# Patient Record
Sex: MALE | Race: WHITE | Employment: OTHER | URBAN - METROPOLITAN AREA
[De-identification: names, ages, dates, MRNs, and addresses within clinical notes are randomized per-mention and may not be internally consistent; named-entity substitution may affect disease eponyms.]

---

## 2017-09-18 ENCOUNTER — TRANSCRIBE ORDERS (OUTPATIENT)
Dept: ADMINISTRATIVE | Facility: HOSPITAL | Age: 67
End: 2017-09-18

## 2017-09-18 ENCOUNTER — ALLSCRIPTS OFFICE VISIT (OUTPATIENT)
Dept: OTHER | Facility: OTHER | Age: 67
End: 2017-09-18

## 2017-09-18 DIAGNOSIS — R31.0 GROSS HEMATURIA: ICD-10-CM

## 2017-09-18 DIAGNOSIS — R31.0 GROSS HEMATURIA: Primary | ICD-10-CM

## 2017-09-18 LAB
BILIRUB UR QL STRIP: NEGATIVE
CLARITY UR: NORMAL
COLOR UR: YELLOW
GLUCOSE (HISTORICAL): NEGATIVE
HGB UR QL STRIP.AUTO: NORMAL
KETONES UR STRIP-MCNC: NEGATIVE MG/DL
LEUKOCYTE ESTERASE UR QL STRIP: NORMAL
NITRITE UR QL STRIP: NEGATIVE
PH UR STRIP.AUTO: 6 [PH]
PROT UR STRIP-MCNC: NEGATIVE MG/DL
SP GR UR STRIP.AUTO: 1.01
UROBILINOGEN UR QL STRIP.AUTO: 0.2

## 2017-09-27 ENCOUNTER — HOSPITAL ENCOUNTER (OUTPATIENT)
Dept: CT IMAGING | Facility: HOSPITAL | Age: 67
Discharge: HOME | End: 2017-09-27
Attending: UROLOGY
Payer: MEDICARE

## 2017-09-27 DIAGNOSIS — R31.0 GROSS HEMATURIA: ICD-10-CM

## 2017-09-27 PROCEDURE — 74178 CT ABD&PLV WO CNTR FLWD CNTR: CPT

## 2017-09-27 RX ADMIN — IOHEXOL 100 ML: 350 INJECTION, SOLUTION INTRAVENOUS at 07:43

## 2017-10-24 ENCOUNTER — ALLSCRIPTS OFFICE VISIT (OUTPATIENT)
Dept: OTHER | Facility: OTHER | Age: 67
End: 2017-10-24

## 2017-10-24 LAB
BILIRUB UR QL STRIP: NEGATIVE
CLARITY UR: NORMAL
COLOR UR: YELLOW
GLUCOSE (HISTORICAL): NEGATIVE
HGB UR QL STRIP.AUTO: NORMAL
KETONES UR STRIP-MCNC: NEGATIVE MG/DL
LEUKOCYTE ESTERASE UR QL STRIP: NORMAL
NITRITE UR QL STRIP: NEGATIVE
PH UR STRIP.AUTO: 6.5 [PH]
PROT UR STRIP-MCNC: NEGATIVE MG/DL
SP GR UR STRIP.AUTO: 1.02
UROBILINOGEN UR QL STRIP.AUTO: 0.2

## 2017-11-14 ENCOUNTER — ANESTHESIA EVENT (OUTPATIENT)
Dept: PERIOP | Facility: HOSPITAL | Age: 67
End: 2017-11-14
Payer: MEDICARE

## 2017-11-14 RX ORDER — SODIUM CHLORIDE 9 MG/ML
125 INJECTION, SOLUTION INTRAVENOUS CONTINUOUS
Status: CANCELLED | OUTPATIENT
Start: 2017-11-14

## 2017-11-15 ENCOUNTER — HOSPITAL ENCOUNTER (OUTPATIENT)
Dept: NON INVASIVE DIAGNOSTICS | Facility: HOSPITAL | Age: 67
Discharge: HOME | End: 2017-11-15
Attending: UROLOGY
Payer: MEDICARE

## 2017-11-15 ENCOUNTER — APPOINTMENT (OUTPATIENT)
Dept: PREADMISSION TESTING | Facility: HOSPITAL | Age: 67
End: 2017-11-15
Payer: MEDICARE

## 2017-11-15 ENCOUNTER — APPOINTMENT (OUTPATIENT)
Dept: LAB | Facility: HOSPITAL | Age: 67
End: 2017-11-15
Attending: UROLOGY
Payer: MEDICARE

## 2017-11-15 ENCOUNTER — HOSPITAL ENCOUNTER (OUTPATIENT)
Dept: RADIOLOGY | Facility: HOSPITAL | Age: 67
Discharge: HOME | End: 2017-11-15
Attending: UROLOGY
Payer: MEDICARE

## 2017-11-15 ENCOUNTER — TRANSCRIBE ORDERS (OUTPATIENT)
Dept: ADMINISTRATIVE | Facility: HOSPITAL | Age: 67
End: 2017-11-15

## 2017-11-15 VITALS
TEMPERATURE: 96.4 F | HEIGHT: 70 IN | BODY MASS INDEX: 25.97 KG/M2 | RESPIRATION RATE: 16 BRPM | DIASTOLIC BLOOD PRESSURE: 70 MMHG | HEART RATE: 74 BPM | WEIGHT: 181.4 LBS | SYSTOLIC BLOOD PRESSURE: 114 MMHG

## 2017-11-15 DIAGNOSIS — Z01.818 PREOP EXAMINATION: ICD-10-CM

## 2017-11-15 DIAGNOSIS — Z01.818 PREOP EXAMINATION: Primary | ICD-10-CM

## 2017-11-15 DIAGNOSIS — D49.4 BLADDER NEOPLASM: ICD-10-CM

## 2017-11-15 LAB
ATRIAL RATE: 65 BPM
BASOPHILS # BLD AUTO: 0.02 THOUSANDS/ΜL (ref 0–0.1)
BASOPHILS NFR BLD AUTO: 0 % (ref 0–1)
EOSINOPHIL # BLD AUTO: 0.06 THOUSAND/ΜL (ref 0–0.61)
EOSINOPHIL NFR BLD AUTO: 1 % (ref 0–6)
ERYTHROCYTE [DISTWIDTH] IN BLOOD BY AUTOMATED COUNT: 13.2 % (ref 11.6–15.1)
HCT VFR BLD AUTO: 39.4 % (ref 36.5–49.3)
HGB BLD-MCNC: 13.1 G/DL (ref 12–17)
LYMPHOCYTES # BLD AUTO: 0.89 THOUSANDS/ΜL (ref 0.6–4.47)
LYMPHOCYTES NFR BLD AUTO: 16 % (ref 14–44)
MCH RBC QN AUTO: 30.2 PG (ref 26.8–34.3)
MCHC RBC AUTO-ENTMCNC: 33.2 G/DL (ref 31.4–37.4)
MCV RBC AUTO: 91 FL (ref 82–98)
MONOCYTES # BLD AUTO: 0.82 THOUSAND/ΜL (ref 0.17–1.22)
MONOCYTES NFR BLD AUTO: 15 % (ref 4–12)
NEUTROPHILS # BLD AUTO: 3.79 THOUSANDS/ΜL (ref 1.85–7.62)
NEUTS SEG NFR BLD AUTO: 68 % (ref 43–75)
NRBC BLD AUTO-RTO: 0 /100 WBCS
P AXIS: 69 DEGREES
PLATELET # BLD AUTO: 176 THOUSANDS/UL (ref 149–390)
PMV BLD AUTO: 10.2 FL (ref 8.9–12.7)
PR INTERVAL: 174 MS
QRS AXIS: 50 DEGREES
QRSD INTERVAL: 122 MS
QT INTERVAL: 414 MS
QTC INTERVAL: 430 MS
RBC # BLD AUTO: 4.34 MILLION/UL (ref 3.88–5.62)
T WAVE AXIS: 27 DEGREES
VENTRICULAR RATE: 65 BPM
WBC # BLD AUTO: 5.58 THOUSAND/UL (ref 4.31–10.16)

## 2017-11-15 PROCEDURE — 87077 CULTURE AEROBIC IDENTIFY: CPT | Performed by: UROLOGY

## 2017-11-15 PROCEDURE — 36415 COLL VENOUS BLD VENIPUNCTURE: CPT

## 2017-11-15 PROCEDURE — 87186 SC STD MICRODIL/AGAR DIL: CPT | Performed by: UROLOGY

## 2017-11-15 PROCEDURE — 87147 CULTURE TYPE IMMUNOLOGIC: CPT | Performed by: UROLOGY

## 2017-11-15 PROCEDURE — 87086 URINE CULTURE/COLONY COUNT: CPT | Performed by: UROLOGY

## 2017-11-15 PROCEDURE — 71020 HB CHEST X-RAY 2VW FRONTAL&LATL: CPT

## 2017-11-15 PROCEDURE — 85025 COMPLETE CBC W/AUTO DIFF WBC: CPT

## 2017-11-15 PROCEDURE — 93005 ELECTROCARDIOGRAM TRACING: CPT

## 2017-11-15 RX ORDER — AMOXICILLIN 500 MG
CAPSULE ORAL DAILY
COMMUNITY
End: 2018-04-11 | Stop reason: DRUGHIGH

## 2017-11-15 RX ORDER — LISINOPRIL 10 MG/1
10 TABLET ORAL EVERY EVENING
COMMUNITY
End: 2022-03-25

## 2017-11-15 RX ORDER — PERPHENAZINE 16 MG
600 TABLET ORAL DAILY
COMMUNITY

## 2017-11-15 RX ORDER — TAMSULOSIN HYDROCHLORIDE 0.4 MG/1
0.4 CAPSULE ORAL
COMMUNITY
End: 2018-03-20 | Stop reason: ALTCHOICE

## 2017-11-15 RX ORDER — KRILL/OM-3/DHA/EPA/PHOSPHO/AST 500MG-86MG
500 CAPSULE ORAL DAILY
COMMUNITY
End: 2022-03-25

## 2017-11-15 RX ORDER — BISOPROLOL FUMARATE AND HYDROCHLOROTHIAZIDE 2.5; 6.25 MG/1; MG/1
1 TABLET ORAL DAILY
COMMUNITY

## 2017-11-15 RX ORDER — SILDENAFIL 50 MG/1
50 TABLET, FILM COATED ORAL DAILY PRN
COMMUNITY
End: 2018-04-11 | Stop reason: ALTCHOICE

## 2017-11-15 RX ORDER — THIAMINE HCL 100 MG
500 TABLET ORAL DAILY
COMMUNITY
End: 2022-03-25

## 2017-11-15 NOTE — PRE-PROCEDURE INSTRUCTIONS
Pre-Surgery Instructions:   Medication Instructions    Alpha-Lipoic Acid 600 MG CAPS Patient was instructed by Physician and understands   bisoprolol-hydrochlorothiazide (ZIAC) 2 5-6 25 MG per tablet Patient was instructed by Physician and understands   CINNAMON PO Patient was instructed by Physician and understands   Krill Oil 500 MG CAPS Patient was instructed by Physician and understands   lisinopril (ZESTRIL) 10 mg tablet Patient was instructed by Physician and understands   Magnesium 250 MG TABS Patient was instructed by Physician and understands   Omega-3 Fatty Acids (FISH OIL) 1200 MG CAPS Patient was instructed by Physician and understands   Saw Palmetto, Serenoa repens, (SAW PALMETTO BERRIES PO) Patient was instructed by Physician and understands   sildenafil (VIAGRA) 50 MG tablet Patient was instructed by Physician and understands   tamsulosin (FLOMAX) 0 4 mg Patient was instructed by Physician and understands   Turmeric Curcumin 500 MG CAPS Patient was instructed by Physician and understands  Pt given/reviewed [facility] preop instructions and chlorhexadine soap   Pt to hold asa/NSAIDS/vitamins/herbal supplements one week before surgery

## 2017-11-15 NOTE — ANESTHESIA PREPROCEDURE EVALUATION
Review of Systems/Medical History  Patient summary reviewed  Chart reviewed  No history of anesthetic complications     Cardiovascular  Hyperlipidemia, Hypertension , No CAD, ,    Pulmonary       GI/Hepatic  Negative GI/hepatic ROS          Negative  ROS Genitourinary malignancy Bladder cancer, Prostatic disorder, benign prostatic hyperplasia       Endo/Other  Negative endo/other ROS      GYN       Hematology  Negative hematology ROS      Musculoskeletal  Negative musculoskeletal ROS        Neurology  Negative neurology ROS      Psychology           Physical Exam    Airway    Mallampati score: II  TM Distance: >3 FB  Neck ROM: full     Dental   Comment: Missing one tooth,   Some crowns,     Cardiovascular  Rhythm: regular, Rate: normal, Cardiovascular exam normal    Pulmonary  Pulmonary exam normal Breath sounds clear to auscultation,     Other Findings        Anesthesia Plan  ASA Score- 2       Anesthesia Type- general with ASA Monitors  Additional Monitors:   Airway Plan:           Induction- intravenous  Informed Consent- Anesthetic plan and risks discussed with patient

## 2017-11-17 LAB — BACTERIA UR CULT: ABNORMAL

## 2017-11-29 ENCOUNTER — ANESTHESIA (OUTPATIENT)
Dept: PERIOP | Facility: HOSPITAL | Age: 67
End: 2017-11-29
Payer: MEDICARE

## 2017-11-29 ENCOUNTER — HOSPITAL ENCOUNTER (OUTPATIENT)
Facility: HOSPITAL | Age: 67
Setting detail: OUTPATIENT SURGERY
Discharge: HOME | End: 2017-11-29
Attending: UROLOGY | Admitting: UROLOGY
Payer: MEDICARE

## 2017-11-29 VITALS
RESPIRATION RATE: 16 BRPM | OXYGEN SATURATION: 99 % | BODY MASS INDEX: 25.97 KG/M2 | HEART RATE: 69 BPM | DIASTOLIC BLOOD PRESSURE: 80 MMHG | WEIGHT: 181.4 LBS | HEIGHT: 70 IN | SYSTOLIC BLOOD PRESSURE: 116 MMHG | TEMPERATURE: 98.3 F

## 2017-11-29 DIAGNOSIS — D49.4 NEOPLASM OF BLADDER: ICD-10-CM

## 2017-11-29 PROCEDURE — 88307 TISSUE EXAM BY PATHOLOGIST: CPT | Performed by: UROLOGY

## 2017-11-29 PROCEDURE — 88342 IMHCHEM/IMCYTCHM 1ST ANTB: CPT | Performed by: UROLOGY

## 2017-11-29 RX ORDER — SODIUM CHLORIDE 9 MG/ML
125 INJECTION, SOLUTION INTRAVENOUS CONTINUOUS
Status: DISCONTINUED | OUTPATIENT
Start: 2017-11-29 | End: 2017-11-29 | Stop reason: HOSPADM

## 2017-11-29 RX ORDER — HYDROCODONE BITARTRATE AND ACETAMINOPHEN 5; 325 MG/1; MG/1
1 TABLET ORAL EVERY 4 HOURS PRN
Qty: 20 TABLET | Refills: 0 | Status: SHIPPED | OUTPATIENT
Start: 2017-11-29 | End: 2018-03-20 | Stop reason: ALTCHOICE

## 2017-11-29 RX ORDER — SULFAMETHOXAZOLE AND TRIMETHOPRIM 800; 160 MG/1; MG/1
1 TABLET ORAL EVERY 12 HOURS SCHEDULED
Qty: 6 TABLET | Refills: 0 | Status: SHIPPED | OUTPATIENT
Start: 2017-11-29 | End: 2017-12-02

## 2017-11-29 RX ORDER — PHENAZOPYRIDINE HYDROCHLORIDE 200 MG/1
200 TABLET, FILM COATED ORAL 3 TIMES DAILY PRN
Qty: 10 TABLET | Refills: 0 | Status: SHIPPED | OUTPATIENT
Start: 2017-11-29 | End: 2018-04-11 | Stop reason: ALTCHOICE

## 2017-11-29 RX ORDER — FENTANYL CITRATE 50 UG/ML
INJECTION, SOLUTION INTRAMUSCULAR; INTRAVENOUS AS NEEDED
Status: DISCONTINUED | OUTPATIENT
Start: 2017-11-29 | End: 2017-11-29 | Stop reason: SURG

## 2017-11-29 RX ORDER — DEXAMETHASONE SODIUM PHOSPHATE 4 MG/ML
INJECTION, SOLUTION INTRA-ARTICULAR; INTRALESIONAL; INTRAMUSCULAR; INTRAVENOUS; SOFT TISSUE AS NEEDED
Status: DISCONTINUED | OUTPATIENT
Start: 2017-11-29 | End: 2017-11-29 | Stop reason: SURG

## 2017-11-29 RX ORDER — ONDANSETRON 2 MG/ML
INJECTION INTRAMUSCULAR; INTRAVENOUS AS NEEDED
Status: DISCONTINUED | OUTPATIENT
Start: 2017-11-29 | End: 2017-11-29 | Stop reason: SURG

## 2017-11-29 RX ORDER — MEPERIDINE HYDROCHLORIDE 50 MG/ML
12.5 INJECTION INTRAMUSCULAR; INTRAVENOUS; SUBCUTANEOUS ONCE AS NEEDED
Status: DISCONTINUED | OUTPATIENT
Start: 2017-11-29 | End: 2017-11-29 | Stop reason: HOSPADM

## 2017-11-29 RX ORDER — EPHEDRINE SULFATE 50 MG/ML
INJECTION, SOLUTION INTRAVENOUS AS NEEDED
Status: DISCONTINUED | OUTPATIENT
Start: 2017-11-29 | End: 2017-11-29 | Stop reason: SURG

## 2017-11-29 RX ORDER — FENTANYL CITRATE/PF 50 MCG/ML
50 SYRINGE (ML) INJECTION
Status: DISCONTINUED | OUTPATIENT
Start: 2017-11-29 | End: 2017-11-29 | Stop reason: HOSPADM

## 2017-11-29 RX ORDER — PROPOFOL 10 MG/ML
INJECTION, EMULSION INTRAVENOUS AS NEEDED
Status: DISCONTINUED | OUTPATIENT
Start: 2017-11-29 | End: 2017-11-29 | Stop reason: SURG

## 2017-11-29 RX ORDER — MIDAZOLAM HYDROCHLORIDE 1 MG/ML
INJECTION INTRAMUSCULAR; INTRAVENOUS AS NEEDED
Status: DISCONTINUED | OUTPATIENT
Start: 2017-11-29 | End: 2017-11-29 | Stop reason: SURG

## 2017-11-29 RX ORDER — ONDANSETRON 2 MG/ML
4 INJECTION INTRAMUSCULAR; INTRAVENOUS ONCE AS NEEDED
Status: DISCONTINUED | OUTPATIENT
Start: 2017-11-29 | End: 2017-11-29 | Stop reason: HOSPADM

## 2017-11-29 RX ORDER — HYDROCODONE BITARTRATE AND ACETAMINOPHEN 5; 325 MG/1; MG/1
1 TABLET ORAL EVERY 6 HOURS PRN
Status: DISCONTINUED | OUTPATIENT
Start: 2017-11-29 | End: 2017-11-29 | Stop reason: HOSPADM

## 2017-11-29 RX ORDER — PHENAZOPYRIDINE HYDROCHLORIDE 200 MG/1
200 TABLET, FILM COATED ORAL 3 TIMES DAILY PRN
Status: DISCONTINUED | OUTPATIENT
Start: 2017-11-29 | End: 2017-11-29 | Stop reason: HOSPADM

## 2017-11-29 RX ADMIN — FENTANYL CITRATE 25 MCG: 50 INJECTION INTRAMUSCULAR; INTRAVENOUS at 08:41

## 2017-11-29 RX ADMIN — EPHEDRINE SULFATE 5 MG: 50 INJECTION, SOLUTION INTRAMUSCULAR; INTRAVENOUS; SUBCUTANEOUS at 08:37

## 2017-11-29 RX ADMIN — MIDAZOLAM HYDROCHLORIDE 2 MG: 1 INJECTION, SOLUTION INTRAMUSCULAR; INTRAVENOUS at 08:24

## 2017-11-29 RX ADMIN — EPHEDRINE SULFATE 5 MG: 50 INJECTION, SOLUTION INTRAMUSCULAR; INTRAVENOUS; SUBCUTANEOUS at 08:48

## 2017-11-29 RX ADMIN — EPHEDRINE SULFATE 10 MG: 50 INJECTION, SOLUTION INTRAMUSCULAR; INTRAVENOUS; SUBCUTANEOUS at 09:00

## 2017-11-29 RX ADMIN — CEFAZOLIN SODIUM 2000 MG: 2 SOLUTION INTRAVENOUS at 08:32

## 2017-11-29 RX ADMIN — ONDANSETRON HYDROCHLORIDE 4 MG: 2 INJECTION, SOLUTION INTRAVENOUS at 08:24

## 2017-11-29 RX ADMIN — PROPOFOL 200 MG: 10 INJECTION, EMULSION INTRAVENOUS at 08:28

## 2017-11-29 RX ADMIN — DEXAMETHASONE SODIUM PHOSPHATE 4 MG: 4 INJECTION, SOLUTION INTRAMUSCULAR; INTRAVENOUS at 08:32

## 2017-11-29 RX ADMIN — SODIUM CHLORIDE: 0.9 INJECTION, SOLUTION INTRAVENOUS at 08:52

## 2017-11-29 RX ADMIN — FENTANYL CITRATE 25 MCG: 50 INJECTION INTRAMUSCULAR; INTRAVENOUS at 08:35

## 2017-11-29 RX ADMIN — SODIUM CHLORIDE 125 ML/HR: 0.9 INJECTION, SOLUTION INTRAVENOUS at 07:52

## 2017-11-29 RX ADMIN — FENTANYL CITRATE 25 MCG: 50 INJECTION INTRAMUSCULAR; INTRAVENOUS at 08:31

## 2017-11-29 RX ADMIN — WATER 40 MG: 1 INJECTION INTRAMUSCULAR; INTRAVENOUS; SUBCUTANEOUS at 08:59

## 2017-11-29 RX ADMIN — LIDOCAINE HYDROCHLORIDE 80 MG: 20 INJECTION, SOLUTION INTRAVENOUS at 08:28

## 2017-11-29 RX ADMIN — FENTANYL CITRATE 25 MCG: 50 INJECTION INTRAMUSCULAR; INTRAVENOUS at 08:45

## 2017-11-29 NOTE — OP NOTE
OPERATIVE REPORT  PATIENT NAME: Alfredo Gonzalez    :  1950  MRN: [id number]  Pt Location: [facility] OR ROOM 07    SURGERY DATE: 2017    Surgeon(s) and Role:     * Yomaira Stock MD - Primary    Preop Diagnosis:  Neoplasm of bladder [D49 4]    Post-Op Diagnosis Codes: * Neoplasm of bladder [D49 4]    Procedure(s) (LRB):  CYSTOSCOPY (N/A)  TRANSURETHRAL RESECTION OF BLADDER TUMOR (TURBT) (N/A)  INSTILLATION MYTOMYCIN (N/A)    Specimen(s):  ID Type Source Tests Collected by Time Destination   1 : BLADDER TUMOR  LEFT POSTERIOR WALL Tissue Urinary Bladder TISSUE EXAM Yomaira Stock MD 2017 [id number]        Estimated Blood Loss:   Minimal    Drains:  Church catheter       Anesthesia Type:   General    Operative Indications:  Neoplasm of bladder [D49 4]      Operative Findings:  Papillary tumor covering the left posterior lateral wall, left ureteral orifice, and left bladder neck  Small tumor left lateral wall  All resected completely and left ureteral orifice confirmed uninvolved with tumor and patent at and procedure  Superficial orifice was resected  Complications:   None    Procedure and Technique:  Patient is a 61-year-old man was found to have 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 and left lateral trigone in my office  CT scan is negative  I offered cystoscopy, trans urethral resection of bladder tumor and mitomycin C instillation due to extensive carpeting of tumor  These appear to be papillary lesions that individually are small but extensively coat the area of bladder involved  The risks of bleeding infection damage to the bladder with need for open surgery were explained and he gives informed consent  Patient was brought to the operating room and identified properly  LMA was induced the patient was prepped and draped in the dorsal lithotomy position in the usual fashion  A time-out was performed    The 32 [place] resectoscope sheath with obturators introduced without difficulty into the bladder  The resectoscope was placed in and I surveyed the bladder  There was papillary tumor at maximal height of 5-7 mm but carpeting his left lateral trigone and covering his left ureteral orifice and extending to the left lateral wall and inferior, lateral and anterior bladder neck  It also involve the left posterior wall  This was all resected with the resectoscope loop extensively, with deep muscular bites  The superficial left ureteral orifice was resected at the end of the case could see the left ureter that was uninvolved with tumor and was unobstructed  All tumors were resected the bladder was carefully inspected to make sure there is nothing left behind  Fulguration was carried out for hemostasis  There was very little bleeding  A 16 [place] catheter was used to drain the bladder and then 40 mg of mitomycin-C was instilled into the bladder and the catheter was clamped  The patient was sent to the recovery room with the catheter in and mitomycin in the bladder     I was present for the entire procedure and A qualified resident physician was not available    Patient Disposition:  PACU  and extubated and stable    SIGNATURE: Magda Velasquez MD  DATE: November 29, 2017  TIME: 9:07 AM

## 2017-11-29 NOTE — DISCHARGE INSTRUCTIONS
Cystoscopy   WHAT YOU NEED TO KNOW:   A cystoscopy is a procedure to look inside of your urethra and bladder using a cystoscope  A cystoscope is a small tube with a light and magnifying camera on the end  The procedure is used to diagnose and treat conditions of the bladder, urethra, and prostate  The procedure is also done to remove stones or blood clots from the urethra or bladder  Your healthcare provider may do other tests, such as ureteroscopy, during a cystoscopy  DISCHARGE INSTRUCTIONS:   Call 911 if:   · You suddenly have chest pain or trouble breathing  Seek care immediately if:   · Your urine turns from pink to red, or you have clots in your urine  · You cannot urinate and your bladder feels full  · Your pain or burning becomes worse or lasts longer than 2 days  Contact your healthcare provider or urologist if:   · Your urine stays pink for longer than 3 days  · You urinate less than normal, or still feel like you have to urinate after you use the bathroom  · Your skin is itchy, swollen, or has a new rash  · You have a fever and chills  · You have questions or concerns about your condition or care  Medicines: You may  be given any of the following:  · Antibiotics  help treat or prevent a bacterial infection  · Acetaminophen  decreases pain and fever  It is available without a doctor's order  Ask how much to take and how often to take it  Follow directions  Read the labels of all other medicines you are using to see if they also contain acetaminophen, or ask your doctor or pharmacist  Acetaminophen can cause liver damage if not taken correctly  Do not use more than 4 grams (4,000 milligrams) total of acetaminophen in one day  · Take your medicine as directed  Contact your healthcare provider if you think your medicine is not helping or if you have side effects  Tell him or her if you are allergic to any medicine   Keep a list of the medicines, vitamins, and herbs you take  Include the amounts, and when and why you take them  Bring the list or the pill bottles to follow-up visits  Carry your medicine list with you in case of an emergency  Follow up with your healthcare provider as directed: You may need to have another cystoscopy  Write down your questions so you remember to ask them during your visits  Self-care:   · Drink at least 3 to 4 glasses of water daily for 2 days after your procedure  Do not drink acidic juices such as orange juice and lemonade  Drink water to help prevent blood clots from forming  It can also help decrease the amount of acid in your urine  Acid in your urine may increase the burning feeling when you urinate  · Sit in a warm tub of water  Warm water may relieve pain and bladder spasms  · Do not have sex  until your healthcare provider tells you it is okay  Sex may increase your risk for a urinary tract infection  © 2017 [address] [employer] Information is for End User's use only and may not be sold, redistributed or otherwise used for commercial purposes  All illustrations and images included in CareNotes® are the copyrighted property of [employer]  or [employer]ll[employer]  The above information is an  only  It is not intended as medical advice for individual conditions or treatments  Talk to your doctor, nurse or pharmacist before following any medical regimen to see if it is safe and effective for you  Transurethral Resection of Bladder Tumors   WHAT YOU NEED TO KNOW:   Transurethral resection of bladder tumors (TURBT) is surgery to remove one or more tumors from your bladder  DISCHARGE INSTRUCTIONS:   Medicines:   · Medicines  help decrease pain or prevent vomiting  · Take your medicine as directed  Contact your healthcare provider if you think your medicine is not helping or if you have side effects  Tell him or her if you are allergic to any medicine   Keep a list of the medicines, vitamins, and herbs you take  Include the amounts, and when and why you take them  Bring the list or the pill bottles to follow-up visits  Carry your medicine list with you in case of an emergency  Follow up with your healthcare provider as directed:  Write down your questions so you remember to ask them during your visits  Care for your Church catheter:  Keep the bag below your waist  This will prevent urine from flowing back into your bladder and causing an infection or other problems  Also, keep the tube free of kinks so the urine will drain properly  Do not pull on the catheter  This can cause pain and bleeding and may cause the catheter to come out  Empty your urine drainage bag when it is ½ to ? full, or every 8 hours  If you have a smaller leg bag, empty it every 3 to 4 hours  Do the following when you empty your urine drainage bag:  · Hold the urine bag over the toilet or a large container  · Remove the drain spout from its sleeve at the bottom of the urine bag  Do not touch the tip of the drain spout  Open the slide valve on the spout  · Let the urine flow out of the urine bag into the toilet or container  Do not let the drainage tube touch anything  · Clean the end of the drain spout with alcohol when the bag is empty  Ask which cleaning solution is best to use  · Close the slide valve and put the drain spout into its sleeve at the bottom of the urine bag  Write down how much urine was in your bag if you were asked to keep a record  Contact your healthcare provider if:   · You have a fever or chills  · You have new or more blood in your urine  · You have nausea or are vomiting  · You have new or more pain when you urinate  · You are unable to control when you urinate  · You have questions or concerns about your condition or care  Seek care immediately or call 911 if:   · You have heavy bleeding from your urethra      · You start to urinate less often, very little, or not at all     · You have severe pain in your abdomen or pelvis  © 2017 [address] [employer] Information is for End User's use only and may not be sold, redistributed or otherwise used for commercial purposes  All illustrations and images included in CareNotes® are the copyrighted property of [employer]  or [employer] Arellano  The above information is an  only  It is not intended as medical advice for individual conditions or treatments  Talk to your doctor, nurse or pharmacist before following any medical regimen to see if it is safe and effective for you

## 2017-12-18 ENCOUNTER — ALLSCRIPTS OFFICE VISIT (OUTPATIENT)
Dept: OTHER | Facility: OTHER | Age: 67
End: 2017-12-18

## 2017-12-18 LAB
BILIRUB UR QL STRIP: NEGATIVE
CLARITY UR: NORMAL
COLOR UR: YELLOW
GLUCOSE (HISTORICAL): NEGATIVE
HGB UR QL STRIP.AUTO: NORMAL
KETONES UR STRIP-MCNC: NEGATIVE MG/DL
LEUKOCYTE ESTERASE UR QL STRIP: NORMAL
NITRITE UR QL STRIP: NEGATIVE
PH UR STRIP.AUTO: 6 [PH]
PROT UR STRIP-MCNC: NORMAL MG/DL
SP GR UR STRIP.AUTO: 1.02
UROBILINOGEN UR QL STRIP.AUTO: 0.2

## 2018-01-12 DIAGNOSIS — N39.0 URINARY TRACT INFECTION: ICD-10-CM

## 2018-01-13 VITALS
BODY MASS INDEX: 25.91 KG/M2 | HEIGHT: 70 IN | SYSTOLIC BLOOD PRESSURE: 130 MMHG | DIASTOLIC BLOOD PRESSURE: 68 MMHG | WEIGHT: 181 LBS

## 2018-01-17 ENCOUNTER — HOSPITAL ENCOUNTER (OUTPATIENT)
Dept: INFUSION CENTER | Facility: CLINIC | Age: 68
Discharge: HOME | End: 2018-01-17
Payer: MEDICARE

## 2018-01-17 VITALS — DIASTOLIC BLOOD PRESSURE: 62 MMHG | TEMPERATURE: 97.1 F | SYSTOLIC BLOOD PRESSURE: 108 MMHG | HEART RATE: 72 BPM

## 2018-01-17 PROCEDURE — 51720 TREATMENT OF BLADDER LESION: CPT

## 2018-01-17 RX ADMIN — SODIUM CHLORIDE 50 MG: 9 INJECTION, SOLUTION INTRAVENOUS at 11:35

## 2018-01-17 NOTE — PLAN OF CARE
Problem: PAIN - ADULT  Goal: Verbalizes/displays adequate comfort level or baseline comfort level  Interventions:  - Encourage patient to monitor pain and request assistance  - Assess pain using appropriate pain scale  - Administer analgesics based on type and severity of pain and evaluate response  - Implement non-pharmacological measures as appropriate and evaluate response  - Consider cultural and social influences on pain and pain management  - Notify physician/advanced practitioner if interventions unsuccessful or patient reports new pain  Outcome: Progressing      Problem: INFECTION - ADULT  Goal: Absence or prevention of progression during hospitalization  INTERVENTIONS:  - Assess and monitor for signs and symptoms of infection  - Monitor lab/diagnostic results  - Monitor all insertion sites, i e  indwelling lines, tubes, and drains  - Monitor endotracheal (as able) and nasal secretions for changes in amount and color  - [place] appropriate cooling/warming therapies per order  - Administer medications as ordered  - Instruct and encourage patient and family to use good hand hygiene technique  - Identify and instruct in appropriate isolation precautions for identified infection/condition  Outcome: Progressing    Goal: Absence of fever/infection during neutropenic period  INTERVENTIONS:  - Monitor WBC  - Implement neutropenic guidelines  Outcome: Progressing      Problem: Knowledge Deficit  Goal: Patient/family/caregiver demonstrates understanding of disease process, treatment plan, medications, and discharge instructions  Complete learning assessment and assess knowledge base    Interventions:  - Provide teaching at level of understanding  - Provide teaching via preferred learning methods  Outcome: Progressing

## 2018-01-17 NOTE — PROGRESS NOTES
Pt  Denies new symptoms or concerns  Reviewed verbally and  Printed instructions to refer to upon discharge  Pt  And spouse verbalized understanding  12F bowman catheter inserted to empty bladder as ordered  Pt  Tolerated well  BCG 50 mg instilled into bladder as ordered  Pt  Tolerated well  Catheter removed and Pt  Discharged as ordered  Pt  Understands to hold urine x 2 hrs  Pt  Understands to lie down once home and change position q 15 min  To circulate medication throughout bladder  Pt  Understands to call Dr Sean Weir with any concerns  Pt  Will follow instructions given for voiding  Future appointments reviewed  AVS provided

## 2018-01-22 VITALS
HEIGHT: 70 IN | DIASTOLIC BLOOD PRESSURE: 84 MMHG | WEIGHT: 183 LBS | SYSTOLIC BLOOD PRESSURE: 130 MMHG | BODY MASS INDEX: 26.2 KG/M2

## 2018-01-24 ENCOUNTER — HOSPITAL ENCOUNTER (OUTPATIENT)
Dept: INFUSION CENTER | Facility: CLINIC | Age: 68
Discharge: HOME | End: 2018-01-24
Payer: MEDICARE

## 2018-01-24 VITALS
DIASTOLIC BLOOD PRESSURE: 74 MMHG | RESPIRATION RATE: 18 BRPM | SYSTOLIC BLOOD PRESSURE: 112 MMHG | TEMPERATURE: 96.6 F | HEART RATE: 55 BPM

## 2018-01-24 PROCEDURE — 51720 TREATMENT OF BLADDER LESION: CPT

## 2018-01-24 RX ADMIN — SODIUM CHLORIDE 50 MG: 9 INJECTION, SOLUTION INTRAVENOUS at 11:10

## 2018-01-24 NOTE — PROGRESS NOTES
Patient tolerated the insertion of a 12 [place] bowman catheter  BGC was instilled without complications  Patient's catheter was removed and the patient is aware of precautions to take with BCG

## 2018-01-24 NOTE — PLAN OF CARE

## 2018-01-31 ENCOUNTER — HOSPITAL ENCOUNTER (OUTPATIENT)
Dept: INFUSION CENTER | Facility: CLINIC | Age: 68
Discharge: HOME | End: 2018-01-31
Payer: MEDICARE

## 2018-01-31 VITALS
HEART RATE: 56 BPM | RESPIRATION RATE: 18 BRPM | TEMPERATURE: 96.5 F | DIASTOLIC BLOOD PRESSURE: 75 MMHG | SYSTOLIC BLOOD PRESSURE: 114 MMHG

## 2018-01-31 PROCEDURE — 51720 TREATMENT OF BLADDER LESION: CPT

## 2018-01-31 RX ADMIN — SODIUM CHLORIDE 50 MG: 9 INJECTION, SOLUTION INTRAVENOUS at 10:55

## 2018-01-31 NOTE — PLAN OF CARE
Problem: PAIN - ADULT  Goal: Verbalizes/displays adequate comfort level or baseline comfort level  Interventions:  - Encourage patient to monitor pain and request assistance  - Assess pain using appropriate pain scale  - Administer analgesics based on type and severity of pain and evaluate response  - Implement non-pharmacological measures as appropriate and evaluate response  - Consider cultural and social influences on pain and pain management  - Notify physician/advanced practitioner if interventions unsuccessful or patient reports new pain  Outcome: Progressing      Problem: INFECTION - ADULT  Goal: Absence or prevention of progression during hospitalization  INTERVENTIONS:  - Assess and monitor for signs and symptoms of infection  - Monitor lab/diagnostic results  - Monitor all insertion sites, i e  indwelling lines, tubes, and drains  - Monitor endotracheal (as able) and nasal secretions for changes in amount and color  - [place] appropriate cooling/warming therapies per order  - Administer medications as ordered  - Instruct and encourage patient and family to use good hand hygiene technique  - Identify and instruct in appropriate isolation precautions for identified infection/condition  Outcome: Progressing    Goal: Absence of fever/infection during neutropenic period  INTERVENTIONS:  - Monitor WBC  - Implement neutropenic guidelines  Outcome: Progressing      Problem: Knowledge Deficit  Goal: Patient/family/caregiver demonstrates understanding of disease process, treatment plan, medications, and discharge instructions  Complete learning assessment and assess knowledge base    Interventions:  - Provide teaching at level of understanding  - Provide teaching via preferred learning methods  Outcome: Progressing

## 2018-01-31 NOTE — PROGRESS NOTES
14F Catheter inserted into bladder at this time  Clear yellow urine noted  BCG was inserted without complications  Pt  Catheter was removed and the patient verbalized process and precautions to take post BCG  Pt  Discharged to home  Future appointments reviewed   Declined AVS

## 2018-02-07 ENCOUNTER — TELEPHONE (OUTPATIENT)
Dept: UROLOGY | Facility: MEDICAL CENTER | Age: 68
End: 2018-02-07

## 2018-02-07 ENCOUNTER — HOSPITAL ENCOUNTER (OUTPATIENT)
Dept: INFUSION CENTER | Facility: CLINIC | Age: 68
Discharge: HOME | End: 2018-02-07

## 2018-02-07 NOTE — TELEPHONE ENCOUNTER
Pt  states he was feverish, chills, chest congestion last night  Temp this am 99 8  Still has some congestion  Will cancel today's BCG Instillation and add on missed treatment and end of series

## 2018-02-14 ENCOUNTER — HOSPITAL ENCOUNTER (OUTPATIENT)
Dept: INFUSION CENTER | Facility: CLINIC | Age: 68
Discharge: HOME | End: 2018-02-14

## 2018-02-14 VITALS — TEMPERATURE: 96.6 F

## 2018-02-14 NOTE — PROGRESS NOTES
Pt arrived to [facility] [place] center for bladder chemotherapy  States that he had bronchitis and sinusitus last week  Has been on Augmentin since Friday and has 5 more days of the antibiotic therapy  Call made to physician office to make aware  Per Griselda Gresham RN, pt cannot have BCG treatment while on antibiotics  Confirmed with Griselda Gresham RN that if pt is done with antibiotics and has no additional symptoms next week, he will be ok to resume treatment  Pt and wife made aware, and verbalized understanding  Appointment rescheduled to end of cycle  Pt and wife aware to return next week for next appointment

## 2018-02-21 ENCOUNTER — HOSPITAL ENCOUNTER (OUTPATIENT)
Dept: INFUSION CENTER | Facility: CLINIC | Age: 68
Discharge: HOME | End: 2018-02-21
Payer: MEDICARE

## 2018-02-21 VITALS — HEART RATE: 65 BPM | SYSTOLIC BLOOD PRESSURE: 109 MMHG | TEMPERATURE: 97.4 F | DIASTOLIC BLOOD PRESSURE: 69 MMHG

## 2018-02-21 PROCEDURE — 51720 TREATMENT OF BLADDER LESION: CPT

## 2018-02-21 RX ADMIN — SODIUM CHLORIDE 50 MG: 9 INJECTION, SOLUTION INTRAVENOUS at 11:10

## 2018-02-21 NOTE — PROGRESS NOTES
BCG instilled intravesically as ordered without difficulty  Church removed  Pt is aware to hold urine for 2 hours and to turn and repo at home  Pt aware of precautions with BCG  AVS provided and pt is aware of future appts

## 2018-02-21 NOTE — PLAN OF CARE

## 2018-02-28 ENCOUNTER — HOSPITAL ENCOUNTER (OUTPATIENT)
Dept: INFUSION CENTER | Facility: CLINIC | Age: 68
Discharge: HOME | End: 2018-02-28
Payer: MEDICARE

## 2018-02-28 VITALS
TEMPERATURE: 97.5 F | RESPIRATION RATE: 18 BRPM | SYSTOLIC BLOOD PRESSURE: 98 MMHG | HEART RATE: 57 BPM | DIASTOLIC BLOOD PRESSURE: 66 MMHG

## 2018-02-28 PROCEDURE — 51720 TREATMENT OF BLADDER LESION: CPT

## 2018-02-28 RX ADMIN — SODIUM CHLORIDE 50 MG: 9 INJECTION, SOLUTION INTRAVENOUS at 10:24

## 2018-02-28 NOTE — PROGRESS NOTES
Pt here today for bladder chemotherapy  Pt requesting 12 [place] catheter  12 [place] catheter inserted without difficulty and is draining yellow urine  Pt denies complaints at this time

## 2018-02-28 NOTE — PROGRESS NOTES
BCG instilled intravesically as ordered without incident  Church catheter removed  Reviewed instructions for at home with pt and wife and pt verbalizes understanding  Pt declined AVS but is aware of appt next week

## 2018-02-28 NOTE — PLAN OF CARE

## 2018-03-06 ENCOUNTER — HOSPITAL ENCOUNTER (OUTPATIENT)
Dept: INFUSION CENTER | Facility: CLINIC | Age: 68
Discharge: HOME | End: 2018-03-06
Payer: MEDICARE

## 2018-03-06 VITALS
DIASTOLIC BLOOD PRESSURE: 69 MMHG | HEART RATE: 61 BPM | SYSTOLIC BLOOD PRESSURE: 116 MMHG | TEMPERATURE: 97.2 F | RESPIRATION RATE: 18 BRPM

## 2018-03-06 PROCEDURE — 51720 TREATMENT OF BLADDER LESION: CPT

## 2018-03-06 RX ADMIN — SODIUM CHLORIDE 50 MG: 9 INJECTION, SOLUTION INTRAVENOUS at 11:27

## 2018-03-06 NOTE — PROGRESS NOTES
Patient denies complications from his previous infusions  He tolerated the insertion of a 12 Fr bowman catheter  BCG was instilled and the catheter was removed  Patient left ambulatory and is aware to hold his urine for 2 hours

## 2018-03-06 NOTE — PLAN OF CARE

## 2018-03-20 ENCOUNTER — PROCEDURE VISIT (OUTPATIENT)
Dept: UROLOGY | Facility: MEDICAL CENTER | Age: 68
End: 2018-03-20
Payer: MEDICARE

## 2018-03-20 VITALS
WEIGHT: 183 LBS | BODY MASS INDEX: 25.62 KG/M2 | HEIGHT: 71 IN | DIASTOLIC BLOOD PRESSURE: 70 MMHG | SYSTOLIC BLOOD PRESSURE: 120 MMHG

## 2018-03-20 DIAGNOSIS — D09.0 BLADDER CA IN SITU: Primary | ICD-10-CM

## 2018-03-20 DIAGNOSIS — C67.4 MALIGNANT NEOPLASM OF POSTERIOR WALL OF URINARY BLADDER (HCC): ICD-10-CM

## 2018-03-20 LAB
SL AMB  POCT GLUCOSE, UA: ABNORMAL
SL AMB LEUKOCYTE ESTERASE,UA: ABNORMAL
SL AMB POCT BILIRUBIN,UA: ABNORMAL
SL AMB POCT BLOOD,UA: ABNORMAL
SL AMB POCT CLARITY,UA: CLEAR
SL AMB POCT COLOR,UA: YELLOW
SL AMB POCT KETONES,UA: ABNORMAL
SL AMB POCT NITRITE,UA: ABNORMAL
SL AMB POCT PH,UA: 5
SL AMB POCT SPECIFIC GRAVITY,UA: 1.02
SL AMB POCT URINE PROTEIN: ABNORMAL
SL AMB POCT UROBILINOGEN: 0.2

## 2018-03-20 PROCEDURE — 99215 OFFICE O/P EST HI 40 MIN: CPT | Performed by: UROLOGY

## 2018-03-20 PROCEDURE — 87086 URINE CULTURE/COLONY COUNT: CPT | Performed by: UROLOGY

## 2018-03-20 PROCEDURE — 52000 CYSTOURETHROSCOPY: CPT | Performed by: UROLOGY

## 2018-03-20 PROCEDURE — 81003 URINALYSIS AUTO W/O SCOPE: CPT | Performed by: UROLOGY

## 2018-03-20 RX ORDER — OFLOXACIN 3 MG/ML
SOLUTION/ DROPS OPHTHALMIC
COMMUNITY
Start: 2017-04-14 | End: 2018-03-20 | Stop reason: ALTCHOICE

## 2018-03-20 RX ORDER — BENZONATATE 100 MG/1
CAPSULE ORAL
COMMUNITY
Start: 2018-02-09 | End: 2018-03-20 | Stop reason: ALTCHOICE

## 2018-03-20 NOTE — H&P
[address] [facility]  [facility]  [address] [facility]  [place], [address] [place] [address]  [phone]  www  [facility]  org      NAME: Valdemar Childs  AGE: 78 y o  SEX: male  : 1950   MRN: [id number]    DATE: 3/20/2018  TIME: 9:11 AM    Assessment and Plan:  Bladder cancer recurrence left posterior lateral wall  Has some erythema over previous resection site  Plan:  Cystoscopy, bilateral retrograde pyelography, TURBT and bladder biopsies and mitomycin C instillation in the operating room  The risks of bleeding infection and damage to the urinary tract were well known to the patient he gives informed consent  Elevated PSA:  Will recheck after everything settles down  Chief Complaint     Chief Complaint   Patient presents with    Bladder Cancer       History of Present Illness   Bladder cancer:  Status post TURBT 2017 of papillary tumor covering the left posterior lateral wall, left ureteral orifice and left bladder neck  There was also small tumor left lateral wall  All resected completely and left ureteral orifice was confirmed uninvolved with tumor and patent at the end of procedure  Pathology showed low-grade TCC without invasion  He then had mitomycin instilled, and then underwent 6 weeks of BCG installation  He is now here for his first surveillance cystoscopy  Elevated PSA:  PSA was 2 9 in May of 2017, then jumped up to 12  2017  He was having symptoms of hesitancy and may have had prostatitis at that time  We will eventually recheck this when things are calmed down from the BCG in his recent instrumentation      Cystoscopy Procedure Note        Pre-operative Diagnosis:  Bladder cancer    Post-operative Diagnosis:  Bladder cancer, with recurrence left posterior wall, erythematous lesions over left lateral trigone    Procedure: Flexible cystoscopy    Surgeon: Yumiko Ramos MD    Anesthesia: 1% Xylocaine per urethra    EBL: Minimal    Complications: none    Procedure Details   The risks, benefits, complications, treatment options, and expected outcomes were discussed with the patient  The patient concurred with the proposed plan, giving informed consent  Cystoscopy was performed today under local anesthesia, using sterile technique  The patient was placed in the supine position, prepped with Betadine, and draped in the usual sterile fashion  The flexible cystocope was used to inspect both the urethra and bladder    Findings:  Urethra:  Normal without stricture  Moderate BPH with obstruction  Bladder:  Smooth, not trabeculated and there is a tiny recurrence on the left posterior  lateral wall-there is some stellate scarring with erythema over the lateral trigone on the left, not involving the orifice  The orifices were orthotopic and intact  Specimens:  None                 Complications:  None           Disposition: To home            Condition:  Stable              The following portions of the patient's history were reviewed and updated as appropriate: allergies, current medications, past family history, past medical history, past social history, past surgical history and problem list     Review of Systems   Review of Systems    Active Problem List   There is no problem list on file for this patient  Objective   /70   Ht 5' 10 5" (1 791 m)   Wt 83 kg (183 lb)   BMI 25 89 kg/m²      Physical Exam   Constitutional: He is oriented to person, place, and time  He appears well-developed and well-nourished  HENT:   Head: Normocephalic and atraumatic  Eyes: EOM are normal    Neck: Normal range of motion  Cardiovascular: Normal rate, regular rhythm and normal heart sounds  Pulmonary/Chest: Effort normal and breath sounds normal  No respiratory distress  He has no wheezes  He has no rales  He exhibits no tenderness  Abdominal: Soft  He exhibits no distension and no mass  There is no tenderness   There is no rebound and no guarding  Genitourinary: Penis normal    Musculoskeletal: Normal range of motion  He exhibits no edema, tenderness or deformity  Neurological: He is alert and oriented to person, place, and time  Skin: Skin is warm and dry  Psychiatric: He has a normal mood and affect   His behavior is normal  Judgment and thought content normal            Current Medications     Current Outpatient Prescriptions:     Alpha-Lipoic Acid 600 MG CAPS, Take by mouth daily, Disp: , Rfl:     bisoprolol-hydrochlorothiazide (ZIAC) 2 5-6 25 MG per tablet, Take 1 tablet by mouth daily, Disp: , Rfl:     CINNAMON PO, Take 250 mg by mouth daily, Disp: , Rfl:     Krill Oil 500 MG CAPS, Take by mouth daily, Disp: , Rfl:     lisinopril (ZESTRIL) 10 mg tablet, Take 10 mg by mouth every evening, Disp: , Rfl:     Magnesium 250 MG TABS, Take by mouth daily, Disp: , Rfl:     Omega-3 Fatty Acids (FISH OIL) 1200 MG CAPS, Take by mouth daily, Disp: , Rfl:     Saw Palmetto, Serenoa repens, (SAW PALMETTO BERRIES PO), Take by mouth daily ResQ Prostate, Disp: , Rfl:     sildenafil (VIAGRA) 50 MG tablet, Take 50 mg by mouth daily as needed for erectile dysfunction, Disp: , Rfl:     Turmeric Curcumin 500 MG CAPS, Take by mouth daily, Disp: , Rfl:     benzonatate (TESSALON PERLES) 100 mg capsule, , Disp: , Rfl:     HYDROcodone-acetaminophen (NORCO) 5-325 mg per tablet, Take 1 tablet by mouth every 4 (four) hours as needed for pain for up to 20 doses Max Daily Amount: 6 tablets, Disp: 20 tablet, Rfl: 0    ofloxacin (OCUFLOX) 0 3 % ophthalmic solution, Apply to eye, Disp: , Rfl:     phenazopyridine (PYRIDIUM) 200 mg tablet, Take 1 tablet by mouth 3 (three) times a day as needed for bladder spasms, Disp: 10 tablet, Rfl: 0    tamsulosin (FLOMAX) 0 4 mg, Take 0 4 mg by mouth daily with dinner, Disp: , Rfl:     VENTOLIN  (90 Base) MCG/ACT inhaler, , Disp: , Rfl:         Kathy Bowles MD

## 2018-03-20 NOTE — PROGRESS NOTES
[address] [facility]  [facility]  [address] [facility]  [place], [address] [place] [address]  [phone]  www  [facility]  org      NAME: Christian Ames  AGE: 78 y o  SEX: male  : 1950   MRN: [id number]    DATE: 3/20/2018  TIME: 9:11 AM    Assessment and Plan:  Bladder cancer recurrence left posterior lateral wall  Has some erythema over previous resection site  Plan:  Cystoscopy, bilateral retrograde pyelography, TURBT and bladder biopsies and mitomycin C instillation in the operating room  The risks of bleeding infection and damage to the urinary tract were well known to the patient he gives informed consent  Elevated PSA:  Will recheck after everything settles down  Chief Complaint     Chief Complaint   Patient presents with    Bladder Cancer       History of Present Illness   Bladder cancer:  Status post TURBT 2017 of papillary tumor covering the left posterior lateral wall, left ureteral orifice and left bladder neck  There was also small tumor left lateral wall  All resected completely and left ureteral orifice was confirmed uninvolved with tumor and patent at the end of procedure  Pathology showed low-grade TCC without invasion  He then had mitomycin instilled, and then underwent 6 weeks of BCG installation  He is now here for his first surveillance cystoscopy  Elevated PSA:  PSA was 2 9 in May of 2017, then jumped up to 12  2017  He was having symptoms of hesitancy and may have had prostatitis at that time  We will eventually recheck this when things are calmed down from the BCG in his recent instrumentation      Cystoscopy Procedure Note        Pre-operative Diagnosis:  Bladder cancer    Post-operative Diagnosis:  Bladder cancer, with recurrence left posterior wall, erythematous lesions over left lateral trigone    Procedure: Flexible cystoscopy    Surgeon: Dustin Salas MD    Anesthesia: 1% Xylocaine per urethra    EBL: Minimal    Complications: none    Procedure Details   The risks, benefits, complications, treatment options, and expected outcomes were discussed with the patient  The patient concurred with the proposed plan, giving informed consent  Cystoscopy was performed today under local anesthesia, using sterile technique  The patient was placed in the supine position, prepped with Betadine, and draped in the usual sterile fashion  The flexible cystocope was used to inspect both the urethra and bladder    Findings:  Urethra:  Normal without stricture  Moderate BPH with obstruction  Bladder:  Smooth, not trabeculated and there is a tiny recurrence on the left posterior  lateral wall-there is some stellate scarring with erythema over the lateral trigone on the left, not involving the orifice  The orifices were orthotopic and intact  Specimens:  None                 Complications:  None           Disposition: To home            Condition:  Stable              The following portions of the patient's history were reviewed and updated as appropriate: allergies, current medications, past family history, past medical history, past social history, past surgical history and problem list     Review of Systems   Review of Systems    Active Problem List   There is no problem list on file for this patient  Objective   /70   Ht 5' 10 5" (1 791 m)   Wt 83 kg (183 lb)   BMI 25 89 kg/m²     Physical Exam   Constitutional: He is oriented to person, place, and time  He appears well-developed and well-nourished  HENT:   Head: Normocephalic and atraumatic  Eyes: EOM are normal    Neck: Normal range of motion  Cardiovascular: Normal rate, regular rhythm and normal heart sounds  Pulmonary/Chest: Effort normal and breath sounds normal  No respiratory distress  He has no wheezes  He has no rales  He exhibits no tenderness  Abdominal: Soft  He exhibits no distension and no mass  There is no tenderness   There is no rebound and no guarding  Genitourinary: Penis normal    Musculoskeletal: Normal range of motion  He exhibits no edema, tenderness or deformity  Neurological: He is alert and oriented to person, place, and time  Skin: Skin is warm and dry  Psychiatric: He has a normal mood and affect   His behavior is normal  Judgment and thought content normal            Current Medications     Current Outpatient Prescriptions:     Alpha-Lipoic Acid 600 MG CAPS, Take by mouth daily, Disp: , Rfl:     bisoprolol-hydrochlorothiazide (ZIAC) 2 5-6 25 MG per tablet, Take 1 tablet by mouth daily, Disp: , Rfl:     CINNAMON PO, Take 250 mg by mouth daily, Disp: , Rfl:     Krill Oil 500 MG CAPS, Take by mouth daily, Disp: , Rfl:     lisinopril (ZESTRIL) 10 mg tablet, Take 10 mg by mouth every evening, Disp: , Rfl:     Magnesium 250 MG TABS, Take by mouth daily, Disp: , Rfl:     Omega-3 Fatty Acids (FISH OIL) 1200 MG CAPS, Take by mouth daily, Disp: , Rfl:     Saw Palmetto, Serenoa repens, (SAW PALMETTO BERRIES PO), Take by mouth daily ResQ Prostate, Disp: , Rfl:     sildenafil (VIAGRA) 50 MG tablet, Take 50 mg by mouth daily as needed for erectile dysfunction, Disp: , Rfl:     Turmeric Curcumin 500 MG CAPS, Take by mouth daily, Disp: , Rfl:     benzonatate (TESSALON PERLES) 100 mg capsule, , Disp: , Rfl:     HYDROcodone-acetaminophen (NORCO) 5-325 mg per tablet, Take 1 tablet by mouth every 4 (four) hours as needed for pain for up to 20 doses Max Daily Amount: 6 tablets, Disp: 20 tablet, Rfl: 0    ofloxacin (OCUFLOX) 0 3 % ophthalmic solution, Apply to eye, Disp: , Rfl:     phenazopyridine (PYRIDIUM) 200 mg tablet, Take 1 tablet by mouth 3 (three) times a day as needed for bladder spasms, Disp: 10 tablet, Rfl: 0    tamsulosin (FLOMAX) 0 4 mg, Take 0 4 mg by mouth daily with dinner, Disp: , Rfl:     VENTOLIN  (90 Base) MCG/ACT inhaler, , Disp: , Rfl:         Elizabeth Card MD

## 2018-03-21 LAB — BACTERIA UR CULT: NORMAL

## 2018-04-10 ENCOUNTER — ANESTHESIA EVENT (OUTPATIENT)
Dept: PERIOP | Facility: HOSPITAL | Age: 68
End: 2018-04-10
Payer: MEDICARE

## 2018-04-10 RX ORDER — SODIUM CHLORIDE 9 MG/ML
125 INJECTION, SOLUTION INTRAVENOUS CONTINUOUS
Status: CANCELLED | OUTPATIENT
Start: 2018-04-25

## 2018-04-11 ENCOUNTER — APPOINTMENT (OUTPATIENT)
Dept: LAB | Facility: HOSPITAL | Age: 68
End: 2018-04-11
Attending: UROLOGY
Payer: MEDICARE

## 2018-04-11 ENCOUNTER — APPOINTMENT (OUTPATIENT)
Dept: PREADMISSION TESTING | Facility: HOSPITAL | Age: 68
End: 2018-04-11
Payer: MEDICARE

## 2018-04-11 DIAGNOSIS — C67.4 MALIGNANT NEOPLASM OF POSTERIOR WALL OF URINARY BLADDER (HCC): ICD-10-CM

## 2018-04-11 LAB
ALBUMIN SERPL BCP-MCNC: 4.1 G/DL (ref 3.5–5)
ALP SERPL-CCNC: 54 U/L (ref 46–116)
ALT SERPL W P-5'-P-CCNC: 20 U/L (ref 12–78)
ANION GAP SERPL CALCULATED.3IONS-SCNC: 10 MMOL/L (ref 4–13)
AST SERPL W P-5'-P-CCNC: 20 U/L (ref 5–45)
BASOPHILS # BLD AUTO: 0.02 THOUSANDS/ΜL (ref 0–0.1)
BASOPHILS NFR BLD AUTO: 1 % (ref 0–1)
BILIRUB SERPL-MCNC: 0.91 MG/DL (ref 0.2–1)
BUN SERPL-MCNC: 22 MG/DL (ref 5–25)
CALCIUM SERPL-MCNC: 9 MG/DL
CHLORIDE SERPL-SCNC: 105 MMOL/L (ref 100–108)
CO2 SERPL-SCNC: 28 MMOL/L (ref 21–32)
CREAT SERPL-MCNC: 1.04 MG/DL (ref 0.6–1.3)
EOSINOPHIL # BLD AUTO: 0.06 THOUSAND/ΜL (ref 0–0.61)
EOSINOPHIL NFR BLD AUTO: 1 % (ref 0–6)
ERYTHROCYTE [DISTWIDTH] IN BLOOD BY AUTOMATED COUNT: 13.8 % (ref 11.6–15.1)
GFR SERPL CREATININE-BSD FRML MDRD: 74 ML/MIN/1.73SQ M
GLUCOSE SERPL-MCNC: 120 MG/DL (ref 65–140)
HCT VFR BLD AUTO: 42.2 % (ref 36.5–49.3)
HGB BLD-MCNC: 13.8 G/DL (ref 12–17)
LYMPHOCYTES # BLD AUTO: 0.92 THOUSANDS/ΜL (ref 0.6–4.47)
LYMPHOCYTES NFR BLD AUTO: 21 % (ref 14–44)
MCH RBC QN AUTO: 29.9 PG (ref 26.8–34.3)
MCHC RBC AUTO-ENTMCNC: 32.7 G/DL (ref 31.4–37.4)
MCV RBC AUTO: 92 FL (ref 82–98)
MONOCYTES # BLD AUTO: 0.47 THOUSAND/ΜL (ref 0.17–1.22)
MONOCYTES NFR BLD AUTO: 11 % (ref 4–12)
NEUTROPHILS # BLD AUTO: 2.84 THOUSANDS/ΜL (ref 1.85–7.62)
NEUTS SEG NFR BLD AUTO: 66 % (ref 43–75)
NRBC BLD AUTO-RTO: 0 /100 WBCS
PLATELET # BLD AUTO: 187 THOUSANDS/UL (ref 149–390)
PMV BLD AUTO: 10.5 FL (ref 8.9–12.7)
POTASSIUM SERPL-SCNC: 3.7 MMOL/L (ref 3.5–5.3)
PROT SERPL-MCNC: 7.6 G/DL (ref 6.4–8.2)
RBC # BLD AUTO: 4.61 MILLION/UL (ref 3.88–5.62)
SODIUM SERPL-SCNC: 143 MMOL/L (ref 136–145)
WBC # BLD AUTO: 4.31 THOUSAND/UL (ref 4.31–10.16)

## 2018-04-11 PROCEDURE — 93005 ELECTROCARDIOGRAM TRACING: CPT

## 2018-04-11 PROCEDURE — 36415 COLL VENOUS BLD VENIPUNCTURE: CPT

## 2018-04-11 PROCEDURE — 85025 COMPLETE CBC W/AUTO DIFF WBC: CPT

## 2018-04-11 PROCEDURE — 80053 COMPREHEN METABOLIC PANEL: CPT

## 2018-04-11 RX ORDER — TAMSULOSIN HYDROCHLORIDE 0.4 MG/1
0.4 CAPSULE ORAL
COMMUNITY

## 2018-04-11 RX ORDER — MELATONIN
500 DAILY
COMMUNITY

## 2018-04-11 NOTE — ANESTHESIA PREPROCEDURE EVALUATION
Review of Systems/Medical History  Patient summary reviewed  Chart reviewed  No history of anesthetic complications     Cardiovascular  Hyperlipidemia, Hypertension , No CAD, ,    Pulmonary  Smoker ex-smoker  ,        GI/Hepatic  Negative GI/hepatic ROS          Negative  ROS Genitourinary malignancy Bladder cancer, Prostatic disorder, benign prostatic hyperplasia       Endo/Other  Negative endo/other ROS      GYN       Hematology  Negative hematology ROS      Musculoskeletal    Arthritis     Neurology  Negative neurology ROS      Psychology   Negative psychology ROS              Physical Exam    Airway    Mallampati score: II  TM Distance: >3 FB  Neck ROM: full     Dental   Comment: Missing one tooth,   Some crowns,     Cardiovascular  Rhythm: regular, Rate: normal, Cardiovascular exam normal    Pulmonary  Pulmonary exam normal Breath sounds clear to auscultation,     Other Findings        Anesthesia Plan  ASA Score- 2     Anesthesia Type- general with ASA Monitors  Additional Monitors:   Airway Plan:         Plan Factors-Patient not instructed to abstain from smoking on day of procedure  Patient did not smoke on day of surgery  Induction- intravenous  Postoperative Plan- Plan for postoperative opioid use  Informed Consent- Anesthetic plan and risks discussed with patient

## 2018-04-11 NOTE — PRE-PROCEDURE INSTRUCTIONS
Pre-Surgery Instructions:   Medication Instructions    Alpha-Lipoic Acid 600 MG CAPS Patient was instructed by Physician and understands   bisoprolol-hydrochlorothiazide (ZIAC) 2 5-6 25 MG per tablet Patient was instructed by Physician and understands   cholecalciferol (VITAMIN D3) 1,000 units tablet Patient was instructed by Physician and understands   CINNAMON PO Patient was instructed by Physician and understands   Krill Oil 500 MG CAPS Patient was instructed by Physician and understands   lisinopril (ZESTRIL) 10 mg tablet Patient was instructed by Physician and understands   Magnesium 250 MG TABS Patient was instructed by Physician and understands   Omega-3 Fatty Acids (FISH OIL PO) Patient was instructed by Physician and understands   Probiotic Product (PROBIOTIC PO) Patient was instructed by Physician and understands   Saw Palmetto, Serenoa repens, (SAW PALMETTO BERRIES PO) Patient was instructed by Physician and understands   tamsulosin (FLOMAX) 0 4 mg Patient was instructed by Physician and understands   Turmeric Curcumin 500 MG CAPS Patient was instructed by Physician and understands  Pt given [facility] preop instructions and reviewed with pt  Pt given Chlorhexidine

## 2018-04-13 LAB
ATRIAL RATE: 65 BPM
P AXIS: 58 DEGREES
PR INTERVAL: 166 MS
QRS AXIS: 46 DEGREES
QRSD INTERVAL: 116 MS
QT INTERVAL: 408 MS
QTC INTERVAL: 424 MS
T WAVE AXIS: 16 DEGREES
VENTRICULAR RATE: 65 BPM

## 2018-04-13 PROCEDURE — 93010 ELECTROCARDIOGRAM REPORT: CPT | Performed by: INTERNAL MEDICINE

## 2018-04-25 ENCOUNTER — HOSPITAL ENCOUNTER (OUTPATIENT)
Facility: HOSPITAL | Age: 68
Setting detail: OUTPATIENT SURGERY
Discharge: HOME | End: 2018-04-25
Attending: UROLOGY | Admitting: UROLOGY
Payer: MEDICARE

## 2018-04-25 ENCOUNTER — ANESTHESIA (OUTPATIENT)
Dept: PERIOP | Facility: HOSPITAL | Age: 68
End: 2018-04-25
Payer: MEDICARE

## 2018-04-25 ENCOUNTER — HOSPITAL ENCOUNTER (OUTPATIENT)
Dept: RADIOLOGY | Facility: HOSPITAL | Age: 68
Setting detail: OUTPATIENT SURGERY
Discharge: HOME | End: 2018-04-25
Payer: MEDICARE

## 2018-04-25 VITALS
SYSTOLIC BLOOD PRESSURE: 135 MMHG | RESPIRATION RATE: 16 BRPM | OXYGEN SATURATION: 98 % | HEIGHT: 71 IN | TEMPERATURE: 97.4 F | BODY MASS INDEX: 25.2 KG/M2 | WEIGHT: 180 LBS | HEART RATE: 61 BPM | DIASTOLIC BLOOD PRESSURE: 80 MMHG

## 2018-04-25 DIAGNOSIS — C67.4 MALIGNANT NEOPLASM OF POSTERIOR WALL OF URINARY BLADDER (HCC): ICD-10-CM

## 2018-04-25 LAB
ATRIAL RATE: 66 BPM
GLUCOSE SERPL-MCNC: 107 MG/DL (ref 65–140)
P AXIS: 67 DEGREES
PR INTERVAL: 168 MS
QRS AXIS: 65 DEGREES
QRSD INTERVAL: 116 MS
QT INTERVAL: 420 MS
QTC INTERVAL: 440 MS
T WAVE AXIS: 58 DEGREES
VENTRICULAR RATE: 66 BPM

## 2018-04-25 PROCEDURE — C1758 CATHETER, URETERAL: HCPCS | Performed by: UROLOGY

## 2018-04-25 PROCEDURE — 52351 CYSTOURETERO & OR PYELOSCOPE: CPT | Performed by: UROLOGY

## 2018-04-25 PROCEDURE — 52234 CYSTOSCOPY AND TREATMENT: CPT | Performed by: UROLOGY

## 2018-04-25 PROCEDURE — 93005 ELECTROCARDIOGRAM TRACING: CPT | Performed by: UROLOGY

## 2018-04-25 PROCEDURE — 93010 ELECTROCARDIOGRAM REPORT: CPT | Performed by: INTERNAL MEDICINE

## 2018-04-25 PROCEDURE — 88305 TISSUE EXAM BY PATHOLOGIST: CPT | Performed by: PATHOLOGY

## 2018-04-25 PROCEDURE — C1769 GUIDE WIRE: HCPCS | Performed by: UROLOGY

## 2018-04-25 PROCEDURE — C1894 INTRO/SHEATH, NON-LASER: HCPCS | Performed by: UROLOGY

## 2018-04-25 PROCEDURE — 51720 TREATMENT OF BLADDER LESION: CPT | Performed by: UROLOGY

## 2018-04-25 PROCEDURE — 82948 REAGENT STRIP/BLOOD GLUCOSE: CPT

## 2018-04-25 PROCEDURE — 74450 X-RAY URETHRA/BLADDER: CPT

## 2018-04-25 RX ORDER — SORBITOL 30 G/1000ML
IRRIGANT IRRIGATION AS NEEDED
Status: DISCONTINUED | OUTPATIENT
Start: 2018-04-25 | End: 2018-04-25 | Stop reason: HOSPADM

## 2018-04-25 RX ORDER — FENTANYL CITRATE/PF 50 MCG/ML
25 SYRINGE (ML) INJECTION
Status: DISCONTINUED | OUTPATIENT
Start: 2018-04-25 | End: 2018-04-25 | Stop reason: HOSPADM

## 2018-04-25 RX ORDER — SODIUM CHLORIDE 9 MG/ML
125 INJECTION, SOLUTION INTRAVENOUS CONTINUOUS
Status: DISCONTINUED | OUTPATIENT
Start: 2018-04-25 | End: 2018-04-25 | Stop reason: HOSPADM

## 2018-04-25 RX ORDER — EPHEDRINE SULFATE 50 MG/ML
INJECTION, SOLUTION INTRAVENOUS AS NEEDED
Status: DISCONTINUED | OUTPATIENT
Start: 2018-04-25 | End: 2018-04-25 | Stop reason: SURG

## 2018-04-25 RX ORDER — MIDAZOLAM HYDROCHLORIDE 1 MG/ML
INJECTION INTRAMUSCULAR; INTRAVENOUS AS NEEDED
Status: DISCONTINUED | OUTPATIENT
Start: 2018-04-25 | End: 2018-04-25 | Stop reason: SURG

## 2018-04-25 RX ORDER — CEPHALEXIN 500 MG/1
500 CAPSULE ORAL EVERY 6 HOURS SCHEDULED
Qty: 16 CAPSULE | Refills: 0 | Status: SHIPPED | OUTPATIENT
Start: 2018-04-25 | End: 2018-04-29

## 2018-04-25 RX ORDER — HYDROCODONE BITARTRATE AND ACETAMINOPHEN 5; 325 MG/1; MG/1
1-2 TABLET ORAL EVERY 4 HOURS PRN
Qty: 20 TABLET | Refills: 0 | Status: SHIPPED | OUTPATIENT
Start: 2018-04-25 | End: 2018-05-05

## 2018-04-25 RX ORDER — FENTANYL CITRATE 50 UG/ML
INJECTION, SOLUTION INTRAMUSCULAR; INTRAVENOUS AS NEEDED
Status: DISCONTINUED | OUTPATIENT
Start: 2018-04-25 | End: 2018-04-25 | Stop reason: SURG

## 2018-04-25 RX ORDER — ONDANSETRON 2 MG/ML
INJECTION INTRAMUSCULAR; INTRAVENOUS AS NEEDED
Status: DISCONTINUED | OUTPATIENT
Start: 2018-04-25 | End: 2018-04-25 | Stop reason: SURG

## 2018-04-25 RX ORDER — ONDANSETRON 2 MG/ML
4 INJECTION INTRAMUSCULAR; INTRAVENOUS ONCE AS NEEDED
Status: DISCONTINUED | OUTPATIENT
Start: 2018-04-25 | End: 2018-04-25 | Stop reason: HOSPADM

## 2018-04-25 RX ORDER — LIDOCAINE HYDROCHLORIDE 10 MG/ML
INJECTION, SOLUTION INFILTRATION; PERINEURAL AS NEEDED
Status: DISCONTINUED | OUTPATIENT
Start: 2018-04-25 | End: 2018-04-25 | Stop reason: SURG

## 2018-04-25 RX ORDER — HYDROCODONE BITARTRATE AND ACETAMINOPHEN 5; 325 MG/1; MG/1
1 TABLET ORAL EVERY 6 HOURS PRN
Status: DISCONTINUED | OUTPATIENT
Start: 2018-04-25 | End: 2018-04-25 | Stop reason: HOSPADM

## 2018-04-25 RX ORDER — PROPOFOL 10 MG/ML
INJECTION, EMULSION INTRAVENOUS AS NEEDED
Status: DISCONTINUED | OUTPATIENT
Start: 2018-04-25 | End: 2018-04-25 | Stop reason: SURG

## 2018-04-25 RX ADMIN — MITOMYCIN 40 MG: 40 INJECTION, POWDER, LYOPHILIZED, FOR SOLUTION INTRAVENOUS at 12:37

## 2018-04-25 RX ADMIN — SODIUM CHLORIDE: 0.9 INJECTION, SOLUTION INTRAVENOUS at 12:28

## 2018-04-25 RX ADMIN — LIDOCAINE HYDROCHLORIDE 60 MG: 10 INJECTION, SOLUTION INFILTRATION; PERINEURAL at 11:47

## 2018-04-25 RX ADMIN — EPHEDRINE SULFATE 10 MG: 50 INJECTION, SOLUTION INTRAMUSCULAR; INTRAVENOUS; SUBCUTANEOUS at 12:41

## 2018-04-25 RX ADMIN — FENTANYL CITRATE 50 MCG: 50 INJECTION, SOLUTION INTRAMUSCULAR; INTRAVENOUS at 11:49

## 2018-04-25 RX ADMIN — FENTANYL CITRATE 25 MCG: 50 INJECTION, SOLUTION INTRAMUSCULAR; INTRAVENOUS at 12:00

## 2018-04-25 RX ADMIN — SODIUM CHLORIDE 125 ML/HR: 0.9 INJECTION, SOLUTION INTRAVENOUS at 10:56

## 2018-04-25 RX ADMIN — Medication 2000 MG: at 11:56

## 2018-04-25 RX ADMIN — FENTANYL CITRATE 25 MCG: 50 INJECTION INTRAMUSCULAR; INTRAVENOUS at 13:35

## 2018-04-25 RX ADMIN — MIDAZOLAM HYDROCHLORIDE 2 MG: 1 INJECTION, SOLUTION INTRAMUSCULAR; INTRAVENOUS at 11:38

## 2018-04-25 RX ADMIN — DEXAMETHASONE SODIUM PHOSPHATE 4 MG: 10 INJECTION INTRAMUSCULAR; INTRAVENOUS at 12:01

## 2018-04-25 RX ADMIN — HYDROCODONE BITARTRATE AND ACETAMINOPHEN 1 TABLET: 5; 325 TABLET ORAL at 15:00

## 2018-04-25 RX ADMIN — SODIUM CHLORIDE 125 ML/HR: 0.9 INJECTION, SOLUTION INTRAVENOUS at 13:58

## 2018-04-25 RX ADMIN — ONDANSETRON HYDROCHLORIDE 4 MG: 2 INJECTION, SOLUTION INTRAVENOUS at 12:01

## 2018-04-25 RX ADMIN — FENTANYL CITRATE 25 MCG: 50 INJECTION, SOLUTION INTRAMUSCULAR; INTRAVENOUS at 12:21

## 2018-04-25 RX ADMIN — PROPOFOL 200 MG: 10 INJECTION, EMULSION INTRAVENOUS at 11:47

## 2018-04-25 NOTE — DISCHARGE INSTRUCTIONS
Expect to see blood in the urine, and have urgency frequency and burning with urination  This may persist for over a week  Left flank pain is also normal because you had ureteroscopy  Takes the [address] as prescribed, and you can take ibuprofen as well  Drink plenty of fluids  You may resume usual activities tomorrow but I would advised not to do heavy lifting for another week or 2  Call my office to see me in 2 weeks to go over pathology and to determine further steps     Call for fever greater than 101 5°, inability to urinate or severe pain not relieved by the pain medications  Expect to see some clots  On ureteroscopy, your left kidney was normal as well as the ureter  I resected the tumor that I saw in the office and I saw another small area was concerned about at the ureteral orifice which I also resected  Resume  regular diet and take over-the-counter remedies to facilitate bowel movements  Transurethral Resection of Bladder Tumors   WHAT YOU NEED TO KNOW:   Transurethral resection of bladder tumors (TURBT) is surgery to remove one or more tumors from your bladder  DISCHARGE INSTRUCTIONS:   Medicines:   · Medicines  help decrease pain or prevent vomiting  · Take your medicine as directed  Contact your healthcare provider if you think your medicine is not helping or if you have side effects  Tell him or her if you are allergic to any medicine  Keep a list of the medicines, vitamins, and herbs you take  Include the amounts, and when and why you take them  Bring the list or the pill bottles to follow-up visits  Carry your medicine list with you in case of an emergency  Follow up with your healthcare provider as directed:  Write down your questions so you remember to ask them during your visits  Care for your [employer] catheter:  Keep the bag below your waist  This will prevent urine from flowing back into your bladder and causing an infection or other problems   Also, keep the tube free of kinks so the urine will drain properly  Do not pull on the catheter  This can cause pain and bleeding and may cause the catheter to come out  Empty your urine drainage bag when it is ½ to ? full, or every 8 hours  If you have a smaller leg bag, empty it every 3 to 4 hours  Do the following when you empty your urine drainage bag:  · Hold the urine bag over the toilet or a large container  · Remove the drain spout from its sleeve at the bottom of the urine bag  Do not touch the tip of the drain spout  Open the slide valve on the spout  · Let the urine flow out of the urine bag into the toilet or container  Do not let the drainage tube touch anything  · Clean the end of the drain spout with alcohol when the bag is empty  Ask which cleaning solution is best to use  · Close the slide valve and put the drain spout into its sleeve at the bottom of the urine bag  Write down how much urine was in your bag if you were asked to keep a record  Contact your healthcare provider if:   · You have a fever or chills  · You have new or more blood in your urine  · You have nausea or are vomiting  · You have new or more pain when you urinate  · You are unable to control when you urinate  · You have questions or concerns about your condition or care  Seek care immediately or call 911 if:   · You have heavy bleeding from your urethra  · You start to urinate less often, very little, or not at all  · You have severe pain in your abdomen or pelvis  © 2017 [address]  Information is for End User's use only and may not be sold, redistributed or otherwise used for commercial purposes  All illustrations and images included in CareNotes® are the copyrighted property of [employer]  or Eriberto Arellano  The above information is an  only  It is not intended as medical advice for individual conditions or treatments   Talk to your doctor, nurse or pharmacist before following any medical regimen to see if it is safe and effective for you

## 2018-04-25 NOTE — OP NOTE
OPERATIVE REPORT  PATIENT NAME: Tiburcio Pierce    :  1950  MRN: [id number]  Pt Location: [facility] OR ROOM 06    SURGERY DATE: 2018    Surgeon(s) and Role:     * Eros Farias MD - Primary    Preop Diagnosis:  Malignant neoplasm of posterior wall of urinary bladder ([facility] [address] ) [C67 4]    Post-Op Diagnosis Codes:     * Malignant neoplasm of posterior wall of urinary bladder (HCC) [c67 4], neoplasm of left ureteral orifice    Procedure(s) (LRB):  TRANSURETHRAL RESECTION OF BLADDER TUMOR (TURBT) (N/A)  CYSTOSCOPY WITH RETROGRADE PYELOGRAM (N/A)  INSTILLATION MITOMYCIN (N/A)   Left ureteroscopy    Specimen(s):  ID Type Source Tests Collected by Time Destination   1 : BLADDER TUMOR LEFT POSTERIOR WALL Tissue Soft Tissue, Other TISSUE EXAM Eros Farias MD 2018 1227    2 : LEFT URETERAL ORIFACE Tissue Soft Tissue, Other TISSUE EXAM Eros Farias MD 2018 1228        Estimated Blood Loss:   Minimal    Drains:  None       Anesthesia Type:   General    Operative Indications:  Malignant neoplasm of posterior wall of urinary bladder ([facility] [address] ) [C67 4]      Operative Findings:  Small papillary tumor posterior wall the bladder  No recurrence of previous resection site  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, which had previously been resected  Abnormal retrograde necessitating left ureteroscopy, which revealed no abnormalities  Complications:   None    Procedure and Technique:  The patient is a 60-year-old man with a history of bladder cancer who was undergoing surveillance cystoscopy and I found a recurrence of the left posterior wall  This was a small papillary recurrence, and given his history I wish to do cystoscopy, trans urethral resection of this tumor, and left retrograde pyelography  The risks of bleeding infection and damage to the urinary tract were explained  We will also instill mitomycin 40 mg in 40 cc into the bladder to try to prevent recurrence      The patient was brought to the operating room and identified properly  LMA was induced the patient was prepped and draped in the dorsal lithotomy position in the usual fashion  A time-out was performed and cystoscopy was carried out with a 22 [place] cystoscopy sheath and 30 degree lens  The urethra was normal without stricture  The prostate showed moderate obstruction with hypertrophy and a median lobe  The bladder itself was smooth not trabeculated and there was a papillary recurrence of the posterior wall the bladder as seen in the office cystoscopy  His previous resection site looks clean  This was lateral to the left trigone  However there was some papillary over growth of the left ureteral orifice I was concerned about  I therefore first performed left retrograde pyelography  50% Conray was injected through an open-ended ureteral catheter up the left ureter and into the renal pelvis and calices  Although not injected with Force, there was some extravasation from the renal pelvis and calices  I was also concerned about the look of some the lower pole calices for the possibility of clot or tumor  Also, the dye that effluxed from the ureter after remove the catheter became bloody  This made me decide to perform left ureteroscopy to make sure there was not a proximal tumor that was seeding the lower tracts  There were no ureteral abnormalities  I first performed trans urethral resection of the posterior wall bladder tumor  This was done with cup biopsy forceps due to its small size  I was able to resected entirely with the cup biopsy forceps and avoid cautery artifact  I took the bite to make sure I had muscle in the specimen  This was sent out to pathology and then I resected the left ureteral orifice in the same fashion without using cautery to avoid stricture  The left ureteral orifice was patent at the end of this procedure and I fulgurated the base of the posterior wall tumor  This was done with Bugbee electrode  I then placed a guidewire up the left ureter up into the kidney under fluoroscopic guidance and then placed a second wire with the dual-lumen catheter  I then attempted to perform ureteroscopy but I could not get the ureteral scope through the proximal ureter for some reason  I could not see any direct stricture I therefore placed a ureteral access sheath all the way up into the renal pelvis  I then was able to perform panendoscopy of the renal pelvis and calices on the left and this revealed no abnormalities  There was just some mild wire trauma  There are no stones or tumors  I withdrew the scope and I performed endoscopy of the entire ureter and saw no tumors  There is minimal to no bleeding from the left ureteral orifice where I had resected, and this area was now neoplasm free  I drained the bladder and I placed a [employer] [place] [employer] catheter, and 40 mg of mitomycin C in 40 cc was instilled into the bladder and clamped  This was kept for 30 minutes and then the catheter was to be removed in the recovery room     I was present for the entire procedure and A qualified resident physician was not available    Patient Disposition:  PACU  and extubated and stable    SIGNATURE: Celso Holden MD  DATE: April 25, 2018  TIME: 12:50 PM

## 2018-04-25 NOTE — H&P
H&P Exam - Urology   Ana Angelo 78 y o  male MRN: [id number]  Unit/Bed#: OR [place] Encounter: [id number]    Assessment/Plan     Assessment:  Bladder CA with recurrence left posterolateral wall  See previous H/P 3/20/2018  Plan:  Cysto, TURBT, bilateral retrogrades, Mitomycin c instillation    History of Present Illness   HPI:  Ana Angelo is a 78 y o  male who presents with recurrence of bladder Ca  See H/P 3/20 for details This is an update- no changes, patient reexamined       Review of Systems   All other systems reviewed and are negative  Historical Information   Past Medical History:   Diagnosis Date    Arthritis     Bladder tumor     BPH with obstruction/lower urinary tract symptoms     Cancer [facility])     bladder    Cold     11/15 symptoms/to call Dr Eduardo Beauchamp if they persist or worsen    Dental crowns present     Elevated PSA     Hematuria, microscopic     History of total left knee replacement     twice one 2011/ and revision 2017    Hyperlipidemia     Hypertension     Irregular heart beat     Neuropathy     in feet    Prediabetes     Prostatitis, acute 09/18/2017    Seasonal allergies     Tooth missing     Wears glasses      Past Surgical History:   Procedure Laterality Date    CARDIAC CATHETERIZATION      approx 15 yrs ago for irreg heart beat at [facility]    COLONOSCOPY      CYSTOSCOPY      HERNIA REPAIR Right     inguinal    JOINT REPLACEMENT Left     knee    KNEE ARTHROSCOPY Left 1991    ACL    LIPOMA RESECTION      AR CYSTOURETHROSCOPY N/A 11/29/2017    Procedure: CYSTOSCOPY;  Surgeon: Luisa Raymundo MD;  [facility];  Service: Urology    AR CYSTOURETHROSCOPY,FULGUR 2-5 CM LESN N/A 11/29/2017    Procedure: TRANSURETHRAL RESECTION OF BLADDER TUMOR (TURBT);   Surgeon: Luisa Raymundo MD;  [facility];  Service: Urology    AR INSTILL ANTICANCER AGENT IN BLADDER N/A 11/29/2017    Procedure: Camilo Stewartr;  Surgeon: Luisa Raymundo MD;  [facility]; Service: Urology    SKIN BIOPSY      TONSILLECTOMY       Social History   History   Alcohol Use    Yes     Comment: 1 x a month     History   Drug Use No     History   Smoking Status    Never Smoker   Smokeless Tobacco    Never Used     Family History: non-contributory    Meds/Allergies   PTA meds:   Prior to Admission Medications   Prescriptions Last Dose Informant Patient Reported? Taking? Alpha-Lipoic Acid 600 MG CAPS 4/24/2018 at 0900 Self Yes Yes   Sig: Take 600 mg by mouth daily     CINNAMON PO 4/24/2018 at 0900 Self Yes Yes   Sig: Take 500 mg by mouth daily     Krill Oil 500 MG CAPS 4/24/2018 at 2000 Self Yes Yes   Sig: Take 500 mg by mouth daily     Magnesium 250 MG TABS 4/24/2018 at 0900 Self Yes Yes   Sig: Take 250 mg by mouth daily     Omega-3 Fatty Acids (FISH OIL PO) 4/24/2018 at 0900 Self Yes Yes   Sig: Take 1,400 mg by mouth daily   Probiotic Product (PROBIOTIC PO) 4/24/2018 at 07334 Self Yes Yes   Sig: Take 1 capsule by mouth daily   Saw Palmetto, Serenoa repens, (SAW PALMETTO BERRIES PO) 4/24/2018 at 0900 Self Yes Yes   Sig: Take 1 tablet by mouth daily ResQ Prostate     Turmeric Curcumin 500 MG CAPS 4/24/2018 at 0900 Self Yes Yes   Sig: Take 500 mg by mouth daily     bisoprolol-hydrochlorothiazide (ZIAC) 2 5-6 25 MG per tablet 4/25/2018 at 0800 Self Yes Yes   Sig: Take 1 tablet by mouth daily   cholecalciferol (VITAMIN D3) 1,000 units tablet 4/24/2018 at 0900 Self Yes Yes   Sig: Take 1,000 Units by mouth daily   lisinopril (ZESTRIL) 10 mg tablet 4/24/2018 at 2000 Self Yes Yes   Sig: Take 10 mg by mouth every evening   tamsulosin (FLOMAX) 0 4 mg 4/24/2018 at 1800 Self Yes Yes   Sig: Take 0 4 mg by mouth daily with dinner      Facility-Administered Medications: None     No Known Allergies    Objective   Vitals: Blood pressure 124/79, pulse 68, temperature (!) 97 3 °F (36 3 °C), temperature source Tympanic, resp  rate 16, height 5' 10 5" (1 791 m), weight 81 6 kg (180 lb), SpO2 99 %      No intake/output data recorded  Invasive Devices     Peripheral Intravenous Line            Peripheral IV 04/25/18 Left Forearm less than 1 day                Physical Exam   Constitutional: He is oriented to person, place, and time  He appears well-developed and well-nourished  HENT:   Head: Normocephalic and atraumatic  Eyes: EOM are normal    Neck: Normal range of motion  Neck supple  No thyromegaly present  Cardiovascular: Normal rate and regular rhythm  Exam reveals no gallop and no friction rub  No murmur heard  Pulmonary/Chest: Effort normal and breath sounds normal  No respiratory distress  He has no wheezes  He has no rales  He exhibits no tenderness  Abdominal: Soft  He exhibits no distension and no mass  There is no tenderness  There is no rebound and no guarding  Musculoskeletal: Normal range of motion  Neurological: He is alert and oriented to person, place, and time  Skin: Skin is warm and dry  Psychiatric: He has a normal mood and affect  His behavior is normal  Judgment and thought content normal        Lab Results: I have personally reviewed pertinent reports  CBC: No results found for: WBC, HGB, HCT, MCV, PLT, ADJUSTEDWBC, MCH, MCHC, RDW, MPV, NRBC  CMP: No results found for: NA, CL, CO2, ANIONGAP, BUN, CREATININE, GLUCOSE, CALCIUM, AST, ALT, ALKPHOS, PROT, ALBUMIN, BILITOT, EGFR  Imaging: I have personally reviewed pertinent reports  EKG, Pathology, and Other Studies: I have personally reviewed pertinent reports      VTE Prophylaxis: Sequential compression device [facility])     Code Status: No Order  Advance Directive and Living Will:      Power of :    POLST:

## 2018-05-08 ENCOUNTER — OFFICE VISIT (OUTPATIENT)
Dept: UROLOGY | Facility: MEDICAL CENTER | Age: 68
End: 2018-05-08
Payer: MEDICARE

## 2018-05-08 VITALS
SYSTOLIC BLOOD PRESSURE: 130 MMHG | WEIGHT: 180 LBS | DIASTOLIC BLOOD PRESSURE: 80 MMHG | BODY MASS INDEX: 25.77 KG/M2 | HEIGHT: 70 IN

## 2018-05-08 DIAGNOSIS — R97.20 ELEVATED PSA: ICD-10-CM

## 2018-05-08 DIAGNOSIS — C67.4 MALIGNANT NEOPLASM OF POSTERIOR WALL OF URINARY BLADDER (HCC): Primary | ICD-10-CM

## 2018-05-08 LAB
SL AMB  POCT GLUCOSE, UA: ABNORMAL
SL AMB LEUKOCYTE ESTERASE,UA: ABNORMAL
SL AMB POCT BILIRUBIN,UA: ABNORMAL
SL AMB POCT BLOOD,UA: ABNORMAL
SL AMB POCT CLARITY,UA: CLEAR
SL AMB POCT COLOR,UA: YELLOW
SL AMB POCT KETONES,UA: ABNORMAL
SL AMB POCT NITRITE,UA: ABNORMAL
SL AMB POCT PH,UA: 6.5
SL AMB POCT SPECIFIC GRAVITY,UA: 1.02
SL AMB POCT URINE PROTEIN: ABNORMAL
SL AMB POCT UROBILINOGEN: 0.2

## 2018-05-08 PROCEDURE — 81003 URINALYSIS AUTO W/O SCOPE: CPT | Performed by: UROLOGY

## 2018-05-08 PROCEDURE — 99212 OFFICE O/P EST SF 10 MIN: CPT | Performed by: UROLOGY

## 2018-05-08 NOTE — PROGRESS NOTES
[address] [facility]    [address] [facility]  [place], [address] [facility]  [phone]  www  [facility]  org      NAME: Jonathan Benoit  AGE: 78 y o  SEX: male  : 1950   MRN: [id number]    DATE: 2018  TIME: 8:48 AM    Assessment and Plan:  Bladder cancer as below  Resume 3 monthly cystoscopic surveillance  Check PSA when he comes back  Chief Complaint   No chief complaint on file  History of Present Illness   Status post cystoscopy and trans urethral resection of a small papillary tumor in the posterior wall the bladder, and possible involvement of the left ureteral orifice  Retrograde pyelography showed extravasation and some abnormalities of the collecting system, so I performed ureteroscopy at that time which was completely normal   I resected the orifice and the other tumor with cup biopsy forceps deep, in the posterior wall tumor shows a noninvasive low-grade papillary urothelial carcinoma, no muscle in the specimen but I was able see muscle when I was removing the tumor and I feel confident it is completely removed  The suspicious looking area of the left ureteral orifice showed a noninvasive low-grade papillary lesion favor a papillary urothelial neoplasm of low malignant potential   In other words no definite cancer seen in the ureteral orifice specimen  He also had mitomycin placed  He recently had a trans urethral resection of the bladder tumor on 2017 of a papillary tumor that was covering left posterior wall left ureteral orifice and left bladder neck  There is also small tumor left lateral wall  He had mitomycin instilled and then underwent 6 weeks of BCG installation  He is doing well after this procedure  No major complaints  Elevated PSA:  PSA was 2 9 May of 2017 it jumped to 12 in 2017  He was having symptoms of hesitancy and may have had prostatitis at that time    He he had a PSA drawn right after the operation which was 4 2  However this may also be artificially elevated due to the instrumentation  The following portions of the patient's history were reviewed and updated as appropriate: allergies, current medications, past family history, past medical history, past social history, past surgical history and problem list     Review of Systems   Review of Systems    Active Problem List     Patient Active Problem List   Diagnosis    Malignant neoplasm of posterior wall of urinary bladder ([address] )       Objective   There were no vitals taken for this visit      Physical Exam        Current Medications     Current Outpatient Prescriptions:     Alpha-Lipoic Acid 600 MG CAPS, Take 600 mg by mouth daily  , Disp: , Rfl:     bisoprolol-hydrochlorothiazide (ZIAC) 2 5-6 25 MG per tablet, Take 1 tablet by mouth daily, Disp: , Rfl:     cholecalciferol (VITAMIN D3) 1,000 units tablet, Take 1,000 Units by mouth daily, Disp: , Rfl:     CINNAMON PO, Take 500 mg by mouth daily  , Disp: , Rfl:     Krill Oil 500 MG CAPS, Take 500 mg by mouth daily  , Disp: , Rfl:     lisinopril (ZESTRIL) 10 mg tablet, Take 10 mg by mouth every evening, Disp: , Rfl:     Magnesium 250 MG TABS, Take 250 mg by mouth daily  , Disp: , Rfl:     Omega-3 Fatty Acids (FISH OIL PO), Take 1,400 mg by mouth daily, Disp: , Rfl:     Probiotic Product (PROBIOTIC PO), Take 1 capsule by mouth daily, Disp: , Rfl:     Saw Palmetto, Serenoa repens, (SAW PALMETTO BERRIES PO), Take 1 tablet by mouth daily ResQ Prostate  , Disp: , Rfl:     tamsulosin (FLOMAX) 0 4 mg, Take 0 4 mg by mouth daily with dinner, Disp: , Rfl:     Turmeric Curcumin 500 MG CAPS, Take 500 mg by mouth daily  , Disp: , Rfl:         Unknown MD Felicia

## 2018-08-09 ENCOUNTER — PROCEDURE VISIT (OUTPATIENT)
Dept: UROLOGY | Facility: MEDICAL CENTER | Age: 68
End: 2018-08-09
Payer: MEDICARE

## 2018-08-09 VITALS
HEIGHT: 70 IN | WEIGHT: 184 LBS | SYSTOLIC BLOOD PRESSURE: 134 MMHG | DIASTOLIC BLOOD PRESSURE: 80 MMHG | BODY MASS INDEX: 26.34 KG/M2

## 2018-08-09 DIAGNOSIS — C67.4 MALIGNANT NEOPLASM OF POSTERIOR WALL OF URINARY BLADDER (HCC): Primary | ICD-10-CM

## 2018-08-09 LAB
SL AMB  POCT GLUCOSE, UA: NEGATIVE
SL AMB LEUKOCYTE ESTERASE,UA: NEGATIVE
SL AMB POCT BILIRUBIN,UA: NEGATIVE
SL AMB POCT BLOOD,UA: ABNORMAL
SL AMB POCT CLARITY,UA: CLEAR
SL AMB POCT COLOR,UA: YELLOW
SL AMB POCT KETONES,UA: NEGATIVE
SL AMB POCT NITRITE,UA: NEGATIVE
SL AMB POCT PH,UA: 5.5
SL AMB POCT SPECIFIC GRAVITY,UA: 1.01
SL AMB POCT URINE PROTEIN: NEGATIVE
SL AMB POCT UROBILINOGEN: 0.2

## 2018-08-09 PROCEDURE — 99212 OFFICE O/P EST SF 10 MIN: CPT | Performed by: UROLOGY

## 2018-08-09 PROCEDURE — 81003 URINALYSIS AUTO W/O SCOPE: CPT | Performed by: UROLOGY

## 2018-08-09 NOTE — PROGRESS NOTES
[address] [facility]  [facility]  [address] [facility], [address] [place] [address]  [phone]  www  [facility]  org      NAME: Lee Ann Ashley  AGE: 80 y o  SEX: male  : 1950   MRN: [id number]    DATE: 2018  TIME: 9:16 AM    Assessment and Plan:  Bladder cancer:  No evidence recurrence 4 months after last TURBT and mitomycin  Follow-up 3 months with a cystoscopy  Elevated PSA, normalized:  Down to 3 1  Chief Complaint     Chief Complaint   Patient presents with    Cystoscopy       History of Present Illness   Here for follow-up of bladder cancer with 3 month surveillance cystoscopy status post TURBT 2018 of a posterior wall tumor and a neoplasm of low malignant potential of the left ureteral orifice  with mitomycin, and elevated PSA  His PSA is normalized to 3 1 as of 2018     It was 4 2 before  With regards to his bladder cancer, this is his second occurrence-the first occurrence was 2017 of a papillary tumor that was covering the left posterior wall and left ureteral orifice and left bladder neck  He had mitomycin instilled at that time and then underwent 6 weeks of BCG installation  Cystoscopy Procedure Note        Pre-operative Diagnosis:  Bladder cancer    Post-operative Diagnosis:     Procedure: Flexible cystoscopy    Surgeon: Arsenio Kramer MD    Anesthesia: 1% Xylocaine per urethra    EBL: Minimal    Complications: none    Procedure Details   The risks, benefits, complications, treatment options, and expected outcomes were discussed with the patient  The patient concurred with the proposed plan, giving informed consent  Cystoscopy was performed today under local anesthesia, using sterile technique  The patient was placed in the supine position, prepped with Betadine, and draped in the usual sterile fashion   The flexible cystocope was used to inspect both the urethra and bladder    Findings:  Urethra:  Normal without stricture  Prostate is moderately obstructive  Bladder:  Smooth, not trabeculated and there were no stones tumors or other lesions  The orifices were orthotopic and intact  The left ureteral orifice has been resected and is widely patent and there are no lesions  No recurrence of bladder tumors  Specimens:  None                 Complications:  None           Disposition: To home            Condition:  Stable          The following portions of the patient's history were reviewed and updated as appropriate: allergies, current medications, past family history, past medical history, past social history, past surgical history and problem list     Review of Systems   Review of Systems   Genitourinary: Negative  Active Problem List     Patient Active Problem List   Diagnosis    Malignant neoplasm of posterior wall of urinary bladder (HCC)       Objective   /80   Ht 5' 10" (1 778 m)   Wt 83 5 kg (184 lb)   BMI 26 40 kg/m²     Physical Exam   Constitutional: He is oriented to person, place, and time  He appears well-developed and well-nourished  HENT:   Head: Normocephalic and atraumatic  Eyes: EOM are normal    Neck: Normal range of motion  Pulmonary/Chest: Effort normal    Genitourinary: Penis normal    Musculoskeletal: Normal range of motion  Neurological: He is alert and oriented to person, place, and time  Skin: Skin is warm and dry  Psychiatric: He has a normal mood and affect   His behavior is normal  Judgment and thought content normal            Current Medications     Current Outpatient Prescriptions:     Alpha-Lipoic Acid 600 MG CAPS, Take 600 mg by mouth daily  , Disp: , Rfl:     bisoprolol-hydrochlorothiazide (ZIAC) 2 5-6 25 MG per tablet, Take 1 tablet by mouth daily, Disp: , Rfl:     cholecalciferol (VITAMIN D3) 1,000 units tablet, Take 1,000 Units by mouth daily, Disp: , Rfl:     CINNAMON PO, Take 500 mg by mouth daily  , Disp: , Rfl:     Krill Oil 500 MG CAPS, Take 500 mg by mouth daily  , Disp: , Rfl:     lisinopril (ZESTRIL) 10 mg tablet, Take 10 mg by mouth every evening, Disp: , Rfl:     Magnesium 500 MG TABS, Take 500 mg by mouth daily  , Disp: , Rfl:     Omega-3 Fatty Acids (FISH OIL PO), Take 1,400 mg by mouth daily, Disp: , Rfl:     Probiotic Product (PROBIOTIC PO), Take 1 capsule by mouth daily, Disp: , Rfl:     Saw Palmetto, Serenoa repens, (SAW PALMETTO BERRIES PO), Take 1 tablet by mouth daily ResQ Prostate  , Disp: , Rfl:     tamsulosin (FLOMAX) 0 4 mg, Take 0 4 mg by mouth daily with dinner, Disp: , Rfl:     Turmeric Curcumin 500 MG CAPS, Take 500 mg by mouth daily  , Disp: , Rfl:         Slade Montalvo MD

## 2018-08-09 NOTE — LETTER
2018     Rajesh Billings MD  [address]    Patient: Ana Angelo   YOB: 1950   Date of Visit: 2018       Dear Dr Ranjit Singh: Thank you for referring Kirill Bush to me for evaluation  Below are my notes for this consultation  If you have questions, please do not hesitate to call me  I look forward to following your patient along with you  Sincerely,        Luisa Raymundo MD        CC: No Recipients  Luisa Raymundo MD  2018  9:32 AM  Sign at close encounter  [address] [facility]  [address], [address] [place] [address]  [phone]  www  [facility]  org      NAME: Ana Angelo  AGE: 80 y o  SEX: male  : 1950   MRN: [id number]    DATE: 2018  TIME: 9:16 AM    Assessment and Plan:  Bladder cancer:  No evidence recurrence 4 months after last TURBT and mitomycin  Follow-up 3 months with a cystoscopy  Elevated PSA, normalized:  Down to 3 1  Chief Complaint     Chief Complaint   Patient presents with    Cystoscopy       History of Present Illness   Here for follow-up of bladder cancer with 3 month surveillance cystoscopy status post TURBT 2018 of a posterior wall tumor and a neoplasm of low malignant potential of the left ureteral orifice  with mitomycin, and elevated PSA  His PSA is normalized to 3 1 as of 2018     It was 4 2 before  With regards to his bladder cancer, this is his second occurrence-the first occurrence was 2017 of a papillary tumor that was covering the left posterior wall and left ureteral orifice and left bladder neck  He had mitomycin instilled at that time and then underwent 6 weeks of BCG installation    Cystoscopy Procedure Note        Pre-operative Diagnosis:  Bladder cancer    Post-operative Diagnosis:     Procedure: Flexible cystoscopy    Surgeon: Altaf Smart MD    Anesthesia: 1% Xylocaine per urethra    EBL: Minimal    Complications: none    Procedure Details   The risks, benefits, complications, treatment options, and expected outcomes were discussed with the patient  The patient concurred with the proposed plan, giving informed consent  Cystoscopy was performed today under local anesthesia, using sterile technique  The patient was placed in the supine position, prepped with Betadine, and draped in the usual sterile fashion  The flexible cystocope was used to inspect both the urethra and bladder    Findings:  Urethra:  Normal without stricture  Prostate is moderately obstructive  Bladder:  Smooth, not trabeculated and there were no stones tumors or other lesions  The orifices were orthotopic and intact  The left ureteral orifice has been resected and is widely patent and there are no lesions  No recurrence of bladder tumors  Specimens:  None                 Complications:  None           Disposition: To home            Condition:  Stable          The following portions of the patient's history were reviewed and updated as appropriate: allergies, current medications, past family history, past medical history, past social history, past surgical history and problem list     Review of Systems   Review of Systems   Genitourinary: Negative  Active Problem List     Patient Active Problem List   Diagnosis    Malignant neoplasm of posterior wall of urinary bladder (HCC)       Objective   /80   Ht 5' 10" (1 778 m)   Wt 83 5 kg (184 lb)   BMI 26 40 kg/m²      Physical Exam   Constitutional: He is oriented to person, place, and time  He appears well-developed and well-nourished  HENT:   Head: Normocephalic and atraumatic  Eyes: EOM are normal    Neck: Normal range of motion  Pulmonary/Chest: Effort normal    Genitourinary: Penis normal    Musculoskeletal: Normal range of motion  Neurological: He is alert and oriented to person, place, and time     Skin: Skin is warm and dry    Psychiatric: He has a normal mood and affect   His behavior is normal  Judgment and thought content normal            Current Medications     Current Outpatient Prescriptions:     Alpha-Lipoic Acid 600 MG CAPS, Take 600 mg by mouth daily  , Disp: , Rfl:     bisoprolol-hydrochlorothiazide (ZIAC) 2 5-6 25 MG per tablet, Take 1 tablet by mouth daily, Disp: , Rfl:     cholecalciferol (VITAMIN D3) 1,000 units tablet, Take 1,000 Units by mouth daily, Disp: , Rfl:     CINNAMON PO, Take 500 mg by mouth daily  , Disp: , Rfl:     Krill Oil 500 MG CAPS, Take 500 mg by mouth daily  , Disp: , Rfl:     lisinopril (ZESTRIL) 10 mg tablet, Take 10 mg by mouth every evening, Disp: , Rfl:     Magnesium 500 MG TABS, Take 500 mg by mouth daily  , Disp: , Rfl:     Omega-3 Fatty Acids (FISH OIL PO), Take 1,400 mg by mouth daily, Disp: , Rfl:     Probiotic Product (PROBIOTIC PO), Take 1 capsule by mouth daily, Disp: , Rfl:     Saw Palmetto, Serenoa repens, (SAW PALMETTO BERRIES PO), Take 1 tablet by mouth daily ResQ Prostate  , Disp: , Rfl:     tamsulosin (FLOMAX) 0 4 mg, Take 0 4 mg by mouth daily with dinner, Disp: , Rfl:     Turmeric Curcumin 500 MG CAPS, Take 500 mg by mouth daily  , Disp: , Rfl:         Joellen Muñoz MD

## 2018-11-15 ENCOUNTER — TELEPHONE (OUTPATIENT)
Dept: UROLOGY | Facility: AMBULATORY SURGERY CENTER | Age: 68
End: 2018-11-15

## 2018-11-15 ENCOUNTER — PROCEDURE VISIT (OUTPATIENT)
Dept: UROLOGY | Facility: MEDICAL CENTER | Age: 68
End: 2018-11-15
Payer: MEDICARE

## 2018-11-15 VITALS
BODY MASS INDEX: 25.76 KG/M2 | DIASTOLIC BLOOD PRESSURE: 70 MMHG | SYSTOLIC BLOOD PRESSURE: 130 MMHG | HEIGHT: 71 IN | WEIGHT: 184 LBS

## 2018-11-15 DIAGNOSIS — N13.8 BPH WITH OBSTRUCTION/LOWER URINARY TRACT SYMPTOMS: ICD-10-CM

## 2018-11-15 DIAGNOSIS — R97.20 ELEVATED PSA: ICD-10-CM

## 2018-11-15 DIAGNOSIS — C67.4 MALIGNANT NEOPLASM OF POSTERIOR WALL OF URINARY BLADDER (HCC): Primary | ICD-10-CM

## 2018-11-15 DIAGNOSIS — N40.1 BPH WITH OBSTRUCTION/LOWER URINARY TRACT SYMPTOMS: ICD-10-CM

## 2018-11-15 LAB
SL AMB  POCT GLUCOSE, UA: ABNORMAL
SL AMB LEUKOCYTE ESTERASE,UA: ABNORMAL
SL AMB POCT BILIRUBIN,UA: ABNORMAL
SL AMB POCT BLOOD,UA: ABNORMAL
SL AMB POCT CLARITY,UA: CLEAR
SL AMB POCT COLOR,UA: YELLOW
SL AMB POCT KETONES,UA: ABNORMAL
SL AMB POCT NITRITE,UA: ABNORMAL
SL AMB POCT PH,UA: 5.5
SL AMB POCT SPECIFIC GRAVITY,UA: <=100.5
SL AMB POCT URINE PROTEIN: ABNORMAL
SL AMB POCT UROBILINOGEN: 0.2

## 2018-11-15 PROCEDURE — 99213 OFFICE O/P EST LOW 20 MIN: CPT | Performed by: UROLOGY

## 2018-11-15 PROCEDURE — 81003 URINALYSIS AUTO W/O SCOPE: CPT | Performed by: UROLOGY

## 2018-11-15 PROCEDURE — 52000 CYSTOURETHROSCOPY: CPT | Performed by: UROLOGY

## 2018-11-15 RX ORDER — SULFAMETHOXAZOLE AND TRIMETHOPRIM 800; 160 MG/1; MG/1
1 TABLET ORAL ONCE
Qty: 6 TABLET | Refills: 0 | Status: SHIPPED | OUTPATIENT
Start: 2018-11-15 | End: 2018-11-15

## 2018-11-15 RX ORDER — SULFAMETHOXAZOLE AND TRIMETHOPRIM 800; 160 MG/1; MG/1
1 TABLET ORAL ONCE
Qty: 6 TABLET | Refills: 0 | Status: SHIPPED | OUTPATIENT
Start: 2018-11-15 | End: 2018-11-15 | Stop reason: SDUPTHER

## 2018-11-15 NOTE — TELEPHONE ENCOUNTER
Pharmacy calling about patient's medication Dr Tereza Britton just prescribed  Please call pharmacy back

## 2018-11-15 NOTE — PROGRESS NOTES
[address] [facility]  [facility]  [address] [facility]  [facility], [address] [place] [address]  [phone]  www  [facility]  org      NAME: Chris Hernandez  AGE: 80 y o  SEX: male  : 1950   MRN: [id number]    DATE: 11/15/2018  TIME: 9:35 AM    Assessment and Plan:  Bladder cancer as below  No evidence of disease presently  Follow-up 3 months for another cystoscopy  We will check a PSA at that time for his history of elevated PSA as well  Continue with Flomax and saw palmetto  Bactrim was called into his pharmacy for him to take a dose around the time of his procedures  Chief Complaint   No chief complaint on file  History of Present Illness   Bladder cancer:  Status post TURBT 2018 of a posterior wall tumor and neoplasm of low malignant potential of the left ureteral orifice  It was given mitomycin at that time  His first occurrence was 2017 of a papillary tumor that was covering the left posterior wall and left ureteral orifice and left bladder neck and he had mitomycin at that time and underwent BCG instillation  Today's urinalysis shows small blood  Elevated PSA:  Normalized at 3 1 as of 2018  BPH with obstruction:  Continues to do well with Flomax and saw palmetto  Cystoscopy  Date/Time: 11/15/2018 9:37 AM  Performed by: Dago Harris  Authorized by: Dago Harris     Procedure details: cystoscopy    Additional Procedure Details: Cystoscopy Procedure Note        Pre-operative Diagnosis:  Bladder cancer, surveillance    Post-operative Diagnosis:  Same, no recurrence     Procedure: Flexible cystoscopy    Surgeon: Dipesh Subramanian MD    Anesthesia: 1% Xylocaine per urethra    EBL: Minimal    Complications: none    Procedure Details   The risks, benefits, complications, treatment options, and expected outcomes were discussed with the patient  The patient concurred with the proposed plan, giving informed consent      Cystoscopy was performed today under local anesthesia, using sterile technique  The patient was placed in the supine position, prepped with Betadine, and draped in the usual sterile fashion  The flexible cystocope was used to inspect both the urethra and bladder    Findings:  Urethra:  Normal without stricture  Moderate prostatic obstruction  Bladder:  Smooth, not trabeculated and there were no stones tumors or other lesions  The orifices were orthotopic and intact  No recurrence whatsoever    Left ureteral orifice in particular was normal            Specimens:  None                 Complications:  None           Disposition: To home            Condition:  Stable          The following portions of the patient's history were reviewed and updated as appropriate: allergies, current medications, past family history, past medical history, past social history, past surgical history and problem list     Review of Systems   Review of Systems    Active Problem List     Patient Active Problem List   Diagnosis    Malignant neoplasm of posterior wall of urinary bladder (HCC)       Objective   /70 (BP Location: Left arm, Patient Position: Sitting)   Ht 5' 11" (1 803 m)   Wt 83 5 kg (184 lb)   BMI 25 66 kg/m²     Physical Exam        Current Medications     Current Outpatient Prescriptions:     Alpha-Lipoic Acid 600 MG CAPS, Take 600 mg by mouth daily  , Disp: , Rfl:     cholecalciferol (VITAMIN D3) 1,000 units tablet, Take 1,000 Units by mouth daily, Disp: , Rfl:     CINNAMON PO, Take 500 mg by mouth daily  , Disp: , Rfl:     Krill Oil 500 MG CAPS, Take 500 mg by mouth daily  , Disp: , Rfl:     lisinopril (ZESTRIL) 10 mg tablet, Take 10 mg by mouth every evening, Disp: , Rfl:     Magnesium 500 MG TABS, Take 500 mg by mouth daily  , Disp: , Rfl:     Omega-3 Fatty Acids (FISH OIL PO), Take 1,400 mg by mouth daily, Disp: , Rfl:     Saw Palmetto, Serenoa repens, (SAW PALMETTO BERRIES PO), Take 1 tablet by mouth daily ResQ Prostate  , Disp: , Rfl:     tamsulosin (FLOMAX) 0 4 mg, Take 0 4 mg by mouth daily with dinner, Disp: , Rfl:     Turmeric Curcumin 500 MG CAPS, Take 500 mg by mouth daily  , Disp: , Rfl:     bisoprolol-hydrochlorothiazide (ZIAC) 2 5-6 25 MG per tablet, Take 1 tablet by mouth daily, Disp: , Rfl:     Probiotic Product (PROBIOTIC PO), Take 1 capsule by mouth daily, Disp: , Rfl:         Abdirahman Thorpe MD

## 2018-11-15 NOTE — LETTER
November 15, 2018     Jus Daniels MD  [address]    Patient: Tamie Bhatti   YOB: 1950   Date of Visit: 11/15/2018       Dear Dr Lorraine Gooden: Thank you for referring Sadie Pierson to me for evaluation  Below are my notes for this consultation  If you have questions, please do not hesitate to call me  I look forward to following your patient along with you  Sincerely,        Shanique Faria MD        CC: No Recipients  Shanique Faria MD  11/15/2018 10:09 AM  Sign at close encounter  [address] [facility]  [address], [address] [facility]  [phone]  www  [facility]  org      NAME: Tamie Bhatti  AGE: 80 y o  SEX: male  : 1950   MRN: [id number]    DATE: 11/15/2018  TIME: 9:35 AM    Assessment and Plan:  Bladder cancer as below  No evidence of disease presently  Follow-up 3 months for another cystoscopy  We will check a PSA at that time for his history of elevated PSA as well  Continue with Flomax and saw palmetto  Bactrim was called into his pharmacy for him to take a dose around the time of his procedures  Chief Complaint   No chief complaint on file  History of Present Illness   Bladder cancer:  Status post TURBT 2018 of a posterior wall tumor and neoplasm of low malignant potential of the left ureteral orifice  It was given mitomycin at that time  His first occurrence was 2017 of a papillary tumor that was covering the left posterior wall and left ureteral orifice and left bladder neck and he had mitomycin at that time and underwent BCG instillation  Today's urinalysis shows small blood  Elevated PSA:  Normalized at 3 1 as of 2018  BPH with obstruction:  Continues to do well with Flomax and saw palmetto    Cystoscopy  Date/Time: 11/15/2018 9:37 AM  Performed by: Vonita Nyhan by: Melo Banegas     Procedure details: cystoscopy    Additional Procedure Details: Cystoscopy Procedure Note        Pre-operative Diagnosis:  Bladder cancer, surveillance    Post-operative Diagnosis:  Same, no recurrence     Procedure: Flexible cystoscopy    Surgeon: Brianna Lubin MD    Anesthesia: 1% Xylocaine per urethra    EBL: Minimal    Complications: none    Procedure Details   The risks, benefits, complications, treatment options, and expected outcomes were discussed with the patient  The patient concurred with the proposed plan, giving informed consent  Cystoscopy was performed today under local anesthesia, using sterile technique  The patient was placed in the supine position, prepped with Betadine, and draped in the usual sterile fashion  The flexible cystocope was used to inspect both the urethra and bladder    Findings:  Urethra:  Normal without stricture  Moderate prostatic obstruction  Bladder:  Smooth, not trabeculated and there were no stones tumors or other lesions  The orifices were orthotopic and intact  No recurrence whatsoever    Left ureteral orifice in particular was normal            Specimens:  None                 Complications:  None           Disposition: To home            Condition:  Stable          The following portions of the patient's history were reviewed and updated as appropriate: allergies, current medications, past family history, past medical history, past social history, past surgical history and problem list     Review of Systems   Review of Systems    Active Problem List     Patient Active Problem List   Diagnosis    Malignant neoplasm of posterior wall of urinary bladder (HCC)       Objective   /70 (BP Location: Left arm, Patient Position: Sitting)   Ht 5' 11" (1 803 m)   Wt 83 5 kg (184 lb)   BMI 25 66 kg/m²      Physical Exam        Current Medications     Current Outpatient Prescriptions:     Alpha-Lipoic Acid 600 MG CAPS, Take 600 mg by mouth daily  , Disp: , Rfl:     cholecalciferol (VITAMIN D3) 1,000 units tablet, Take 1,000 Units by mouth daily, Disp: , Rfl:     CINNAMON PO, Take 500 mg by mouth daily  , Disp: , Rfl:     Krill Oil 500 MG CAPS, Take 500 mg by mouth daily  , Disp: , Rfl:     lisinopril (ZESTRIL) 10 mg tablet, Take 10 mg by mouth every evening, Disp: , Rfl:     Magnesium 500 MG TABS, Take 500 mg by mouth daily  , Disp: , Rfl:     Omega-3 Fatty Acids (FISH OIL PO), Take 1,400 mg by mouth daily, Disp: , Rfl:     Saw Palmetto, Serenoa repens, (SAW PALMETTO BERRIES PO), Take 1 tablet by mouth daily ResQ Prostate  , Disp: , Rfl:     tamsulosin (FLOMAX) 0 4 mg, Take 0 4 mg by mouth daily with dinner, Disp: , Rfl:     Turmeric Curcumin 500 MG CAPS, Take 500 mg by mouth daily  , Disp: , Rfl:     bisoprolol-hydrochlorothiazide (ZIAC) 2 5-6 25 MG per tablet, Take 1 tablet by mouth daily, Disp: , Rfl:     Probiotic Product (PROBIOTIC PO), Take 1 capsule by mouth daily, Disp: , Rfl:         Inessa Fine MD

## 2018-11-15 NOTE — TELEPHONE ENCOUNTER
Pharmacy was concerned that the script was incorrect  Script was for Bactrim quantity 6 disp 1 tablet  Dr Keesha Hernandez would like patient to take one today after his cystoscopy and the others will be dispensed one at a time for future cystoscopies

## 2019-02-15 ENCOUNTER — PROCEDURE VISIT (OUTPATIENT)
Dept: UROLOGY | Facility: MEDICAL CENTER | Age: 69
End: 2019-02-15
Payer: MEDICARE

## 2019-02-15 VITALS
HEART RATE: 70 BPM | WEIGHT: 182 LBS | DIASTOLIC BLOOD PRESSURE: 82 MMHG | HEIGHT: 71 IN | BODY MASS INDEX: 25.48 KG/M2 | SYSTOLIC BLOOD PRESSURE: 144 MMHG

## 2019-02-15 DIAGNOSIS — C67.4 MALIGNANT NEOPLASM OF POSTERIOR WALL OF URINARY BLADDER (HCC): Primary | ICD-10-CM

## 2019-02-15 DIAGNOSIS — C67.1 MALIGNANT NEOPLASM OF DOME OF URINARY BLADDER (HCC): ICD-10-CM

## 2019-02-15 LAB
SL AMB  POCT GLUCOSE, UA: NEGATIVE
SL AMB LEUKOCYTE ESTERASE,UA: NEGATIVE
SL AMB POCT BILIRUBIN,UA: NEGATIVE
SL AMB POCT BLOOD,UA: ABNORMAL
SL AMB POCT CLARITY,UA: CLEAR
SL AMB POCT COLOR,UA: YELLOW
SL AMB POCT KETONES,UA: NEGATIVE
SL AMB POCT NITRITE,UA: NEGATIVE
SL AMB POCT PH,UA: 5.5
SL AMB POCT SPECIFIC GRAVITY,UA: 1.02
SL AMB POCT URINE PROTEIN: NEGATIVE
SL AMB POCT UROBILINOGEN: 0.2

## 2019-02-15 PROCEDURE — 81003 URINALYSIS AUTO W/O SCOPE: CPT | Performed by: UROLOGY

## 2019-02-15 PROCEDURE — 99214 OFFICE O/P EST MOD 30 MIN: CPT | Performed by: UROLOGY

## 2019-02-15 PROCEDURE — 52000 CYSTOURETHROSCOPY: CPT | Performed by: UROLOGY

## 2019-02-15 RX ORDER — SILDENAFIL 50 MG/1
50 TABLET, FILM COATED ORAL DAILY PRN
Refills: 3 | COMMUNITY
Start: 2018-12-20

## 2019-02-15 NOTE — PROGRESS NOTES
[address] [facility]  [facility]  [address] [facility]  [place], [address] [place] [address]  [phone]  www  [facility]  org      NAME: Rakesh Aquino  AGE: 80 y o  SEX: male  : 1950   MRN: [id number]    DATE: 2/15/2019  TIME: 8:37 AM    Assessment and Plan:    Bladder cancerwith recurrence anterior dome and 7:00 position of the bladder neck on the median lobe  I recommend trans urethral resection of bladder tumor with mitomycin installation in the risks of bleeding infection and need for open surgery been explained he gives informed consent  He will likely need BCG after this, which will be his second course  We will schedule for the operating room  Due to the fact that this is his third occurrence, we will re-stage him with CT scan of the abdomen and pelvis and obtain labs  Chief Complaint   No chief complaint on file  History of Present Illness   Bladder cancer:  Status post TURBT 2018 of a posterior wall tumor and neoplasm of low malignant potential left ureteral orifice  Given mitomycin at that time  He is here for surveillance cystoscopy  First occurrence of bladder cancer with 2017 of a papillary tumor that was covering the left posterior wall and left ureteral orifice and left bladder neck and he had mitomycin at that time and then underwent BCG adjuvantly  No new complaints      Cystoscopy  Date/Time: 2/15/2019 8:39 AM  Performed by: Noemi Cogan, MD  Authorized by: Noemi Cogan, MD     Procedure details: cystoscopy    Additional Procedure Details: Cystoscopy Procedure Note        Pre-operative Diagnosis:  Bladder cancer, for surveillance cystoscopy    Post-operative Diagnosis:  Same, with small recurrence anterior dome and 7:00 a m  Bladder neck    Procedure: Flexible cystoscopy    Surgeon: Tapan Schilling MD    Anesthesia: 1% Xylocaine per urethra    EBL: Minimal    Complications: none    Procedure Details   The risks, benefits, complications, treatment options, and expected outcomes were discussed with the patient  The patient concurred with the proposed plan, giving informed consent  Cystoscopy was performed today under local anesthesia, using sterile technique  The patient was placed in the supine position, prepped with Betadine, and draped in the usual sterile fashion  The flexible cystocope was used to inspect both the urethra and bladder    Findings:  Urethra:  Normal without stricture  Trilobar hypertrophy  There is a small papillary lesion on the median lobe at the 7 o'clock position  Bladder:  Smooth, not trabeculated and there were no stones   There is a 1 cm papillary tumor on the anterior dome  The orifices were orthotopic and intact  Specimens:  None                 Complications:  None           Disposition: To home            Condition:  Stable            The following portions of the patient's history were reviewed and updated as appropriate: allergies, current medications, past family history, past medical history, past social history, past surgical history and problem list     Review of Systems   Review of Systems    Active Problem List     Patient Active Problem List   Diagnosis    Malignant neoplasm of posterior wall of urinary bladder ([address] )       Objective   There were no vitals taken for this visit  Physical Exam   Constitutional: He is oriented to person, place, and time  He appears well-developed and well-nourished  HENT:   Head: Normocephalic and atraumatic  Eyes: EOM are normal    Neck: Normal range of motion  Cardiovascular: Normal rate, regular rhythm and normal heart sounds  Exam reveals no gallop and no friction rub  No murmur heard  Pulmonary/Chest: Effort normal and breath sounds normal  No stridor  No respiratory distress  He has no wheezes  He has no rales  He exhibits no tenderness  Abdominal: Soft  He exhibits no distension  There is no tenderness   There is no rebound and no guarding  Genitourinary: Penis normal    Musculoskeletal: Normal range of motion  Neurological: He is alert and oriented to person, place, and time  Skin: Skin is warm and dry  Psychiatric: He has a normal mood and affect   His behavior is normal  Judgment and thought content normal            Current Medications     Current Outpatient Medications:     Alpha-Lipoic Acid 600 MG CAPS, Take 600 mg by mouth daily  , Disp: , Rfl:     bisoprolol-hydrochlorothiazide (ZIAC) 2 5-6 25 MG per tablet, Take 1 tablet by mouth daily, Disp: , Rfl:     cholecalciferol (VITAMIN D3) 1,000 units tablet, Take 1,000 Units by mouth daily, Disp: , Rfl:     CINNAMON PO, Take 500 mg by mouth daily  , Disp: , Rfl:     Krill Oil 500 MG CAPS, Take 500 mg by mouth daily  , Disp: , Rfl:     lisinopril (ZESTRIL) 10 mg tablet, Take 10 mg by mouth every evening, Disp: , Rfl:     Magnesium 500 MG TABS, Take 500 mg by mouth daily  , Disp: , Rfl:     Omega-3 Fatty Acids (FISH OIL PO), Take 1,400 mg by mouth daily, Disp: , Rfl:     Probiotic Product (PROBIOTIC PO), Take 1 capsule by mouth daily, Disp: , Rfl:     Saw Palmetto, Serenoa repens, (SAW PALMETTO BERRIES PO), Take 1 tablet by mouth daily ResQ Prostate  , Disp: , Rfl:     tamsulosin (FLOMAX) 0 4 mg, Take 0 4 mg by mouth daily with dinner, Disp: , Rfl:     Turmeric Curcumin 500 MG CAPS, Take 500 mg by mouth daily  , Disp: , Rfl:         Wm Hernandez MD

## 2019-02-15 NOTE — LETTER
February 15, 2019     Govind Hauser MD  [address]    Patient: Court Khan   YOB: 1950   Date of Visit: 2/15/2019       Dear Dr Cecilia Humphrey: Thank you for referring Chong Chopra to me for evaluation  Below are my notes for this consultation  If you have questions, please do not hesitate to call me  I look forward to following your patient along with you  Sincerely,        Sharri Nguyen MD        CC: No Recipients  Sharri Nguyen MD  2/15/2019  9:26 AM  Sign at close encounter  [address] [facility]  [address], [address] [place] [address]  [phone]  www  [facility]  org      NAME: Court Khan  AGE: 80 y o  SEX: male  : 1950   MRN: [id number]    DATE: 2/15/2019  TIME: 8:37 AM    Assessment and Plan:    Bladder cancerwith recurrence anterior dome and 7:00 position of the bladder neck on the median lobe  I recommend trans urethral resection of bladder tumor with mitomycin installation in the risks of bleeding infection and need for open surgery been explained he gives informed consent  He will likely need BCG after this, which will be his second course  We will schedule for the operating room  Due to the fact that this is his third occurrence, we will re-stage him with CT scan of the abdomen and pelvis and obtain labs  Chief Complaint   No chief complaint on file  History of Present Illness   Bladder cancer:  Status post TURBT 2018 of a posterior wall tumor and neoplasm of low malignant potential left ureteral orifice  Given mitomycin at that time  He is here for surveillance cystoscopy  First occurrence of bladder cancer with 2017 of a papillary tumor that was covering the left posterior wall and left ureteral orifice and left bladder neck and he had mitomycin at that time and then underwent BCG adjuvantly    No new complaints  Cystoscopy  Date/Time: 2/15/2019 8:39 AM  Performed by: Abdirahman Thorpe MD  Authorized by: Abdirahman Thorpe MD     Procedure details: cystoscopy    Additional Procedure Details: Cystoscopy Procedure Note        Pre-operative Diagnosis:  Bladder cancer, for surveillance cystoscopy    Post-operative Diagnosis:  Same, with small recurrence anterior dome and 7:00 a m  Bladder neck    Procedure: Flexible cystoscopy    Surgeon: Odilia Bell MD    Anesthesia: 1% Xylocaine per urethra    EBL: Minimal    Complications: none    Procedure Details   The risks, benefits, complications, treatment options, and expected outcomes were discussed with the patient  The patient concurred with the proposed plan, giving informed consent  Cystoscopy was performed today under local anesthesia, using sterile technique  The patient was placed in the supine position, prepped with Betadine, and draped in the usual sterile fashion  The flexible cystocope was used to inspect both the urethra and bladder    Findings:  Urethra:  Normal without stricture  Trilobar hypertrophy  There is a small papillary lesion on the median lobe at the 7 o'clock position  Bladder:  Smooth, not trabeculated and there were no stones   There is a 1 cm papillary tumor on the anterior dome  The orifices were orthotopic and intact  Specimens:  None                 Complications:  None           Disposition: To home            Condition:  Stable            The following portions of the patient's history were reviewed and updated as appropriate: allergies, current medications, past family history, past medical history, past social history, past surgical history and problem list     Review of Systems   Review of Systems    Active Problem List     Patient Active Problem List   Diagnosis    Malignant neoplasm of posterior wall of urinary bladder ([address] )       Objective   There were no vitals taken for this visit      Physical Exam   Constitutional: He is oriented to person, place, and time  He appears well-developed and well-nourished  HENT:   Head: Normocephalic and atraumatic  Eyes: EOM are normal    Neck: Normal range of motion  Cardiovascular: Normal rate, regular rhythm and normal heart sounds  Exam reveals no gallop and no friction rub  No murmur heard  Pulmonary/Chest: Effort normal and breath sounds normal  No stridor  No respiratory distress  He has no wheezes  He has no rales  He exhibits no tenderness  Abdominal: Soft  He exhibits no distension  There is no tenderness  There is no rebound and no guarding  Genitourinary: Penis normal    Musculoskeletal: Normal range of motion  Neurological: He is alert and oriented to person, place, and time  Skin: Skin is warm and dry  Psychiatric: He has a normal mood and affect   His behavior is normal  Judgment and thought content normal            Current Medications     Current Outpatient Medications:     Alpha-Lipoic Acid 600 MG CAPS, Take 600 mg by mouth daily  , Disp: , Rfl:     bisoprolol-hydrochlorothiazide (ZIAC) 2 5-6 25 MG per tablet, Take 1 tablet by mouth daily, Disp: , Rfl:     cholecalciferol (VITAMIN D3) 1,000 units tablet, Take 1,000 Units by mouth daily, Disp: , Rfl:     CINNAMON PO, Take 500 mg by mouth daily  , Disp: , Rfl:     Krill Oil 500 MG CAPS, Take 500 mg by mouth daily  , Disp: , Rfl:     lisinopril (ZESTRIL) 10 mg tablet, Take 10 mg by mouth every evening, Disp: , Rfl:     Magnesium 500 MG TABS, Take 500 mg by mouth daily  , Disp: , Rfl:     Omega-3 Fatty Acids (FISH OIL PO), Take 1,400 mg by mouth daily, Disp: , Rfl:     Probiotic Product (PROBIOTIC PO), Take 1 capsule by mouth daily, Disp: , Rfl:     Saw Palmetto, Serenoa repens, (SAW PALMETTO BERRIES PO), Take 1 tablet by mouth daily ResQ Prostate  , Disp: , Rfl:     tamsulosin (FLOMAX) 0 4 mg, Take 0 4 mg by mouth daily with dinner, Disp: , Rfl:     Turmeric Curcumin 500 MG CAPS, Take 500 mg by mouth daily  , Disp: , Rfl:         Delphine Chavez MD

## 2019-02-15 NOTE — H&P
[address] [facility]  [facility]  [address] [facility]  [place], [address] [place] [address]  [phone]  www  [facility]  org      NAME: Julienne Martin  AGE: 80 y o  SEX: male  : 1950   MRN: [id number]    DATE: 2/15/2019  TIME: 8:37 AM    Assessment and Plan:    Bladder cancerwith recurrence anterior dome and 7:00 position of the bladder neck on the median lobe  I recommend trans urethral resection of bladder tumor with mitomycin installation in the risks of bleeding infection and need for open surgery been explained he gives informed consent  He will likely need BCG after this, which will be his second course  We will schedule for the operating room  Due to the fact that this is his third occurrence, we will re-stage him with CT scan of the abdomen and pelvis and obtain labs  Chief Complaint   No chief complaint on file  History of Present Illness   Bladder cancer:  Status post TURBT 2018 of a posterior wall tumor and neoplasm of low malignant potential left ureteral orifice  Given mitomycin at that time  He is here for surveillance cystoscopy  First occurrence of bladder cancer with 2017 of a papillary tumor that was covering the left posterior wall and left ureteral orifice and left bladder neck and he had mitomycin at that time and then underwent BCG adjuvantly  No new complaints      Cystoscopy  Date/Time: 2/15/2019 8:39 AM  Performed by: Kathy Bowles MD  Authorized by: Kathy Bowles MD     Procedure details: cystoscopy    Additional Procedure Details: Cystoscopy Procedure Note        Pre-operative Diagnosis:  Bladder cancer, for surveillance cystoscopy    Post-operative Diagnosis:  Same, with small recurrence anterior dome and 7:00 a m  Bladder neck    Procedure: Flexible cystoscopy    Surgeon: Onur Hutton MD    Anesthesia: 1% Xylocaine per urethra    EBL: Minimal    Complications: none    Procedure Details   The risks, benefits, complications, treatment options, and expected outcomes were discussed with the patient  The patient concurred with the proposed plan, giving informed consent  Cystoscopy was performed today under local anesthesia, using sterile technique  The patient was placed in the supine position, prepped with Betadine, and draped in the usual sterile fashion  The flexible cystocope was used to inspect both the urethra and bladder    Findings:  Urethra:  Normal without stricture  Trilobar hypertrophy  There is a small papillary lesion on the median lobe at the 7 o'clock position  Bladder:  Smooth, not trabeculated and there were no stones   There is a 1 cm papillary tumor on the anterior dome  The orifices were orthotopic and intact  Specimens:  None                 Complications:  None           Disposition: To home            Condition:  Stable            The following portions of the patient's history were reviewed and updated as appropriate: allergies, current medications, past family history, past medical history, past social history, past surgical history and problem list     Review of Systems   Review of Systems    Active Problem List     Patient Active Problem List   Diagnosis    Malignant neoplasm of posterior wall of urinary bladder ([address] )       Objective   There were no vitals taken for this visit  Physical Exam   Constitutional: He is oriented to person, place, and time  He appears well-developed and well-nourished  HENT:   Head: Normocephalic and atraumatic  Eyes: EOM are normal    Neck: Normal range of motion  Cardiovascular: Normal rate, regular rhythm and normal heart sounds  Exam reveals no gallop and no friction rub  No murmur heard  Pulmonary/Chest: Effort normal and breath sounds normal  No stridor  No respiratory distress  He has no wheezes  He has no rales  He exhibits no tenderness  Abdominal: Soft  He exhibits no distension  There is no tenderness   There is no rebound and no guarding  Genitourinary: Penis normal    Musculoskeletal: Normal range of motion  Neurological: He is alert and oriented to person, place, and time  Skin: Skin is warm and dry  Psychiatric: He has a normal mood and affect   His behavior is normal  Judgment and thought content normal            Current Medications     Current Outpatient Medications:     Alpha-Lipoic Acid 600 MG CAPS, Take 600 mg by mouth daily  , Disp: , Rfl:     bisoprolol-hydrochlorothiazide (ZIAC) 2 5-6 25 MG per tablet, Take 1 tablet by mouth daily, Disp: , Rfl:     cholecalciferol (VITAMIN D3) 1,000 units tablet, Take 1,000 Units by mouth daily, Disp: , Rfl:     CINNAMON PO, Take 500 mg by mouth daily  , Disp: , Rfl:     Krill Oil 500 MG CAPS, Take 500 mg by mouth daily  , Disp: , Rfl:     lisinopril (ZESTRIL) 10 mg tablet, Take 10 mg by mouth every evening, Disp: , Rfl:     Magnesium 500 MG TABS, Take 500 mg by mouth daily  , Disp: , Rfl:     Omega-3 Fatty Acids (FISH OIL PO), Take 1,400 mg by mouth daily, Disp: , Rfl:     Probiotic Product (PROBIOTIC PO), Take 1 capsule by mouth daily, Disp: , Rfl:     Saw Palmetto, Serenoa repens, (SAW PALMETTO BERRIES PO), Take 1 tablet by mouth daily ResQ Prostate  , Disp: , Rfl:     tamsulosin (FLOMAX) 0 4 mg, Take 0 4 mg by mouth daily with dinner, Disp: , Rfl:     Turmeric Curcumin 500 MG CAPS, Take 500 mg by mouth daily  , Disp: , Rfl:         Rosi Martinez MD

## 2019-02-15 NOTE — H&P (VIEW-ONLY)
[address] [facility]  [facility]  [address] [facility]  [place], [address] [place] [address]  [phone]  www  [facility]  org      NAME: Robby Larry  AGE: 80 y o  SEX: male  : 1950   MRN: [id number]    DATE: 2/15/2019  TIME: 8:37 AM    Assessment and Plan:    Bladder cancerwith recurrence anterior dome and 7:00 position of the bladder neck on the median lobe  I recommend trans urethral resection of bladder tumor with mitomycin installation in the risks of bleeding infection and need for open surgery been explained he gives informed consent  He will likely need BCG after this, which will be his second course  We will schedule for the operating room  Due to the fact that this is his third occurrence, we will re-stage him with CT scan of the abdomen and pelvis and obtain labs  Chief Complaint   No chief complaint on file  History of Present Illness   Bladder cancer:  Status post TURBT 2018 of a posterior wall tumor and neoplasm of low malignant potential left ureteral orifice  Given mitomycin at that time  He is here for surveillance cystoscopy  First occurrence of bladder cancer with 2017 of a papillary tumor that was covering the left posterior wall and left ureteral orifice and left bladder neck and he had mitomycin at that time and then underwent BCG adjuvantly  No new complaints      Cystoscopy  Date/Time: 2/15/2019 8:39 AM  Performed by: Yoana Merrill MD  Authorized by: Yoana Merrill MD     Procedure details: cystoscopy    Additional Procedure Details: Cystoscopy Procedure Note        Pre-operative Diagnosis:  Bladder cancer, for surveillance cystoscopy    Post-operative Diagnosis:  Same, with small recurrence anterior dome and 7:00 a m  Bladder neck    Procedure: Flexible cystoscopy    Surgeon: Florentin Castillo MD    Anesthesia: 1% Xylocaine per urethra    EBL: Minimal    Complications: none    Procedure Details   The risks, benefits, complications, treatment options, and expected outcomes were discussed with the patient  The patient concurred with the proposed plan, giving informed consent  Cystoscopy was performed today under local anesthesia, using sterile technique  The patient was placed in the supine position, prepped with Betadine, and draped in the usual sterile fashion  The flexible cystocope was used to inspect both the urethra and bladder    Findings:  Urethra:  Normal without stricture  Trilobar hypertrophy  There is a small papillary lesion on the median lobe at the 7 o'clock position  Bladder:  Smooth, not trabeculated and there were no stones   There is a 1 cm papillary tumor on the anterior dome  The orifices were orthotopic and intact  Specimens:  None                 Complications:  None           Disposition: To home            Condition:  Stable            The following portions of the patient's history were reviewed and updated as appropriate: allergies, current medications, past family history, past medical history, past social history, past surgical history and problem list     Review of Systems   Review of Systems    Active Problem List     Patient Active Problem List   Diagnosis    Malignant neoplasm of posterior wall of urinary bladder ([address] )       Objective   There were no vitals taken for this visit  Physical Exam   Constitutional: He is oriented to person, place, and time  He appears well-developed and well-nourished  HENT:   Head: Normocephalic and atraumatic  Eyes: EOM are normal    Neck: Normal range of motion  Cardiovascular: Normal rate, regular rhythm and normal heart sounds  Exam reveals no gallop and no friction rub  No murmur heard  Pulmonary/Chest: Effort normal and breath sounds normal  No stridor  No respiratory distress  He has no wheezes  He has no rales  He exhibits no tenderness  Abdominal: Soft  He exhibits no distension  There is no tenderness   There is no rebound and no guarding  Genitourinary: Penis normal    Musculoskeletal: Normal range of motion  Neurological: He is alert and oriented to person, place, and time  Skin: Skin is warm and dry  Psychiatric: He has a normal mood and affect   His behavior is normal  Judgment and thought content normal            Current Medications     Current Outpatient Medications:     Alpha-Lipoic Acid 600 MG CAPS, Take 600 mg by mouth daily  , Disp: , Rfl:     bisoprolol-hydrochlorothiazide (ZIAC) 2 5-6 25 MG per tablet, Take 1 tablet by mouth daily, Disp: , Rfl:     cholecalciferol (VITAMIN D3) 1,000 units tablet, Take 1,000 Units by mouth daily, Disp: , Rfl:     CINNAMON PO, Take 500 mg by mouth daily  , Disp: , Rfl:     Krill Oil 500 MG CAPS, Take 500 mg by mouth daily  , Disp: , Rfl:     lisinopril (ZESTRIL) 10 mg tablet, Take 10 mg by mouth every evening, Disp: , Rfl:     Magnesium 500 MG TABS, Take 500 mg by mouth daily  , Disp: , Rfl:     Omega-3 Fatty Acids (FISH OIL PO), Take 1,400 mg by mouth daily, Disp: , Rfl:     Probiotic Product (PROBIOTIC PO), Take 1 capsule by mouth daily, Disp: , Rfl:     Saw Palmetto, Serenoa repens, (SAW PALMETTO BERRIES PO), Take 1 tablet by mouth daily ResQ Prostate  , Disp: , Rfl:     tamsulosin (FLOMAX) 0 4 mg, Take 0 4 mg by mouth daily with dinner, Disp: , Rfl:     Turmeric Curcumin 500 MG CAPS, Take 500 mg by mouth daily  , Disp: , Rfl:         Porter Ramirez MD

## 2019-02-15 NOTE — LETTER
February 15, 2019     Franklyn Nino MD  [address]    Patient: Julienne Martin   YOB: 1950   Date of Visit: 2/15/2019       Dear Dr Cobian Blow: Thank you for referring Ritika Ruiz to me for evaluation  Below are my notes for this consultation  If you have questions, please do not hesitate to call me  I look forward to following your patient along with you  Sincerely,        Kathy Bowles MD        CC: No Recipients  Kathy Bowles MD  2/15/2019  8:39 AM  Sign at close encounter  [address] [facility]  [address], [address] [place] [address]  [phone]  www  [facility]  org      NAME: Julienne Martin  AGE: 80 y o  SEX: male  : 1950   MRN: [id number]    DATE: 2/15/2019  TIME: 8:37 AM    Assessment and Plan               Chief Complaint   No chief complaint on file  History of Present Illness   Bladder cancer:  Status post TURBT 2018 of a posterior wall tumor and neoplasm of low malignant potential left ureteral orifice  Given mitomycin at that time  He is here for surveillance cystoscopy  First occurrence of bladder cancer with 2017 of a papillary tumor that was covering the left posterior wall and left ureteral orifice and left bladder neck and he had mitomycin at that time and then underwent BCG adjuvantly      Cystoscopy  Date/Time: 2/15/2019 8:39 AM  Performed by: Kathy Bowles MD  Authorized by: Kathy Bowles MD     Procedure details: cystoscopy    Additional Procedure Details: Cystoscopy Procedure Note        Pre-operative Diagnosis:  Bladder cancer, for surveillance cystoscopy    Post-operative Diagnosis:  Same    Procedure: Flexible cystoscopy    Surgeon: Ounr Hutton MD    Anesthesia: 1% Xylocaine per urethra    EBL: Minimal    Complications: none    Procedure Details   The risks, benefits, complications, treatment options, and expected outcomes were discussed with the patient  The patient concurred with the proposed plan, giving informed consent  Cystoscopy was performed today under local anesthesia, using sterile technique  The patient was placed in the supine position, prepped with Betadine, and draped in the usual sterile fashion  The flexible cystocope was used to inspect both the urethra and bladder    Findings:  Urethra:    Bladder:  Smooth, not trabeculated and there were no stones tumors or other lesions  The orifices were orthotopic and intact  Specimens:  None                 Complications:  None           Disposition: To home            Condition:  Stable            The following portions of the patient's history were reviewed and updated as appropriate: allergies, current medications, past family history, past medical history, past social history, past surgical history and problem list     Review of Systems   Review of Systems    Active Problem List     Patient Active Problem List   Diagnosis    Malignant neoplasm of posterior wall of urinary bladder ([facility] [address] )       Objective   There were no vitals taken for this visit      Physical Exam        Current Medications     Current Outpatient Medications:     Alpha-Lipoic Acid 600 MG CAPS, Take 600 mg by mouth daily  , Disp: , Rfl:     bisoprolol-hydrochlorothiazide (ZIAC) 2 5-6 25 MG per tablet, Take 1 tablet by mouth daily, Disp: , Rfl:     cholecalciferol (VITAMIN D3) 1,000 units tablet, Take 1,000 Units by mouth daily, Disp: , Rfl:     CINNAMON PO, Take 500 mg by mouth daily  , Disp: , Rfl:     Krill Oil 500 MG CAPS, Take 500 mg by mouth daily  , Disp: , Rfl:     lisinopril (ZESTRIL) 10 mg tablet, Take 10 mg by mouth every evening, Disp: , Rfl:     Magnesium 500 MG TABS, Take 500 mg by mouth daily  , Disp: , Rfl:     Omega-3 Fatty Acids (FISH OIL PO), Take 1,400 mg by mouth daily, Disp: , Rfl:     Probiotic Product (PROBIOTIC PO), Take 1 capsule by mouth daily, Disp: , Rfl:     Saw Palmetto, Serenoa repens, (SAW PALMETTO BERRIES PO), Take 1 tablet by mouth daily ResQ Prostate  , Disp: , Rfl:     tamsulosin (FLOMAX) 0 4 mg, Take 0 4 mg by mouth daily with dinner, Disp: , Rfl:     Turmeric Curcumin 500 MG CAPS, Take 500 mg by mouth daily  , Disp: , Rfl:         Antonio Oliva MD

## 2019-02-18 ENCOUNTER — APPOINTMENT (OUTPATIENT)
Dept: LAB | Age: 69
End: 2019-02-18
Payer: MEDICARE

## 2019-02-18 DIAGNOSIS — C67.1 MALIGNANT NEOPLASM OF DOME OF URINARY BLADDER (HCC): ICD-10-CM

## 2019-02-18 LAB
ALBUMIN SERPL BCP-MCNC: 3.9 G/DL (ref 3.5–5)
ALP SERPL-CCNC: 45 U/L (ref 46–116)
ALT SERPL W P-5'-P-CCNC: 24 U/L (ref 12–78)
ANION GAP SERPL CALCULATED.3IONS-SCNC: 5 MMOL/L (ref 4–13)
AST SERPL W P-5'-P-CCNC: 14 U/L (ref 5–45)
BILIRUB SERPL-MCNC: 1.14 MG/DL (ref 0.2–1)
BUN SERPL-MCNC: 23 MG/DL (ref 5–25)
CALCIUM SERPL-MCNC: 8.9 MG/DL (ref 8.3–10.1)
CHLORIDE SERPL-SCNC: 107 MMOL/L (ref 100–108)
CO2 SERPL-SCNC: 28 MMOL/L (ref 21–32)
CREAT SERPL-MCNC: 1.1 MG/DL (ref 0.6–1.3)
ERYTHROCYTE [DISTWIDTH] IN BLOOD BY AUTOMATED COUNT: 12.8 % (ref 11.6–15.1)
GFR SERPL CREATININE-BSD FRML MDRD: 69 ML/MIN/1.73SQ M
GLUCOSE P FAST SERPL-MCNC: 101 MG/DL (ref 65–99)
HCT VFR BLD AUTO: 41.5 % (ref 36.5–49.3)
HGB BLD-MCNC: 13.2 G/DL (ref 12–17)
MCH RBC QN AUTO: 29.3 PG (ref 26.8–34.3)
MCHC RBC AUTO-ENTMCNC: 31.8 G/DL (ref 31.4–37.4)
MCV RBC AUTO: 92 FL (ref 82–98)
PLATELET # BLD AUTO: 202 THOUSANDS/UL (ref 149–390)
PMV BLD AUTO: 10.5 FL (ref 8.9–12.7)
POTASSIUM SERPL-SCNC: 4.3 MMOL/L (ref 3.5–5.3)
PROT SERPL-MCNC: 7 G/DL (ref 6.4–8.2)
RBC # BLD AUTO: 4.5 MILLION/UL (ref 3.88–5.62)
SODIUM SERPL-SCNC: 140 MMOL/L (ref 136–145)
WBC # BLD AUTO: 4.49 THOUSAND/UL (ref 4.31–10.16)

## 2019-02-18 PROCEDURE — 80053 COMPREHEN METABOLIC PANEL: CPT

## 2019-02-18 PROCEDURE — 36415 COLL VENOUS BLD VENIPUNCTURE: CPT

## 2019-02-18 PROCEDURE — 85027 COMPLETE CBC AUTOMATED: CPT

## 2019-02-18 RX ORDER — CALCIUM CARBONATE/VITAMIN D3 600 MG-10
1 TABLET ORAL DAILY
COMMUNITY

## 2019-02-18 NOTE — PRE-PROCEDURE INSTRUCTIONS
Pre-Surgery Instructions:   Medication Instructions    Alpha-Lipoic Acid 600 MG CAPS Instructed patient per Anesthesia Guidelines   B Complex Vitamins (B COMPLEX 50 PO) Instructed patient per Anesthesia Guidelines   bisoprolol-hydrochlorothiazide (ZIAC) 2 5-6 25 MG per tablet Instructed patient per Anesthesia Guidelines   cholecalciferol (VITAMIN D3) 1,000 units tablet Instructed patient per Anesthesia Guidelines   CINNAMON PO Instructed patient per Anesthesia Guidelines   Krill Oil 500 MG CAPS Instructed patient per Anesthesia Guidelines   lisinopril (ZESTRIL) 10 mg tablet Instructed patient per Anesthesia Guidelines   Magnesium 500 MG TABS Instructed patient per Anesthesia Guidelines   Omega 3 1200 MG CAPS Instructed patient per Anesthesia Guidelines   Probiotic Product (PROBIOTIC PO) Patient was instructed to contact Physician for medication instruction   Saw Palmetto, Serenoa repens, (SAW PALMETTO BERRY PO) Instructed patient per Anesthesia Guidelines   sildenafil (VIAGRA) 50 MG tablet Instructed patient per Anesthesia Guidelines   tamsulosin (FLOMAX) 0 4 mg Instructed patient per Anesthesia Guidelines   Turmeric Curcumin 500 MG CAPS Instructed patient per Anesthesia Guidelines  Instructed has no medications to be taken morning of surgery  Stop Aspirin, NSAIDs, vitamins, and supplements 1 week before surgery

## 2019-02-22 ENCOUNTER — HOSPITAL ENCOUNTER (OUTPATIENT)
Dept: CT IMAGING | Facility: HOSPITAL | Age: 69
Discharge: HOME | End: 2019-02-22
Attending: UROLOGY
Payer: MEDICARE

## 2019-02-22 DIAGNOSIS — C67.1 MALIGNANT NEOPLASM OF DOME OF URINARY BLADDER (HCC): ICD-10-CM

## 2019-02-22 PROCEDURE — 74178 CT ABD&PLV WO CNTR FLWD CNTR: CPT

## 2019-02-22 RX ADMIN — IOHEXOL 100 ML: 350 INJECTION, SOLUTION INTRAVENOUS at 16:48

## 2019-02-26 ENCOUNTER — ANESTHESIA EVENT (OUTPATIENT)
Dept: PERIOP | Facility: HOSPITAL | Age: 69
End: 2019-02-26
Payer: MEDICARE

## 2019-02-27 ENCOUNTER — HOSPITAL ENCOUNTER (OUTPATIENT)
Facility: HOSPITAL | Age: 69
Setting detail: OUTPATIENT SURGERY
Discharge: HOME | End: 2019-02-27
Attending: UROLOGY | Admitting: UROLOGY
Payer: MEDICARE

## 2019-02-27 ENCOUNTER — ANESTHESIA (OUTPATIENT)
Dept: PERIOP | Facility: HOSPITAL | Age: 69
End: 2019-02-27
Payer: MEDICARE

## 2019-02-27 ENCOUNTER — TELEPHONE (OUTPATIENT)
Dept: UROLOGY | Facility: MEDICAL CENTER | Age: 69
End: 2019-02-27

## 2019-02-27 VITALS
DIASTOLIC BLOOD PRESSURE: 62 MMHG | SYSTOLIC BLOOD PRESSURE: 117 MMHG | TEMPERATURE: 98.2 F | BODY MASS INDEX: 25.48 KG/M2 | OXYGEN SATURATION: 97 % | HEIGHT: 71 IN | HEART RATE: 61 BPM | WEIGHT: 182 LBS | RESPIRATION RATE: 18 BRPM

## 2019-02-27 DIAGNOSIS — C67.1 MALIGNANT NEOPLASM OF DOME OF URINARY BLADDER (HCC): ICD-10-CM

## 2019-02-27 PROCEDURE — 52234 CYSTOSCOPY AND TREATMENT: CPT | Performed by: UROLOGY

## 2019-02-27 PROCEDURE — 88307 TISSUE EXAM BY PATHOLOGIST: CPT | Performed by: PATHOLOGY

## 2019-02-27 RX ORDER — SODIUM CHLORIDE 9 MG/ML
125 INJECTION, SOLUTION INTRAVENOUS CONTINUOUS
Status: DISCONTINUED | OUTPATIENT
Start: 2019-02-27 | End: 2019-02-27 | Stop reason: HOSPADM

## 2019-02-27 RX ORDER — FENTANYL CITRATE 50 UG/ML
INJECTION, SOLUTION INTRAMUSCULAR; INTRAVENOUS AS NEEDED
Status: DISCONTINUED | OUTPATIENT
Start: 2019-02-27 | End: 2019-02-27 | Stop reason: SURG

## 2019-02-27 RX ORDER — FENTANYL CITRATE/PF 50 MCG/ML
50 SYRINGE (ML) INJECTION
Status: DISCONTINUED | OUTPATIENT
Start: 2019-02-27 | End: 2019-02-27 | Stop reason: HOSPADM

## 2019-02-27 RX ORDER — HYDROCODONE BITARTRATE AND ACETAMINOPHEN 5; 325 MG/1; MG/1
1 TABLET ORAL EVERY 6 HOURS PRN
Status: DISCONTINUED | OUTPATIENT
Start: 2019-02-27 | End: 2019-02-27 | Stop reason: HOSPADM

## 2019-02-27 RX ORDER — ONDANSETRON 2 MG/ML
4 INJECTION INTRAMUSCULAR; INTRAVENOUS ONCE AS NEEDED
Status: DISCONTINUED | OUTPATIENT
Start: 2019-02-27 | End: 2019-02-27 | Stop reason: HOSPADM

## 2019-02-27 RX ORDER — MIDAZOLAM HYDROCHLORIDE 1 MG/ML
INJECTION INTRAMUSCULAR; INTRAVENOUS AS NEEDED
Status: DISCONTINUED | OUTPATIENT
Start: 2019-02-27 | End: 2019-02-27 | Stop reason: SURG

## 2019-02-27 RX ORDER — ONDANSETRON 2 MG/ML
INJECTION INTRAMUSCULAR; INTRAVENOUS AS NEEDED
Status: DISCONTINUED | OUTPATIENT
Start: 2019-02-27 | End: 2019-02-27 | Stop reason: SURG

## 2019-02-27 RX ORDER — PROPOFOL 10 MG/ML
INJECTION, EMULSION INTRAVENOUS AS NEEDED
Status: DISCONTINUED | OUTPATIENT
Start: 2019-02-27 | End: 2019-02-27 | Stop reason: SURG

## 2019-02-27 RX ORDER — PHENAZOPYRIDINE HYDROCHLORIDE 200 MG/1
200 TABLET, FILM COATED ORAL ONCE
Status: COMPLETED | OUTPATIENT
Start: 2019-02-27 | End: 2019-02-27

## 2019-02-27 RX ORDER — CEPHALEXIN 500 MG/1
500 CAPSULE ORAL EVERY 6 HOURS SCHEDULED
Qty: 12 CAPSULE | Refills: 0 | Status: SHIPPED | OUTPATIENT
Start: 2019-02-27 | End: 2019-03-02

## 2019-02-27 RX ORDER — SORBITOL 30 G/1000ML
IRRIGANT IRRIGATION AS NEEDED
Status: DISCONTINUED | OUTPATIENT
Start: 2019-02-27 | End: 2019-02-27 | Stop reason: HOSPADM

## 2019-02-27 RX ORDER — HYDROCODONE BITARTRATE AND ACETAMINOPHEN 5; 325 MG/1; MG/1
1-2 TABLET ORAL EVERY 6 HOURS PRN
Qty: 10 TABLET | Refills: 0 | Status: SHIPPED | OUTPATIENT
Start: 2019-02-27 | End: 2019-07-26 | Stop reason: ALTCHOICE

## 2019-02-27 RX ORDER — AMOXICILLIN 500 MG/1
500 TABLET, FILM COATED ORAL DAILY PRN
COMMUNITY

## 2019-02-27 RX ORDER — KETOROLAC TROMETHAMINE 30 MG/ML
INJECTION, SOLUTION INTRAMUSCULAR; INTRAVENOUS AS NEEDED
Status: DISCONTINUED | OUTPATIENT
Start: 2019-02-27 | End: 2019-02-27 | Stop reason: SURG

## 2019-02-27 RX ADMIN — MIDAZOLAM 2 MG: 1 INJECTION INTRAMUSCULAR; INTRAVENOUS at 08:04

## 2019-02-27 RX ADMIN — SODIUM CHLORIDE 125 ML/HR: 0.9 INJECTION, SOLUTION INTRAVENOUS at 09:25

## 2019-02-27 RX ADMIN — ONDANSETRON 4 MG: 2 INJECTION INTRAMUSCULAR; INTRAVENOUS at 08:30

## 2019-02-27 RX ADMIN — KETOROLAC TROMETHAMINE 30 MG: 30 INJECTION, SOLUTION INTRAMUSCULAR at 08:34

## 2019-02-27 RX ADMIN — DEXAMETHASONE SODIUM PHOSPHATE 4 MG: 10 INJECTION INTRAMUSCULAR; INTRAVENOUS at 08:10

## 2019-02-27 RX ADMIN — SODIUM CHLORIDE 125 ML/HR: 0.9 INJECTION, SOLUTION INTRAVENOUS at 07:43

## 2019-02-27 RX ADMIN — FENTANYL CITRATE 25 MCG: 50 INJECTION, SOLUTION INTRAMUSCULAR; INTRAVENOUS at 08:13

## 2019-02-27 RX ADMIN — FENTANYL CITRATE 25 MCG: 50 INJECTION, SOLUTION INTRAMUSCULAR; INTRAVENOUS at 08:17

## 2019-02-27 RX ADMIN — PHENAZOPYRIDINE HYDROCHLORIDE 200 MG: 200 TABLET ORAL at 10:14

## 2019-02-27 RX ADMIN — PROPOFOL 200 MG: 10 INJECTION, EMULSION INTRAVENOUS at 08:08

## 2019-02-27 RX ADMIN — Medication 2000 MG: at 08:10

## 2019-02-27 RX ADMIN — FENTANYL CITRATE 50 MCG: 50 INJECTION, SOLUTION INTRAMUSCULAR; INTRAVENOUS at 08:25

## 2019-02-27 NOTE — TELEPHONE ENCOUNTER
----- Message from Sherryle Alice, MD sent at 2/27/2019  8:57 AM EST -----  Please call patient with appointment to see me in 3 weeks

## 2019-02-27 NOTE — OP NOTE
OPERATIVE REPORT  PATIENT NAME: Brina Cardenas    :  1950  MRN: [id number]  Pt Location: [facility] OR ROOM 04    SURGERY DATE: 2019    Surgeon(s) and Role:     * Tamie Schroeder MD - Primary    Preop Diagnosis:  Malignant neoplasm of dome of urinary bladder ([facility] [address] ) [C67 1], bladder neck    Post-Op Diagnosis Codes:    Malignant neoplasm, bladder cancer of bladder neck    Procedure(s) (LRB):  TRANSURETHRAL RESECTION OF BLADDER TUMOR (TURBT) (N/A)  INSTILLATION MITOMYCIN (N/A)  CYSTOSCOPY (N/A)    Specimen(s):  ID Type Source Tests Collected by Time Destination   1 : bladder neck tumor Tissue Urinary Bladder TISSUE EXAM Tamie Schroeder MD 2019 [id number]        Estimated Blood Loss:   Minimal    Drains:  Urethral Catheter Latex 18 Fr  (Active)   Site Assessment Clean;Skin intact 2019  8:50 AM   Collection Container Standard drainage bag 2019  8:50 AM   Number of days: 0       Anesthesia Type:   General/LMA    Operative Indications:  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) [C67 1]  Bladder cancer recurrence, bladder neck    Operative Findings:  No tumor found on the dome  Papillary tumor found, recurrence, 7:00 position bladder neck  Complications:   None    Procedure and Technique:  The patient is a 81-year-old man with a history recurrent bladder cancer-on surveillance cystoscopy in the office I saw small papillary tumor the dome of the bladder and a small papillary tumor of the bladder neck  Offered him cystoscopy, trans urethral resection of these bladder tumors and mitomycin installation  The risks of bleeding infection and damage to the bladder were explained he gives informed consent  The patient was brought to the operating room and identified properly  LMA was induced the patient was prepped and draped in dorsal lithotomy position in the usual fashion  A time-out was performed and cystoscopy was carried out with a 22 [place] cystoscopy sheath and 30 degree lens    A papillary tumor seen in the 7 o'clock position of the bladder neck inside the bladder itself, and this was small, perhaps 5 mm  I performed panendoscopy with 30 and 70 degree lenses several times and found no other tumors  There were stellate scars where previous TURBT had been performed  The ureteral orifices were orthotopic and intact  Using the 24 Citizen of [place] resectoscope sheath and 30 degree lens, I performed a deep resection of the intravesical prostate and bladder tumor, removing the tumor intact without cautery artifact and getting good margins  This was fulgurated with the electrocautery and hemostasis was excellent  I reinspected to make sure that I did not miss any dome tumor and I could not see any other tumor whatsoever  When I was satisfied with this I drained the bladder and I placed a 16 [place] Church catheter and placed 40 mg of mitomycin into the bladder  The Church catheter was clamped and the patient was transferred to recovery room in good condition with this in place       I was present for the entire procedure and A qualified resident physician was not available    Patient Disposition:  PACU  and extubated and stable    SIGNATURE: Chano Infante MD  DATE: February 27, 2019  TIME: 8:54 AM

## 2019-02-27 NOTE — TELEPHONE ENCOUNTER
Patient called wanted to know if he should have a 2 week or 3 week follow up appointment  Advised patient of previous message and he said once resolved if someone can call him  Patient does not mind 2 or 3 week post op appointment  Please advise

## 2019-02-27 NOTE — DISCHARGE INSTRUCTIONS
Call for fever greater than 101 5°, inability urinate or severe pain not relieved by pain medications  Expect to see blood in the urine now and for the next couple of weeks  Expect to have burning urgency and frequency of urination   you prescriptions for the pharmacy  My office will call you with an appointment to see me in 3 weeks to go over the pathology  Only 1 tumor was found at the bladder neck  It was small and was completely resected  Cephalexin (By mouth)   Cephalexin (lht-v-KQV-in)  Treats infections  This medicine is a cephalosporin antibiotic  Brand Name(s): Daxbia, Keflex   There may be other brand names for this medicine  When This Medicine Should Not Be Used: This medicine is not right for everyone  Do not use this medicine if you had an allergic reaction to cephalexin or another cephalosporin medicine  How to Use This Medicine:   Capsule, Tablet, Tablet for Suspension, Liquid  · Your doctor will tell you how much medicine to use  Do not use more than directed  · Read and follow the patient instructions that come with this medicine  Talk to your doctor or pharmacist if you have any questions  · You may take your medicine with food or milk to avoid stomach upset  · Oral liquid: Shake well just before use  Measure the oral liquid medicine with a marked measuring spoon, oral syringe, or medicine cup  · Take all of the medicine in your prescription to clear up your infection, even if you feel better after the first few doses  · Missed dose: Take a dose as soon as you remember  If it is almost time for your next dose, wait until then and take a regular dose  Do not take extra medicine to make up for a missed dose  · Capsule or tablet: Store at room temperature away from heat, moisture, and direct light  · Oral liquid: Store in the refrigerator for 14 days  After 14 days, throw away any unused medicine  Do not freeze    Drugs and Foods to Avoid:   Ask your doctor or pharmacist before using any other medicine, including over-the-counter medicines, vitamins, and herbal products  · Some medicines and foods can affect how cephalexin works  Tell your doctor if you are also using  ¨ Metformin  ¨ Probenecid  Warnings While Using This Medicine:   · Tell your doctor if you are pregnant or breastfeeding, or if you have kidney disease, liver disease, or a history of digestive problems, such as colitis  Tell your doctor if you are allergic to penicillin  · This medicine can cause diarrhea  Call your doctor if the diarrhea becomes severe, does not stop, or is bloody  Do not take any medicine to stop diarrhea until you have talked to your doctor  Diarrhea can occur 2 months or more after you stop taking this medicine  · Tell any doctor or dentist who treats you that you are using this medicine  This medicine may affect certain medical test results  · Call your doctor if your symptoms do not improve or if they get worse  · Keep all medicine out of the reach of children  Never share your medicine with anyone  Possible Side Effects While Using This Medicine:   Call your doctor right away if you notice any of these side effects:  · Allergic reaction: Itching or hives, swelling in your face or hands, swelling or tingling in your mouth or throat, chest tightness, trouble breathing  · Blistering, peeling, red skin rash  · Severe diarrhea, especially if bloody or ongoing  · Severe stomach pain, vomiting  If you notice these less serious side effects, talk with your doctor:   · Mild diarrhea or nausea  If you notice other side effects that you think are caused by this medicine, tell your doctor  Call your doctor for medical advice about side effects  You may report side effects to FDA at 4-261-FDA-7851  © 2017 [address] Information is for End User's use only and may not be sold, redistributed or otherwise used for commercial purposes    The above information is an educational aid only  It is not intended as medical advice for individual conditions or treatments  Talk to your doctor, nurse or pharmacist before following any medical regimen to see if it is safe and effective for you  Hydrocodone/Acetaminophen (By mouth)   Acetaminophen (q-rdxm-m-MIN-oh-fen), Hydrocodone Bitartrate (krk-suim-XCG-done bye-TAR-trate)  Treats pain  This medicine contains a narcotic pain reliever  Brand Name(s): Hycet, Lorcet, Lorcet HD, Lorcet Plus, Lortab 10/325, Lortab 5/325, Lortab 7 5/325, Lortab Elixir, Norco, Verdrocet, Vicodin, Vicodin ES, Vicodin HP, Xodol, Xodol 5/300   There may be other brand names for this medicine  When This Medicine Should Not Be Used: This medicine is not right for everyone  Do not use it if you had an allergic reaction to acetaminophen, hydrocodone, or other narcotic medicines, or stomach or bowel blockage (including paralytic ileus)  How to Use This Medicine:   Capsule, Liquid, Tablet  · Your doctor will tell you how much medicine to use  Do not use more than directed  · An overdose can be dangerous  Follow directions carefully so you do not get too much medicine at one time  · Oral liquid: Measure the oral liquid medicine with a marked measuring spoon, oral syringe, or medicine cup  · Drink plenty of liquids to help avoid constipation  · This medicine should come with a Medication Guide  Ask your pharmacist for a copy if you do not have one  · Missed dose: Take a dose as soon as you remember  If it is almost time for your next dose, wait until then and take a regular dose  Do not take extra medicine to make up for a missed dose  · Store the medicine in a closed container at room temperature, away from heat, moisture, and direct light  Flush any unused Norco® tablets down the toilet  Drugs and Foods to Avoid:   Ask your doctor or pharmacist before using any other medicine, including over-the-counter medicines, vitamins, and herbal products    · Do not use this medicine if you are using or have used an MAO inhibitor within the past 14 days  · Some medicines can affect how hydrocodone/acetaminophen works  Tell your doctor if you are using any of the following:   ¨ Carbamazepine, erythromycin, ketoconazole, mirtazapine, phenytoin, rifampin, ritonavir, tramadol, trazodone  ¨ Diuretic (water pill)  ¨ Medicine to treat depression or mental health problems  ¨ Medicine to treat migraine headaches  ¨ Phenothiazine medicine  · Tell your doctor if you use anything else that makes you sleepy  Some examples are allergy medicine, narcotic pain medicine, and alcohol  Tell your doctor if you are using buprenorphine, butorphanol, nalbuphine, pentazocine, or a muscle relaxer  · Do not drink alcohol while you are using this medicine  Acetaminophen can damage your liver, and your risk is higher if you also drink alcohol  Warnings While Using This Medicine:   · Tell your doctor if you are pregnant or breastfeeding, or if you have kidney disease, liver disease, lung or breathing problems, gallbladder or pancreas problems, an underactive thyroid, [place] disease, prostate problems, trouble urinating, stomach problems, or a history of head injury or brain tumor, seizures, alcohol or drug addiction  · This medicine may cause the following problems:   ¨ High risk of overdose, which can lead to death  ¨ Respiratory depression (serious breathing problem that can be life-threatening)  ¨ Liver problems  ¨ Serious skin reactions  ¨ Serotonin syndrome (when used with certain medicines)  · This medicine can be habit-forming  Do not use more than your prescribed dose  Call your doctor if you think your medicine is not working  · This medicine may make you dizzy or drowsy  Do not drive or doing anything else that could be dangerous until you know how this medicine affects you  · This medicine contains acetaminophen   Read the labels of all other medicines you are using to see if they also contain acetaminophen, or ask your doctor or pharmacist  Seanus Jacksonbobbi not use more than 4 grams (4,000 milligrams) total of acetaminophen in one day  · Tell any doctor or dentist who treats you that you are using this medicine  This medicine may affect certain medical test results  · This medicine may cause constipation, especially with long-term use  Ask your doctor if you should use a laxative to prevent and treat constipation  · This medicine could cause infertility  Talk with your doctor before using this medicine if you plan to have children  · Keep all medicine out of the reach of children  Never share your medicine with anyone  Possible Side Effects While Using This Medicine:   Call your doctor right away if you notice any of these side effects:  · Allergic reaction: Itching or hives, swelling in your face or hands, swelling or tingling in your mouth or throat, chest tightness, trouble breathing  · Anxiety, restlessness, fast heartbeat, fever, sweating, muscle spasms, twitching, diarrhea, seeing or hearing things that are not there  · Blistering, peeling, red skin rash  · Blue lips, fingernails, or skin  · Dark urine or pale stools, loss of appetite, nausea or vomiting, stomach pain, yellow skin or eyes  · Extreme weakness, shallow breathing, slow heartbeat, sweating, seizures, cold or clammy skin  · Lightheadedness, dizziness, fainting  If you notice these less serious side effects, talk with your doctor:   · Constipation, nausea, vomiting  · Tiredness or sleepiness  If you notice other side effects that you think are caused by this medicine, tell your doctor  Call your doctor for medical advice about side effects  You may report side effects to FDA at 5-800-FDA-9744  © 2017 [address] Information is for End User's use only and may not be sold, redistributed or otherwise used for commercial purposes  The above information is an  only   It is not intended as medical advice for individual conditions or treatments  Talk to your doctor, nurse or pharmacist before following any medical regimen to see if it is safe and effective for you

## 2019-02-27 NOTE — ANESTHESIA PREPROCEDURE EVALUATION
Review of Systems/Medical History          Cardiovascular  Hyperlipidemia, Hypertension controlled,    Pulmonary  Negative pulmonary ROS        GI/Hepatic  Negative GI/hepatic ROS          Negative  ROS Genitourinary malignancy Bladder cancer,        Endo/Other     GYN       Hematology  Negative hematology ROS      Musculoskeletal    Arthritis     Neurology  Negative neurology ROS      Psychology   Negative psychology ROS              Physical Exam    Airway    Mallampati score: I  TM Distance: >3 FB  Neck ROM: full     Dental   No notable dental hx     Cardiovascular  Rhythm: regular, Rate: normal, Cardiovascular exam normal    Pulmonary  Pulmonary exam normal Breath sounds clear to auscultation,     Other Findings        Anesthesia Plan  ASA Score- 2     Anesthesia Type- general with ASA Monitors  Additional Monitors:   Airway Plan: LMA  Plan Factors- Patient instructed to abstain from smoking on day of procedure  Patient did not smoke on day of surgery  Induction- intravenous  Postoperative Plan- Plan for postoperative opioid use  Informed Consent- Anesthetic plan and risks discussed with patient and spouse

## 2019-02-27 NOTE — ANESTHESIA POSTPROCEDURE EVALUATION
Post-Op Assessment Note    CV Status:  Stable    Pain management: adequate     Mental Status:  Alert and awake   Hydration Status:  Euvolemic   PONV Controlled:  Controlled   Airway Patency:  Patent   Post Op Vitals Reviewed: Yes      Staff: Anesthesiologist           /59 (02/27/19 0923)    Temp 98 2 °F (36 8 °C) (02/27/19 0923)    Pulse 62 (02/27/19 0923)   Resp (!) 24 (02/27/19 0923)    SpO2 97 % (02/27/19 0923)

## 2019-03-19 ENCOUNTER — OFFICE VISIT (OUTPATIENT)
Dept: UROLOGY | Facility: MEDICAL CENTER | Age: 69
End: 2019-03-19
Payer: MEDICARE

## 2019-03-19 VITALS
HEART RATE: 60 BPM | SYSTOLIC BLOOD PRESSURE: 130 MMHG | HEIGHT: 71 IN | DIASTOLIC BLOOD PRESSURE: 76 MMHG | WEIGHT: 189 LBS | BODY MASS INDEX: 26.46 KG/M2

## 2019-03-19 DIAGNOSIS — C67.4 MALIGNANT NEOPLASM OF POSTERIOR WALL OF URINARY BLADDER (HCC): Primary | ICD-10-CM

## 2019-03-19 LAB
SL AMB  POCT GLUCOSE, UA: NEGATIVE
SL AMB LEUKOCYTE ESTERASE,UA: ABNORMAL
SL AMB POCT BILIRUBIN,UA: NEGATIVE
SL AMB POCT BLOOD,UA: ABNORMAL
SL AMB POCT CLARITY,UA: CLEAR
SL AMB POCT COLOR,UA: YELLOW
SL AMB POCT KETONES,UA: NEGATIVE
SL AMB POCT NITRITE,UA: NEGATIVE
SL AMB POCT PH,UA: 5
SL AMB POCT SPECIFIC GRAVITY,UA: 1.02
SL AMB POCT URINE PROTEIN: NEGATIVE
SL AMB POCT UROBILINOGEN: 0.2

## 2019-03-19 PROCEDURE — 99213 OFFICE O/P EST LOW 20 MIN: CPT | Performed by: UROLOGY

## 2019-03-19 PROCEDURE — 81003 URINALYSIS AUTO W/O SCOPE: CPT | Performed by: UROLOGY

## 2019-03-19 NOTE — PROGRESS NOTES
[address] [facility]  [facility]  [address] [facility]  [place], [address] [place] [address]  [phone]  www  [facility]  org      NAME: Sandra Alba  AGE: 80 y o  SEX: male  : 1950   MRN: [id number]    DATE: 3/19/2019  TIME: 9:36 AM    Assessment and Plan:    Bladder cancer, low-grade noninvasive-this is second recurrence  He has had 1 course of adjuvant BCG  I recommended another, he agrees  We will do 6 weeks of BCG starting about 4 weeks and then he will see me for cystoscopy in 3 months after that  Chief Complaint   No chief complaint on file  History of Present Illness   Bladder cancer:  Status post TURBT of a recurrence of bladder cancer 2019-there is papillary tumor found at 7:00 position on the bladder neck  He underwent mitomycin installation as well  I did not find any papillary tumor on the dome of the bladder as I had seen in the office cystoscopy  Pathology 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 and that was uninvolved  His last TURBT was 2018 of a posterior wall tumor and a tumor of low malignant potential at the left ureteral orifice  He was given mitomycin at that time  The first episode he had a bladder cancer was 2017 of a papillary tumor that was covering the left posterior wall and left ureteral orifice and left bladder neck and he had mitomycin at that time and then underwent BCG treatment  The following portions of the patient's history were reviewed and updated as appropriate: allergies, current medications, past family history, past medical history, past social history, past surgical history and problem list     Review of Systems   Review of Systems   Genitourinary: Negative          Active Problem List     Patient Active Problem List   Diagnosis    Malignant neoplasm of posterior wall of urinary bladder (HCC)    Malignant neoplasm of dome of urinary bladder ([facility] [address] )       Objective   There were no vitals taken for this visit      Physical Exam        Current Medications     Current Outpatient Medications:     Alpha-Lipoic Acid 600 MG CAPS, Take 600 mg by mouth daily  , Disp: , Rfl:     amoxicillin (AMOXIL) 500 MG tablet, Take 500 mg by mouth daily as needed (one hour prior to dental appt), Disp: , Rfl:     B Complex Vitamins (B COMPLEX 50 PO), Take by mouth daily, Disp: , Rfl:     bisoprolol-hydrochlorothiazide (ZIAC) 2 5-6 25 MG per tablet, Take 1 tablet by mouth daily, Disp: , Rfl:     cholecalciferol (VITAMIN D3) 1,000 units tablet, Take 1,000 Units by mouth daily, Disp: , Rfl:     CINNAMON PO, Take 500 mg by mouth daily  , Disp: , Rfl:     HYDROcodone-acetaminophen (NORCO) 5-325 mg per tablet, Take 1-2 tablets by mouth every 6 (six) hours as needed for pain for up to 10 dosesMax Daily Amount: 8 tablets, Disp: 10 tablet, Rfl: 0    Krill Oil 500 MG CAPS, Take 500 mg by mouth daily  , Disp: , Rfl:     lisinopril (ZESTRIL) 10 mg tablet, Take 10 mg by mouth every evening, Disp: , Rfl:     Magnesium 500 MG TABS, Take 500 mg by mouth daily  , Disp: , Rfl:     Omega 3 1200 MG CAPS, Take 1 capsule by mouth daily, Disp: , Rfl:     Probiotic Product (PROBIOTIC PO), Take 1 capsule by mouth daily, Disp: , Rfl:     Saw Palmetto, Serenoa repens, (SAW PALMETTO BERRY PO), Take by mouth daily, Disp: , Rfl:     sildenafil (VIAGRA) 50 MG tablet, 50 mg daily as needed, Disp: , Rfl: 3    tamsulosin (FLOMAX) 0 4 mg, Take 0 4 mg by mouth daily with dinner, Disp: , Rfl:     Turmeric Curcumin 500 MG CAPS, Take 500 mg by mouth daily  , Disp: , Rfl:         Laurel Rico MD

## 2019-03-26 ENCOUNTER — TELEPHONE (OUTPATIENT)
Dept: UROLOGY | Facility: MEDICAL CENTER | Age: 69
End: 2019-03-26

## 2019-03-26 DIAGNOSIS — N30.90 CYSTITIS: Primary | ICD-10-CM

## 2019-03-26 NOTE — TELEPHONE ENCOUNTER
Called pt and scheduled all BCG instillations starting 4-24-19 in this office  U/C order in computer for 4-19-19  Instructions and consent sent to pt's home  Request sent to nurse to order BCG for this office

## 2019-03-26 NOTE — TELEPHONE ENCOUNTER
Provider: Retha Meigs    Patient calling to set up his BCG appointments  Patient states he was suppose to get a call last week but is still awaiting call    Please call patient back at [phone]

## 2019-04-19 ENCOUNTER — LAB (OUTPATIENT)
Dept: LAB | Age: 69
End: 2019-04-19
Payer: MEDICARE

## 2019-04-19 DIAGNOSIS — N30.90 CYSTITIS: ICD-10-CM

## 2019-04-19 PROCEDURE — 87086 URINE CULTURE/COLONY COUNT: CPT

## 2019-04-20 LAB — BACTERIA UR CULT: NORMAL

## 2019-04-24 ENCOUNTER — PROCEDURE VISIT (OUTPATIENT)
Dept: UROLOGY | Facility: MEDICAL CENTER | Age: 69
End: 2019-04-24
Payer: MEDICARE

## 2019-04-24 VITALS — TEMPERATURE: 97.6 F | BODY MASS INDEX: 26.46 KG/M2 | HEART RATE: 65 BPM | HEIGHT: 71 IN | WEIGHT: 189 LBS

## 2019-04-24 DIAGNOSIS — C67.4 MALIGNANT NEOPLASM OF POSTERIOR WALL OF URINARY BLADDER (HCC): Primary | ICD-10-CM

## 2019-04-24 LAB
SL AMB  POCT GLUCOSE, UA: NEGATIVE
SL AMB LEUKOCYTE ESTERASE,UA: NEGATIVE
SL AMB POCT BILIRUBIN,UA: NEGATIVE
SL AMB POCT BLOOD,UA: ABNORMAL
SL AMB POCT CLARITY,UA: CLEAR
SL AMB POCT COLOR,UA: ABNORMAL
SL AMB POCT KETONES,UA: NEGATIVE
SL AMB POCT NITRITE,UA: NEGATIVE
SL AMB POCT PH,UA: 5.5
SL AMB POCT SPECIFIC GRAVITY,UA: 1.02
SL AMB POCT URINE PROTEIN: ABNORMAL
SL AMB POCT UROBILINOGEN: ABNORMAL

## 2019-04-24 PROCEDURE — 81003 URINALYSIS AUTO W/O SCOPE: CPT | Performed by: UROLOGY

## 2019-04-24 PROCEDURE — 51720 TREATMENT OF BLADDER LESION: CPT | Performed by: UROLOGY

## 2019-05-01 ENCOUNTER — PROCEDURE VISIT (OUTPATIENT)
Dept: UROLOGY | Facility: MEDICAL CENTER | Age: 69
End: 2019-05-01
Payer: MEDICARE

## 2019-05-01 DIAGNOSIS — C67.4 MALIGNANT NEOPLASM OF POSTERIOR WALL OF URINARY BLADDER (HCC): Primary | ICD-10-CM

## 2019-05-01 LAB
SL AMB  POCT GLUCOSE, UA: NEGATIVE
SL AMB LEUKOCYTE ESTERASE,UA: NEGATIVE
SL AMB POCT BILIRUBIN,UA: NEGATIVE
SL AMB POCT BLOOD,UA: ABNORMAL
SL AMB POCT CLARITY,UA: CLEAR
SL AMB POCT COLOR,UA: ABNORMAL
SL AMB POCT KETONES,UA: NEGATIVE
SL AMB POCT NITRITE,UA: NEGATIVE
SL AMB POCT PH,UA: 6.5
SL AMB POCT SPECIFIC GRAVITY,UA: 1.02
SL AMB POCT URINE PROTEIN: ABNORMAL
SL AMB POCT UROBILINOGEN: ABNORMAL

## 2019-05-01 PROCEDURE — 81003 URINALYSIS AUTO W/O SCOPE: CPT

## 2019-05-01 PROCEDURE — 51720 TREATMENT OF BLADDER LESION: CPT

## 2019-05-08 ENCOUNTER — PROCEDURE VISIT (OUTPATIENT)
Dept: UROLOGY | Facility: MEDICAL CENTER | Age: 69
End: 2019-05-08
Payer: MEDICARE

## 2019-05-08 VITALS — WEIGHT: 189 LBS | BODY MASS INDEX: 26.46 KG/M2 | HEART RATE: 67 BPM | HEIGHT: 71 IN | TEMPERATURE: 97.7 F

## 2019-05-08 DIAGNOSIS — C67.4 MALIGNANT NEOPLASM OF POSTERIOR WALL OF URINARY BLADDER (HCC): Primary | ICD-10-CM

## 2019-05-08 LAB
SL AMB  POCT GLUCOSE, UA: NEGATIVE
SL AMB LEUKOCYTE ESTERASE,UA: NEGATIVE
SL AMB POCT BILIRUBIN,UA: NEGATIVE
SL AMB POCT BLOOD,UA: ABNORMAL
SL AMB POCT CLARITY,UA: CLEAR
SL AMB POCT COLOR,UA: ABNORMAL
SL AMB POCT KETONES,UA: NEGATIVE
SL AMB POCT NITRITE,UA: NEGATIVE
SL AMB POCT PH,UA: 5
SL AMB POCT SPECIFIC GRAVITY,UA: >=1.03
SL AMB POCT URINE PROTEIN: ABNORMAL
SL AMB POCT UROBILINOGEN: ABNORMAL

## 2019-05-08 PROCEDURE — 51720 TREATMENT OF BLADDER LESION: CPT

## 2019-05-08 PROCEDURE — 81003 URINALYSIS AUTO W/O SCOPE: CPT

## 2019-05-15 ENCOUNTER — PROCEDURE VISIT (OUTPATIENT)
Dept: UROLOGY | Facility: MEDICAL CENTER | Age: 69
End: 2019-05-15
Payer: MEDICARE

## 2019-05-15 VITALS — HEIGHT: 71 IN | HEART RATE: 63 BPM | TEMPERATURE: 97.7 F | WEIGHT: 189 LBS | BODY MASS INDEX: 26.46 KG/M2

## 2019-05-15 DIAGNOSIS — C67.4 MALIGNANT NEOPLASM OF POSTERIOR WALL OF URINARY BLADDER (HCC): ICD-10-CM

## 2019-05-15 DIAGNOSIS — N30.90 CYSTITIS: Primary | ICD-10-CM

## 2019-05-15 LAB
SL AMB  POCT GLUCOSE, UA: NEGATIVE
SL AMB LEUKOCYTE ESTERASE,UA: NEGATIVE
SL AMB POCT BILIRUBIN,UA: NEGATIVE
SL AMB POCT BLOOD,UA: NORMAL
SL AMB POCT CLARITY,UA: CLEAR
SL AMB POCT COLOR,UA: NEGATIVE
SL AMB POCT KETONES,UA: NEGATIVE
SL AMB POCT NITRITE,UA: NEGATIVE
SL AMB POCT PH,UA: 5.5
SL AMB POCT SPECIFIC GRAVITY,UA: 1.02
SL AMB POCT URINE PROTEIN: NEGATIVE
SL AMB POCT UROBILINOGEN: NORMAL

## 2019-05-15 PROCEDURE — 51720 TREATMENT OF BLADDER LESION: CPT

## 2019-05-15 PROCEDURE — 81003 URINALYSIS AUTO W/O SCOPE: CPT

## 2019-05-22 ENCOUNTER — PROCEDURE VISIT (OUTPATIENT)
Dept: UROLOGY | Facility: MEDICAL CENTER | Age: 69
End: 2019-05-22
Payer: MEDICARE

## 2019-05-22 VITALS — WEIGHT: 189 LBS | TEMPERATURE: 97.4 F | HEART RATE: 60 BPM | HEIGHT: 71 IN | BODY MASS INDEX: 26.46 KG/M2

## 2019-05-22 DIAGNOSIS — C67.4 MALIGNANT NEOPLASM OF POSTERIOR WALL OF URINARY BLADDER (HCC): Primary | ICD-10-CM

## 2019-05-22 LAB
HGB UR QL STRIP.AUTO: ABNORMAL
SL AMB  POCT GLUCOSE, UA: NEGATIVE
SL AMB LEUKOCYTE ESTERASE,UA: NEGATIVE
SL AMB POCT BILIRUBIN,UA: NEGATIVE
SL AMB POCT CLARITY,UA: CLEAR
SL AMB POCT COLOR,UA: ABNORMAL
SL AMB POCT KETONES,UA: NEGATIVE
SL AMB POCT NITRITE,UA: NEGATIVE
SL AMB POCT PH,UA: 5.5
SL AMB POCT SPECIFIC GRAVITY,UA: 1.02
SL AMB POCT URINE PROTEIN: NEGATIVE
SL AMB POCT UROBILINOGEN: ABNORMAL

## 2019-05-22 PROCEDURE — 81003 URINALYSIS AUTO W/O SCOPE: CPT | Performed by: UROLOGY

## 2019-05-22 PROCEDURE — 51720 TREATMENT OF BLADDER LESION: CPT

## 2019-05-29 ENCOUNTER — PROCEDURE VISIT (OUTPATIENT)
Dept: UROLOGY | Facility: MEDICAL CENTER | Age: 69
End: 2019-05-29
Payer: MEDICARE

## 2019-05-29 DIAGNOSIS — C67.4 MALIGNANT NEOPLASM OF POSTERIOR WALL OF URINARY BLADDER (HCC): Primary | ICD-10-CM

## 2019-05-29 LAB
SL AMB  POCT GLUCOSE, UA: NEGATIVE
SL AMB LEUKOCYTE ESTERASE,UA: ABNORMAL
SL AMB POCT BILIRUBIN,UA: NEGATIVE
SL AMB POCT BLOOD,UA: ABNORMAL
SL AMB POCT CLARITY,UA: CLEAR
SL AMB POCT COLOR,UA: ABNORMAL
SL AMB POCT KETONES,UA: NEGATIVE
SL AMB POCT NITRITE,UA: NEGATIVE
SL AMB POCT PH,UA: 5.5
SL AMB POCT SPECIFIC GRAVITY,UA: 1.02
SL AMB POCT URINE PROTEIN: ABNORMAL
SL AMB POCT UROBILINOGEN: ABNORMAL

## 2019-05-29 PROCEDURE — 51720 TREATMENT OF BLADDER LESION: CPT

## 2019-05-29 PROCEDURE — 81003 URINALYSIS AUTO W/O SCOPE: CPT

## 2019-07-26 ENCOUNTER — PROCEDURE VISIT (OUTPATIENT)
Dept: UROLOGY | Facility: MEDICAL CENTER | Age: 69
End: 2019-07-26
Payer: MEDICARE

## 2019-07-26 VITALS — BODY MASS INDEX: 25.06 KG/M2 | WEIGHT: 179 LBS | HEIGHT: 71 IN

## 2019-07-26 DIAGNOSIS — Z12.5 ENCOUNTER FOR PROSTATE CANCER SCREENING: ICD-10-CM

## 2019-07-26 DIAGNOSIS — C67.4 MALIGNANT NEOPLASM OF POSTERIOR WALL OF URINARY BLADDER (HCC): Primary | ICD-10-CM

## 2019-07-26 LAB
SL AMB  POCT GLUCOSE, UA: ABNORMAL
SL AMB LEUKOCYTE ESTERASE,UA: ABNORMAL
SL AMB POCT BILIRUBIN,UA: ABNORMAL
SL AMB POCT BLOOD,UA: ABNORMAL
SL AMB POCT CLARITY,UA: CLEAR
SL AMB POCT COLOR,UA: YELLOW
SL AMB POCT KETONES,UA: ABNORMAL
SL AMB POCT NITRITE,UA: ABNORMAL
SL AMB POCT PH,UA: 5.5
SL AMB POCT SPECIFIC GRAVITY,UA: 1.02
SL AMB POCT URINE PROTEIN: ABNORMAL
SL AMB POCT UROBILINOGEN: 0.2

## 2019-07-26 PROCEDURE — 99213 OFFICE O/P EST LOW 20 MIN: CPT | Performed by: UROLOGY

## 2019-07-26 PROCEDURE — 52000 CYSTOURETHROSCOPY: CPT | Performed by: UROLOGY

## 2019-07-26 PROCEDURE — 81003 URINALYSIS AUTO W/O SCOPE: CPT | Performed by: UROLOGY

## 2019-07-26 NOTE — LETTER
2019     Jasmin Fish MD  [address]    Patient: Vinh Born   YOB: 1950   Date of Visit: 2019       Dear Dr Rodriguez Boehringer: Thank you for referring Monse Lopez to me for evaluation  Below are my notes for this consultation  If you have questions, please do not hesitate to call me  I look forward to following your patient along with you  Sincerely,        Bentley Herring MD        CC: No Recipients  Bentley Herring MD  2019  1:59 PM  Sign at close encounter  [address] [facility]  [address], [address] [facility]  [phone]  www  [facility]  org      NAME: Vinh Born  AGE: 77 y o  SEX: male  : 1950   MRN: [id number]    DATE: 2019  TIME: 8:52 AM    Assessment and Plan:    Bladder cancer:  No recurrence  Follow-up 3 months for repeat cystoscopy  Encounter for prostate cancer screening:  Check PSA in a few weeks, send him the results  Chief Complaint   No chief complaint on file  History of Present Illness   Bladder cancer:  Status post TURBT of recurrence of bladder cancer 2019-there is a papillary tumor found on 7 o'clock position on the bladder neck  He underwent and mitomycin installation as well  Pathology 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 and that was uninvolved  Previous TURBT was 2018 with a posterior wall tumor and a tumor of low malignant potential the left ureteral orifice  He underwent 6 weeks of BCG since I last saw him  Here for surveillance cystoscopy      Cystoscopy  Date/Time: 2019 8:53 AM  Performed by: Bentley Herring MD  Authorized by: Bentley Herring MD     Procedure details: cystoscopy    Additional Procedure Details: Cystoscopy Procedure Note        Pre-operative Diagnosis:  Bladder cancer surveillance    Post-operative Diagnosis:  Same, no recurrence    Procedure: Flexible cystoscopy    Surgeon: Layla Blackubrn MD    Anesthesia: 1% Xylocaine per urethra    EBL: Minimal    Complications: none    Procedure Details   The risks, benefits, complications, treatment options, and expected outcomes were discussed with the patient  The patient concurred with the proposed plan, giving informed consent  Cystoscopy was performed today under local anesthesia, using sterile technique  The patient was placed in the supine position, prepped with Betadine, and draped in the usual sterile fashion  The flexible cystocope was used to inspect both the urethra and bladder    Findings:  Urethra:  Normal without stricture  Prostate shows moderate occlusion  Bladder:  Smooth, not trabeculated and there were no stones tumors or other lesions  The orifices were orthotopic and intact    No recurrence           Specimens:  None                 Complications:  None           Disposition: To home            Condition:  Stable            The following portions of the patient's history were reviewed and updated as appropriate: allergies, current medications, past family history, past medical history, past social history, past surgical history and problem list   Past Medical History:   Diagnosis Date    Arthritis     Bladder tumor     BPH with obstruction/lower urinary tract symptoms     Cancer [facility])     bladder    Cold     11/15 symptoms/to call Dr Beckie Patrick if they persist or worsen    Dental crowns present     Elevated PSA     Hematuria, microscopic     History of total left knee replacement     twice one 2011/ and revision 2017    Hyperlipidemia     Hypertension     Irregular heart beat     not on EKGs anymore    Neuropathy     in feet    Prediabetes     Prostatitis, acute 09/18/2017    Seasonal allergies     Tooth missing     Wears glasses      Past Surgical History:   Procedure Laterality Date    CARDIAC CATHETERIZATION      approx 15 yrs ago for irreg heart beat at [facility]    COLONOSCOPY      CYSTOSCOPY      CYSTOSCOPY N/A 2/27/2019    Procedure: CYSTOSCOPY;  Surgeon: Ascencion Thompson MD;  [facility];  Service: Urology    HERNIA REPAIR Right     inguinal    INSTILLATION MYTOMYCIN N/A 4/25/2018    Procedure: INSTILLATION MITOMYCIN;  Surgeon: Ascencion Thompson MD;  [facility];  Service: Urology    JOINT REPLACEMENT Left     knee    KNEE ARTHROSCOPY Left 1991    ACL    LIPOMA RESECTION      ME CYSTOURETHROSCOPY N/A 11/29/2017    Procedure: Kalyn Hyman;  Surgeon: Ascencion Thompson MD;  [facility];  Service: Urology    ME CYSTOURETHROSCOPY,FULGUR 2-5 CM LESN N/A 11/29/2017    Procedure: TRANSURETHRAL RESECTION OF BLADDER TUMOR (TURBT); Surgeon: Ascencion Thompson MD;  [facility];  Service: Urology    ME CYSTOURETHROSCOPY,FULGUR <0 5 CM LESN N/A 2/27/2019    Procedure: TRANSURETHRAL RESECTION OF BLADDER TUMOR (TURBT); Surgeon: Ascencion Thompson MD;  [facility];  Service: Urology    ME CYSTOURETHROSCOPY,URETER CATHETER N/A 4/25/2018    Procedure: CYSTOSCOPY WITH RETROGRADE PYELOGRAM;  Surgeon: Ascencion Thompson MD;  [facility];  Service: Urology    ME INSTILL ANTICANCER AGENT IN BLADDER N/A 11/29/2017    Procedure: Katelin Batch;  Surgeon: Ascencion Thompson MD;  [facility];  Service: Urology    ME INSTILL ANTICANCER AGENT IN BLADDER N/A 2/27/2019    Procedure: Katelin Batch;  Surgeon: Ascencion Thompson MD;  [facility];  Service: Urology    SKIN BIOPSY      TONSILLECTOMY      TRANSURETHRAL RESECTION OF BLADDER TUMOR N/A 4/25/2018    Procedure: TRANSURETHRAL RESECTION OF BLADDER TUMOR (TURBT); Surgeon: Ascencion Thompson MD;  [facility];  Service: Urology     shoulder  Review of Systems   Review of Systems   Genitourinary: Negative          Active Problem List     Patient Active Problem List   Diagnosis    Malignant neoplasm of posterior wall of urinary bladder ([facility] [address] )    Malignant neoplasm of dome of urinary bladder ([facility] [address] )       Objective   There were no vitals taken for this visit  Physical Exam   Constitutional: He is oriented to person, place, and time  He appears well-developed and well-nourished  HENT:   Head: Normocephalic and atraumatic  Eyes: EOM are normal    Neck: Normal range of motion  Pulmonary/Chest: Effort normal    Musculoskeletal: Normal range of motion  Neurological: He is alert and oriented to person, place, and time  Skin: Skin is warm and dry  Psychiatric: He has a normal mood and affect   His behavior is normal  Judgment and thought content normal            Current Medications     Current Outpatient Medications:     Alpha-Lipoic Acid 600 MG CAPS, Take 600 mg by mouth daily  , Disp: , Rfl:     amoxicillin (AMOXIL) 500 MG tablet, Take 500 mg by mouth daily as needed (one hour prior to dental appt), Disp: , Rfl:     B Complex Vitamins (B COMPLEX 50 PO), Take by mouth daily, Disp: , Rfl:     bisoprolol-hydrochlorothiazide (ZIAC) 2 5-6 25 MG per tablet, Take 1 tablet by mouth daily, Disp: , Rfl:     cholecalciferol (VITAMIN D3) 1,000 units tablet, Take 1,000 Units by mouth daily, Disp: , Rfl:     CINNAMON PO, Take 500 mg by mouth daily  , Disp: , Rfl:     HYDROcodone-acetaminophen (NORCO) 5-325 mg per tablet, Take 1-2 tablets by mouth every 6 (six) hours as needed for pain for up to 10 dosesMax Daily Amount: 8 tablets (Patient not taking: Reported on 3/19/2019), Disp: 10 tablet, Rfl: 0    Krill Oil 500 MG CAPS, Take 500 mg by mouth daily  , Disp: , Rfl:     lisinopril (ZESTRIL) 10 mg tablet, Take 10 mg by mouth every evening, Disp: , Rfl:     Magnesium 500 MG TABS, Take 500 mg by mouth daily  , Disp: , Rfl:     Omega 3 1200 MG CAPS, Take 1 capsule by mouth daily, Disp: , Rfl:     Probiotic Product (PROBIOTIC PO), Take 1 capsule by mouth daily, Disp: , Rfl:     Saw Palmetto, Serenoa repens, (SAW PALMETTO BERRY PO), Take by mouth daily, Disp: , Rfl:     sildenafil (VIAGRA) 50 MG tablet, 50 mg daily as needed, Disp: , Rfl: 3    tamsulosin (FLOMAX) 0 4 mg, Take 0 4 mg by mouth daily with dinner, Disp: , Rfl:     Turmeric Curcumin 500 MG CAPS, Take 500 mg by mouth daily  , Disp: , Rfl:         Yomaira Stock MD

## 2019-07-26 NOTE — PROGRESS NOTES
[address] [facility]  [facility]  [address] [facility]  [place], [address] [place] [address]  [phone]  www  [facility]  org      NAME: Darryl Fajardo  AGE: 77 y o  SEX: male  : 1950   MRN: [id number]    DATE: 2019  TIME: 8:52 AM    Assessment and Plan:    Bladder cancer:  No recurrence  Follow-up 3 months for repeat cystoscopy  Encounter for prostate cancer screening:  Check PSA in a few weeks, send him the results  Chief Complaint   No chief complaint on file  History of Present Illness   Bladder cancer:  Status post TURBT of recurrence of bladder cancer 2019-there is a papillary tumor found on 7 o'clock position on the bladder neck  He underwent and mitomycin installation as well  Pathology 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 and that was uninvolved  Previous TURBT was 2018 with a posterior wall tumor and a tumor of low malignant potential the left ureteral orifice  He underwent 6 weeks of BCG since I last saw him  Here for surveillance cystoscopy  Cystoscopy  Date/Time: 2019 8:53 AM  Performed by: Maria Elena Vera MD  Authorized by: Maria Elena Vera MD     Procedure details: cystoscopy    Additional Procedure Details: Cystoscopy Procedure Note        Pre-operative Diagnosis:  Bladder cancer surveillance    Post-operative Diagnosis:  Same, no recurrence    Procedure: Flexible cystoscopy    Surgeon: Marleni Cartwright MD    Anesthesia: 1% Xylocaine per urethra    EBL: Minimal    Complications: none    Procedure Details   The risks, benefits, complications, treatment options, and expected outcomes were discussed with the patient  The patient concurred with the proposed plan, giving informed consent  Cystoscopy was performed today under local anesthesia, using sterile technique  The patient was placed in the supine position, prepped with Betadine, and draped in the usual sterile fashion  The flexible cystocope was used to inspect both the urethra and bladder    Findings:  Urethra:  Normal without stricture  Prostate shows moderate occlusion  Bladder:  Smooth, not trabeculated and there were no stones tumors or other lesions  The orifices were orthotopic and intact    No recurrence           Specimens:  None                 Complications:  None           Disposition: To home            Condition:  Stable            The following portions of the patient's history were reviewed and updated as appropriate: allergies, current medications, past family history, past medical history, past social history, past surgical history and problem list   Past Medical History:   Diagnosis Date    Arthritis     Bladder tumor     BPH with obstruction/lower urinary tract symptoms     Cancer [facility])     bladder    Cold     11/15 symptoms/to call Dr Kylah Guevara if they persist or worsen    Dental crowns present     Elevated PSA     Hematuria, microscopic     History of total left knee replacement     twice one 2011/ and revision 2017    Hyperlipidemia     Hypertension     Irregular heart beat     not on EKGs anymore    Neuropathy     in feet    Prediabetes     Prostatitis, acute 09/18/2017    Seasonal allergies     Tooth missing     Wears glasses      Past Surgical History:   Procedure Laterality Date    CARDIAC CATHETERIZATION      approx 15 yrs ago for irreg heart beat at [address] N/A 2/27/2019    Procedure: CYSTOSCOPY;  Surgeon: Magda Velasquez MD;  [facility];  Service: Urology    HERNIA REPAIR Right     inguinal    INSTILLATION MYTOMYCIN N/A 4/25/2018    Procedure: INSTILLATION MITOMYCIN;  Surgeon: Magda Velasquez MD;  [facility];  Service: Urology    JOINT REPLACEMENT Left     knee    KNEE ARTHROSCOPY Left 1991    ACL    LIPOMA RESECTION      OK CYSTOURETHROSCOPY N/A 11/29/2017    Procedure: CYSTOSCOPY;  Surgeon: Magda Velasquez MD; [facility];  Service: Urology    DC CYSTOURETHROSCOPY,FULGUR 2-5 CM LESN N/A 11/29/2017    Procedure: TRANSURETHRAL RESECTION OF BLADDER TUMOR (TURBT); Surgeon: Kiel Cabrera MD;  [facility];  Service: Urology    DC CYSTOURETHROSCOPY,FULGUR <0 5 CM LESN N/A 2/27/2019    Procedure: TRANSURETHRAL RESECTION OF BLADDER TUMOR (TURBT); Surgeon: Kiel Cabrera MD;  [facility];  Service: Urology    DC CYSTOURETHROSCOPY,URETER CATHETER N/A 4/25/2018    Procedure: CYSTOSCOPY WITH RETROGRADE PYELOGRAM;  Surgeon: Kiel Cabrera MD;  [facility];  Service: Urology    DC INSTILL ANTICANCER AGENT IN BLADDER N/A 11/29/2017    Procedure: Kaila Wesley;  Surgeon: Kiel Cabrera MD;  [facility];  Service: Urology    DC INSTILL ANTICANCER AGENT IN BLADDER N/A 2/27/2019    Procedure: Kaila Wesley;  Surgeon: Kiel Cabrera MD;  [facility];  Service: Urology    SKIN BIOPSY      TONSILLECTOMY      TRANSURETHRAL RESECTION OF BLADDER TUMOR N/A 4/25/2018    Procedure: TRANSURETHRAL RESECTION OF BLADDER TUMOR (TURBT); Surgeon: Kiel Cabrera MD;  [facility];  Service: Urology     shoulder  Review of Systems   Review of Systems   Genitourinary: Negative  Active Problem List     Patient Active Problem List   Diagnosis    Malignant neoplasm of posterior wall of urinary bladder ([address] )    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)       Objective   There were no vitals taken for this visit  Physical Exam   Constitutional: He is oriented to person, place, and time  He appears well-developed and well-nourished  HENT:   Head: Normocephalic and atraumatic  Eyes: EOM are normal    Neck: Normal range of motion  Pulmonary/Chest: Effort normal    Musculoskeletal: Normal range of motion  Neurological: He is alert and oriented to person, place, and time  Skin: Skin is warm and dry  Psychiatric: He has a normal mood and affect   His behavior is normal  Judgment and thought content normal            Current Medications     Current Outpatient Medications:     Alpha-Lipoic Acid 600 MG CAPS, Take 600 mg by mouth daily  , Disp: , Rfl:     amoxicillin (AMOXIL) 500 MG tablet, Take 500 mg by mouth daily as needed (one hour prior to dental appt), Disp: , Rfl:     B Complex Vitamins (B COMPLEX 50 PO), Take by mouth daily, Disp: , Rfl:     bisoprolol-hydrochlorothiazide (ZIAC) 2 5-6 25 MG per tablet, Take 1 tablet by mouth daily, Disp: , Rfl:     cholecalciferol (VITAMIN D3) 1,000 units tablet, Take 1,000 Units by mouth daily, Disp: , Rfl:     CINNAMON PO, Take 500 mg by mouth daily  , Disp: , Rfl:     HYDROcodone-acetaminophen (NORCO) 5-325 mg per tablet, Take 1-2 tablets by mouth every 6 (six) hours as needed for pain for up to 10 dosesMax Daily Amount: 8 tablets (Patient not taking: Reported on 3/19/2019), Disp: 10 tablet, Rfl: 0    Krill Oil 500 MG CAPS, Take 500 mg by mouth daily  , Disp: , Rfl:     lisinopril (ZESTRIL) 10 mg tablet, Take 10 mg by mouth every evening, Disp: , Rfl:     Magnesium 500 MG TABS, Take 500 mg by mouth daily  , Disp: , Rfl:     Omega 3 1200 MG CAPS, Take 1 capsule by mouth daily, Disp: , Rfl:     Probiotic Product (PROBIOTIC PO), Take 1 capsule by mouth daily, Disp: , Rfl:     Saw Palmetto, Serenoa repens, (SAW PALMETTO BERRY PO), Take by mouth daily, Disp: , Rfl:     sildenafil (VIAGRA) 50 MG tablet, 50 mg daily as needed, Disp: , Rfl: 3    tamsulosin (FLOMAX) 0 4 mg, Take 0 4 mg by mouth daily with dinner, Disp: , Rfl:     Turmeric Curcumin 500 MG CAPS, Take 500 mg by mouth daily  , Disp: , Rfl:         Maddison Del Castillo MD

## 2019-08-09 ENCOUNTER — TELEPHONE (OUTPATIENT)
Dept: UROLOGY | Facility: MEDICAL CENTER | Age: 69
End: 2019-08-09

## 2019-08-09 ENCOUNTER — LAB (OUTPATIENT)
Dept: LAB | Age: 69
End: 2019-08-09
Payer: MEDICARE

## 2019-08-09 DIAGNOSIS — Z12.5 ENCOUNTER FOR PROSTATE CANCER SCREENING: ICD-10-CM

## 2019-08-09 LAB — PSA SERPL-MCNC: 3.8 NG/ML (ref 0–4)

## 2019-08-09 PROCEDURE — G0103 PSA SCREENING: HCPCS

## 2019-08-09 PROCEDURE — 36415 COLL VENOUS BLD VENIPUNCTURE: CPT

## 2019-08-09 NOTE — TELEPHONE ENCOUNTER
----- Message from Eros Farias MD sent at 8/9/2019 12:04 PM EDT -----  Please inform patient that the results are normal

## 2019-08-09 NOTE — TELEPHONE ENCOUNTER
Spoke with the patient; Patient's PSA is normal at 3 8  Patient is pleased and has no questions or concerns to be addressed at this time

## 2019-10-25 ENCOUNTER — PROCEDURE VISIT (OUTPATIENT)
Dept: UROLOGY | Facility: MEDICAL CENTER | Age: 69
End: 2019-10-25
Payer: MEDICARE

## 2019-10-25 VITALS
WEIGHT: 186 LBS | HEIGHT: 71 IN | HEART RATE: 74 BPM | SYSTOLIC BLOOD PRESSURE: 120 MMHG | DIASTOLIC BLOOD PRESSURE: 70 MMHG | BODY MASS INDEX: 26.04 KG/M2

## 2019-10-25 DIAGNOSIS — Z12.5 ENCOUNTER FOR PROSTATE CANCER SCREENING: ICD-10-CM

## 2019-10-25 DIAGNOSIS — C67.4 MALIGNANT NEOPLASM OF POSTERIOR WALL OF URINARY BLADDER (HCC): ICD-10-CM

## 2019-10-25 DIAGNOSIS — C67.5 MALIGNANT NEOPLASM OF URINARY BLADDER NECK (HCC): Primary | ICD-10-CM

## 2019-10-25 PROCEDURE — 52000 CYSTOURETHROSCOPY: CPT | Performed by: UROLOGY

## 2019-10-25 PROCEDURE — 81003 URINALYSIS AUTO W/O SCOPE: CPT | Performed by: UROLOGY

## 2019-10-25 PROCEDURE — 99213 OFFICE O/P EST LOW 20 MIN: CPT | Performed by: UROLOGY

## 2019-10-25 RX ORDER — CIPROFLOXACIN 500 MG/1
500 TABLET, FILM COATED ORAL ONCE
Qty: 1 TABLET | Refills: 0 | Status: SHIPPED | OUTPATIENT
Start: 2019-10-25 | End: 2019-10-25

## 2019-10-25 NOTE — PROGRESS NOTES
[address] [facility]  [facility]  [address] [facility]  [place], [address] [facility]  [phone]  www  [facility]  org      NAME: Eriberto George  AGE: 77 y o  SEX: male  : 1950   MRN: [id number]    DATE: 10/25/2019  TIME: 1:20 PM    Assessment and Plan:    Bladder cancer with no recurrence on cystoscopic surveillance-repeat cysto in 3 months, it that 1 is negative we can go to every 6 months  BPH with obstruction:  Stay on the tamsulosin, he is doing okay on this but he comes worse will start Proscar  Encounter for prostate cancer screening:  PSA within normal limits, recheck in 1 year  Chief Complaint   No chief complaint on file  History of Present Illness   Bladder cancer:  Status post TURBT of recurrence of bladder cancer 2019-had mitomycin as well  Pathology 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 and that was uninvolved  His previous TURBT was 2018 with a posterior wall tumor and a tumor of low malignant potential at the left ureteral orifice  He underwent 6 weeks BCG after the recurrence  Here for surveillance cystoscopy  Encounter for prostate cancer screening:  PSA was 3 8 as of 2019  It was 3 1 year before that  BPH with obstruction:  He is on tamsulosin  We discussed Proscar        Cystoscopy  Date/Time: 10/25/2019 1:21 PM  Performed by: Renee Dumont MD  Authorized by: Renee Dumont MD     Procedure details: cystoscopy    Additional Procedure Details: Cystoscopy Procedure Note        Pre-operative Diagnosis:  Bladder cancer status post TURBT 2019 , 3 month surveillance cystoscopy    Post-operative Diagnosis:  Same, no recurrence    Procedure: Flexible cystoscopy    Surgeon: Manuelito Hutchinson MD    Anesthesia: 1% Xylocaine per urethra    EBL: Minimal    Complications: none    Procedure Details   The risks, benefits, complications, treatment options, and expected outcomes were discussed with the patient  The patient concurred with the proposed plan, giving informed consent  Cystoscopy was performed today under local anesthesia, using sterile technique  The patient was placed in the supine position, prepped with Betadine, and draped in the usual sterile fashion  The flexible cystocope was used to inspect both the urethra and bladder    Findings:  Urethra:  Normal without stricture  His prostate is obstructive with intravesical  intrusion  Bladder:  Smooth, not trabeculated and there were no stones tumors or other lesions  The orifices were orthotopic and intact  No recurrence             Specimens:  None                 Complications:  None           Disposition: To home            Condition:  Stable            The following portions of the patient's history were reviewed and updated as appropriate: allergies, current medications, past family history, past medical history, past social history, past surgical history and problem list   Past Medical History:   Diagnosis Date    Arthritis     Bladder tumor     BPH with obstruction/lower urinary tract symptoms     Cancer [facility])     bladder    Cold     11/15 symptoms/to call Dr Moris Agudelo if they persist or worsen    Dental crowns present     Elevated PSA     Hematuria, microscopic     History of total left knee replacement     twice one 2011/ and revision 2017    Hyperlipidemia     Hypertension     Irregular heart beat     not on EKGs anymore    Neuropathy     in feet    Prediabetes     Prostatitis, acute 09/18/2017    Seasonal allergies     Tooth missing     Wears glasses      Past Surgical History:   Procedure Laterality Date    CARDIAC CATHETERIZATION      approx 15 yrs ago for irreg heart beat at [address] N/A 2/27/2019    Procedure: CYSTOSCOPY;  Surgeon: Jaiden Rizvi MD;  Location: [facility];  Service: Urology    HERNIA REPAIR Right     inguinal    INSTILLATION MYTOMYCIN N/A 4/25/2018    Procedure: INSTILLATION MITOMYCIN;  Surgeon: Jarett Ferrer MD;  [facility];  Service: Urology    JOINT REPLACEMENT Left     knee    KNEE ARTHROSCOPY Left 1991    ACL    LIPOMA RESECTION      PA CYSTOURETHROSCOPY N/A 11/29/2017    Procedure: Anders Reeves;  Surgeon: Jarett Ferrer MD;  [facility];  Service: Urology    PA CYSTOURETHROSCOPY,FULGUR 2-5 CM LESN N/A 11/29/2017    Procedure: TRANSURETHRAL RESECTION OF BLADDER TUMOR (TURBT); Surgeon: Jarett Ferrer MD;  [facility];  Service: Urology    PA CYSTOURETHROSCOPY,FULGUR <0 5 CM LESN N/A 2/27/2019    Procedure: TRANSURETHRAL RESECTION OF BLADDER TUMOR (TURBT); Surgeon: Jarett Ferrer MD;  [facility];  Service: Urology    PA CYSTOURETHROSCOPY,URETER CATHETER N/A 4/25/2018    Procedure: CYSTOSCOPY WITH RETROGRADE PYELOGRAM;  Surgeon: Jarett Ferrer MD;  [facility];  Service: Urology    PA INSTILL ANTICANCER AGENT IN BLADDER N/A 11/29/2017    Procedure: Goldie Clophill;  Surgeon: Jarett Ferrer MD;  [facility];  Service: Urology    PA INSTILL ANTICANCER AGENT IN BLADDER N/A 2/27/2019    Procedure: Goldie Clophill;  Surgeon: Jarett Ferrer MD;  [facility];  Service: Urology    SKIN BIOPSY      TONSILLECTOMY      TRANSURETHRAL RESECTION OF BLADDER TUMOR N/A 4/25/2018    Procedure: TRANSURETHRAL RESECTION OF BLADDER TUMOR (TURBT); Surgeon: Jarett Ferrer MD;  [facility];  Service: Urology     shoulder  Review of Systems   Review of Systems   Genitourinary: Negative  Active Problem List     Patient Active Problem List   Diagnosis    Malignant neoplasm of posterior wall of urinary bladder ([address] )    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)       Objective   There were no vitals taken for this visit  Physical Exam   Constitutional: He is oriented to person, place, and time  He appears well-developed and well-nourished  HENT:   Head: Normocephalic and atraumatic  Eyes: EOM are normal    Neck: Normal range of motion  Pulmonary/Chest: Effort normal    Genitourinary: Penis normal    Musculoskeletal: Normal range of motion  Neurological: He is alert and oriented to person, place, and time  Skin: Skin is warm and dry  Psychiatric: He has a normal mood and affect   His behavior is normal  Judgment and thought content normal            Current Medications     Current Outpatient Medications:     Alpha-Lipoic Acid 600 MG CAPS, Take 600 mg by mouth daily  , Disp: , Rfl:     amoxicillin (AMOXIL) 500 MG tablet, Take 500 mg by mouth daily as needed (one hour prior to dental appt), Disp: , Rfl:     B Complex Vitamins (B COMPLEX 50 PO), Take by mouth daily, Disp: , Rfl:     bisoprolol-hydrochlorothiazide (ZIAC) 2 5-6 25 MG per tablet, Take 1 tablet by mouth daily, Disp: , Rfl:     cholecalciferol (VITAMIN D3) 1,000 units tablet, Take 1,000 Units by mouth daily, Disp: , Rfl:     CINNAMON PO, Take 500 mg by mouth daily  , Disp: , Rfl:     Krill Oil 500 MG CAPS, Take 500 mg by mouth daily  , Disp: , Rfl:     lisinopril (ZESTRIL) 10 mg tablet, Take 10 mg by mouth every evening, Disp: , Rfl:     Magnesium 500 MG TABS, Take 500 mg by mouth daily  , Disp: , Rfl:     Omega 3 1200 MG CAPS, Take 1 capsule by mouth daily, Disp: , Rfl:     Probiotic Product (PROBIOTIC PO), Take 1 capsule by mouth daily, Disp: , Rfl:     Saw Palmetto, Serenoa repens, (SAW PALMETTO BERRY PO), Take by mouth daily, Disp: , Rfl:     sildenafil (VIAGRA) 50 MG tablet, 50 mg daily as needed, Disp: , Rfl: 3    tamsulosin (FLOMAX) 0 4 mg, Take 0 4 mg by mouth daily with dinner, Disp: , Rfl:     Turmeric Curcumin 500 MG CAPS, Take 500 mg by mouth daily  , Disp: , Rfl:         Jaquan Hair MD

## 2019-10-25 NOTE — LETTER
2019     Karla Esquivel MD  [address] [employer]    Patient: Antione Wilson   YOB: 1950   Date of Visit: 10/25/2019       Dear Dr Maddox Cargo: Thank you for referring Ho Crawley to me for evaluation  Below are my notes for this consultation  If you have questions, please do not hesitate to call me  I look forward to following your patient along with you  Sincerely,        Amor Wang MD        CC: No Recipients  Amor Wang MD  10/25/2019  2:20 PM  Sign at close encounter  [address] [facility]  [address], [address] [facility]  [phone]  www  [facility]  org      NAME: Antione Wilson  AGE: 77 y o  SEX: male  : 1950   MRN: [id number]    DATE: 10/25/2019  TIME: 1:20 PM    Assessment and Plan:    Bladder cancer with no recurrence on cystoscopic surveillance-repeat cysto in 3 months, it that 1 is negative we can go to every 6 months  BPH with obstruction:  Stay on the tamsulosin, he is doing okay on this but he comes worse will start Proscar  Encounter for prostate cancer screening:  PSA within normal limits, recheck in 1 year  Chief Complaint   No chief complaint on file  History of Present Illness   Bladder cancer:  Status post TURBT of recurrence of bladder cancer 2019-had mitomycin as well  Pathology 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 and that was uninvolved  His previous TURBT was 2018 with a posterior wall tumor and a tumor of low malignant potential at the left ureteral orifice  He underwent 6 weeks BCG after the recurrence  Here for surveillance cystoscopy  Encounter for prostate cancer screening:  PSA was 3 8 as of 2019  It was 3 1 year before that  BPH with obstruction:  He is on tamsulosin  We discussed Proscar        Cystoscopy  Date/Time: 10/25/2019 1:21 PM  Performed by: Isaura Preciado MD  Authorized by: Isaura Preciado MD     Procedure details: cystoscopy    Additional Procedure Details: Cystoscopy Procedure Note        Pre-operative Diagnosis:  Bladder cancer status post TURBT 2/22/2019 , 3 month surveillance cystoscopy    Post-operative Diagnosis:  Same, no recurrence    Procedure: Flexible cystoscopy    Surgeon: Dinora Rivas MD    Anesthesia: 1% Xylocaine per urethra    EBL: Minimal    Complications: none    Procedure Details   The risks, benefits, complications, treatment options, and expected outcomes were discussed with the patient  The patient concurred with the proposed plan, giving informed consent  Cystoscopy was performed today under local anesthesia, using sterile technique  The patient was placed in the supine position, prepped with Betadine, and draped in the usual sterile fashion  The flexible cystocope was used to inspect both the urethra and bladder    Findings:  Urethra:  Normal without stricture  His prostate is obstructive with intravesical  intrusion  Bladder:  Smooth, not trabeculated and there were no stones tumors or other lesions  The orifices were orthotopic and intact  No recurrence             Specimens:  None                 Complications:  None           Disposition: To home            Condition:  Stable            The following portions of the patient's history were reviewed and updated as appropriate: allergies, current medications, past family history, past medical history, past social history, past surgical history and problem list   Past Medical History:   Diagnosis Date    Arthritis     Bladder tumor     BPH with obstruction/lower urinary tract symptoms     Cancer [facility])     bladder    Cold     11/15 symptoms/to call Dr Evelin Rubio if they persist or worsen    Dental crowns present     Elevated PSA     Hematuria, microscopic     History of total left knee replacement     twice one 2011/ and revision 2017    Hyperlipidemia     Hypertension     Irregular heart beat     not on EKGs anymore    Neuropathy     in feet    Prediabetes     Prostatitis, acute 09/18/2017    Seasonal allergies     Tooth missing     Wears glasses      Past Surgical History:   Procedure Laterality Date    CARDIAC CATHETERIZATION      approx 15 yrs ago for irreg heart beat at [facility]    COLONOSCOPY      CYSTOSCOPY      CYSTOSCOPY N/A 2/27/2019    Procedure: CYSTOSCOPY;  Surgeon: Jarett Ferrer MD;  [facility];  Service: Urology    HERNIA REPAIR Right     inguinal    INSTILLATION MYTOMYCIN N/A 4/25/2018    Procedure: INSTILLATION MITOMYCIN;  Surgeon: Jarett Ferrer MD;  [facility];  Service: Urology    JOINT REPLACEMENT Left     knee    KNEE ARTHROSCOPY Left 1991    ACL    LIPOMA RESECTION      MT CYSTOURETHROSCOPY N/A 11/29/2017    Procedure: CYSTOSCOPY;  Surgeon: Jarett Ferrer MD;  [facility];  Service: Urology    MT CYSTOURETHROSCOPY,FULGUR 2-5 CM LESN N/A 11/29/2017    Procedure: TRANSURETHRAL RESECTION OF BLADDER TUMOR (TURBT); Surgeon: Jarett Ferrer MD;  [facility];  Service: Urology    MT CYSTOURETHROSCOPY,FULGUR <0 5 CM LESN N/A 2/27/2019    Procedure: TRANSURETHRAL RESECTION OF BLADDER TUMOR (TURBT);   Surgeon: Jarett Ferrer MD;  [facility];  Service: Urology    MT CYSTOURETHROSCOPY,URETER CATHETER N/A 4/25/2018    Procedure: CYSTOSCOPY WITH RETROGRADE PYELOGRAM;  Surgeon: Jarett Ferrer MD;  [facility];  Service: Urology    MT INSTILL ANTICANCER AGENT IN BLADDER N/A 11/29/2017    Procedure: Goldie Whatley;  Surgeon: Jarett Ferrer MD;  [facility];  Service: Urology    MT INSTILL ANTICANCER AGENT IN BLADDER N/A 2/27/2019    Procedure: Goldie Whatley;  Surgeon: Jarett Ferrer MD;  [facility];  Service: Urology    SKIN BIOPSY      TONSILLECTOMY      TRANSURETHRAL RESECTION OF BLADDER TUMOR N/A 4/25/2018    Procedure: TRANSURETHRAL RESECTION OF BLADDER TUMOR (TURBT); Surgeon: Cody Robb MD;  [facility];  Service: Urology     shoulder  Review of Systems   Review of Systems   Genitourinary: Negative  Active Problem List     Patient Active Problem List   Diagnosis    Malignant neoplasm of posterior wall of urinary bladder ([address] )    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)       Objective   There were no vitals taken for this visit  Physical Exam   Constitutional: He is oriented to person, place, and time  He appears well-developed and well-nourished  HENT:   Head: Normocephalic and atraumatic  Eyes: EOM are normal    Neck: Normal range of motion  Pulmonary/Chest: Effort normal    Genitourinary: Penis normal    Musculoskeletal: Normal range of motion  Neurological: He is alert and oriented to person, place, and time  Skin: Skin is warm and dry  Psychiatric: He has a normal mood and affect   His behavior is normal  Judgment and thought content normal            Current Medications     Current Outpatient Medications:     Alpha-Lipoic Acid 600 MG CAPS, Take 600 mg by mouth daily  , Disp: , Rfl:     amoxicillin (AMOXIL) 500 MG tablet, Take 500 mg by mouth daily as needed (one hour prior to dental appt), Disp: , Rfl:     B Complex Vitamins (B COMPLEX 50 PO), Take by mouth daily, Disp: , Rfl:     bisoprolol-hydrochlorothiazide (ZIAC) 2 5-6 25 MG per tablet, Take 1 tablet by mouth daily, Disp: , Rfl:     cholecalciferol (VITAMIN D3) 1,000 units tablet, Take 1,000 Units by mouth daily, Disp: , Rfl:     CINNAMON PO, Take 500 mg by mouth daily  , Disp: , Rfl:     Krill Oil 500 MG CAPS, Take 500 mg by mouth daily  , Disp: , Rfl:     lisinopril (ZESTRIL) 10 mg tablet, Take 10 mg by mouth every evening, Disp: , Rfl:     Magnesium 500 MG TABS, Take 500 mg by mouth daily  , Disp: , Rfl:     Omega 3 1200 MG CAPS, Take 1 capsule by mouth daily, Disp: , Rfl:     Probiotic Product (PROBIOTIC PO), Take 1 capsule by mouth daily, Disp: , Rfl:     Saw Palmetto, Serenoa repens, (SAW PALMETTO BERRY PO), Take by mouth daily, Disp: , Rfl:     sildenafil (VIAGRA) 50 MG tablet, 50 mg daily as needed, Disp: , Rfl: 3    tamsulosin (FLOMAX) 0 4 mg, Take 0 4 mg by mouth daily with dinner, Disp: , Rfl:     Turmeric Curcumin 500 MG CAPS, Take 500 mg by mouth daily  , Disp: , Rfl:         Toni Fish MD

## 2019-11-20 ENCOUNTER — TRANSCRIBE ORDERS (OUTPATIENT)
Dept: ADMINISTRATIVE | Facility: HOSPITAL | Age: 69
End: 2019-11-20

## 2019-11-20 ENCOUNTER — APPOINTMENT (OUTPATIENT)
Dept: LAB | Age: 69
End: 2019-11-20
Payer: MEDICARE

## 2019-11-20 DIAGNOSIS — R80.0 ISOLATED NON-NEPHROTIC PROTEINURIA: Primary | ICD-10-CM

## 2019-11-20 LAB
CREAT UR-MCNC: 157 MG/DL
MICROALBUMIN UR-MCNC: 32.2 MG/L (ref 0–20)
MICROALBUMIN/CREAT 24H UR: 21 MG/G CREATININE (ref 0–30)

## 2019-11-20 PROCEDURE — 82570 ASSAY OF URINE CREATININE: CPT | Performed by: INTERNAL MEDICINE

## 2019-11-20 PROCEDURE — 82043 UR ALBUMIN QUANTITATIVE: CPT | Performed by: INTERNAL MEDICINE

## 2020-01-31 ENCOUNTER — PROCEDURE VISIT (OUTPATIENT)
Dept: UROLOGY | Facility: MEDICAL CENTER | Age: 70
End: 2020-01-31
Payer: MEDICARE

## 2020-01-31 VITALS
DIASTOLIC BLOOD PRESSURE: 78 MMHG | BODY MASS INDEX: 25.48 KG/M2 | WEIGHT: 182 LBS | SYSTOLIC BLOOD PRESSURE: 138 MMHG | HEIGHT: 71 IN

## 2020-01-31 DIAGNOSIS — C67.5 MALIGNANT NEOPLASM OF URINARY BLADDER NECK (HCC): Primary | ICD-10-CM

## 2020-01-31 DIAGNOSIS — C67.4 MALIGNANT NEOPLASM OF POSTERIOR WALL OF URINARY BLADDER (HCC): ICD-10-CM

## 2020-01-31 LAB
SL AMB  POCT GLUCOSE, UA: NORMAL
SL AMB LEUKOCYTE ESTERASE,UA: NORMAL
SL AMB POCT BILIRUBIN,UA: NORMAL
SL AMB POCT BLOOD,UA: NORMAL
SL AMB POCT CLARITY,UA: CLEAR
SL AMB POCT COLOR,UA: YELLOW
SL AMB POCT KETONES,UA: NORMAL
SL AMB POCT NITRITE,UA: NORMAL
SL AMB POCT PH,UA: 5.5
SL AMB POCT SPECIFIC GRAVITY,UA: 1.02
SL AMB POCT URINE PROTEIN: NORMAL
SL AMB POCT UROBILINOGEN: 0.2

## 2020-01-31 PROCEDURE — 52000 CYSTOURETHROSCOPY: CPT | Performed by: UROLOGY

## 2020-01-31 PROCEDURE — 81003 URINALYSIS AUTO W/O SCOPE: CPT | Performed by: UROLOGY

## 2020-01-31 RX ORDER — GUAIFENESIN AND CODEINE PHOSPHATE 100; 10 MG/5ML; MG/5ML
500 SOLUTION ORAL ONCE
Qty: 14 TABLET | Refills: 0 | Status: SHIPPED | OUTPATIENT
Start: 2020-01-31 | End: 2020-01-31

## 2020-01-31 NOTE — PROGRESS NOTES
[address] [facility]  [facility]  [address] [facility]  [place], [address] [place] [address]  [phone]  www  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 and that was uninvolved  His previous TURBT was 2018 with a posterior wall tumor and a tumor of low malignant potential at the left ureteral orifice  He underwent 6 weeks BCG after the recurrence  Here for surveillance cystoscopy  Cystoscopy  Date/Time: 2020 9:06 AM  Performed by: Abigail Johnson MD  Authorized by: Abigail Johnson MD     Procedure details: cystoscopy    Additional Procedure Details: Cystoscopy Procedure Note        Pre-operative Diagnosis:  Bladder cancer, 3 month surveillance    Post-operative Diagnosis:  SameNo recurrence    Procedure: Flexible cystoscopy    Surgeon: Ricky Gonzalez MD    Anesthesia: 1% Xylocaine per urethra    EBL: Minimal    Complications: none    Procedure Details   The risks, benefits, complications, treatment options, and expected outcomes were discussed with the patient  The patient concurred with the proposed plan, giving informed consent  Cystoscopy was performed today under local anesthesia, using sterile technique  The patient was placed in the supine position, prepped with Betadine, and draped in the usual sterile fashion   The flexible cystocope was used to inspect both the urethra and bladder    Findings:  Urethra: normal without stricture  Prostate is moderately occlusive  Bladder:  Smooth, not trabeculated and there were no stones tumors or other lesions  The orifices were orthotopic and intact    No recurrence           Specimens:  None                 Complications:  None           Disposition: To home            Condition:  Stable          The following portions of the patient's history were reviewed and updated as appropriate: allergies, current medications, past family history, past medical history, past social history, past surgical history and problem list   Past Medical History:   Diagnosis Date    Arthritis     Bladder tumor     BPH with obstruction/lower urinary tract symptoms     Cancer [facility])     bladder    Cold     11/15 symptoms/to call Dr Delfina Miramontes if they persist or worsen    Dental crowns present     Elevated PSA     Hematuria, microscopic     History of total left knee replacement     twice one 2011/ and revision 2017    Hyperlipidemia     Hypertension     Irregular heart beat     not on EKGs anymore    Neuropathy     in feet    Prediabetes     Prostatitis, acute 09/18/2017    Seasonal allergies     Tooth missing     Wears glasses      Past Surgical History:   Procedure Laterality Date    CARDIAC CATHETERIZATION      approx 15 yrs ago for irreg heart beat at [address] N/A 2/27/2019    Procedure: CYSTOSCOPY;  Surgeon: Kyle Maher MD;  [facility];  Service: Urology    HERNIA REPAIR Right     inguinal    INSTILLATION MYTOMYCIN N/A 4/25/2018    Procedure: INSTILLATION MITOMYCIN;  Surgeon: Kyle Maher MD;  [facility];  Service: Urology    JOINT REPLACEMENT Left     knee    KNEE ARTHROSCOPY Left 1991    ACL    LIPOMA RESECTION      [place] CYSTOURETHROSCOPY N/A 11/29/2017    Procedure: CYSTOSCOPY;  Surgeon: Kyle Maher MD;  [facility];  Service: Urology    [place] [address] [place] [address] 2-5 CM LESN N/A 11/29/2017    Procedure: TRANSURETHRAL RESECTION OF BLADDER TUMOR (TURBT); Surgeon: Dwayne Ceballos MD;  [facility];  Service: Urology    [place] CYSTOURETHROSCOPY,FULGUR <0 5 CM LESN N/A 2/27/2019    Procedure: TRANSURETHRAL RESECTION OF BLADDER TUMOR (TURBT); Surgeon: Dwayne Ceballos MD;  [facility];  Service: Urology    [place] CYSTOURETHROSCOPY,URETER CATHETER N/A 4/25/2018    Procedure: CYSTOSCOPY WITH RETROGRADE PYELOGRAM;  Surgeon: Dwayne Ceballos MD;  [facility];  Service: Urology    [place] INSTILL ANTICANCER AGENT IN BLADDER N/A 11/29/2017    Procedure: Fernie Zamora;  Surgeon: Dwayne Ceballos MD;  [facility];  Service: Urology    [place] INSTILL ANTICANCER AGENT IN BLADDER N/A 2/27/2019    Procedure: Fernie Zamora;  Surgeon: Dwayne Ceballos MD;  [facility];  Service: Urology    SKIN BIOPSY      TONSILLECTOMY      TRANSURETHRAL RESECTION OF BLADDER TUMOR N/A 4/25/2018    Procedure: TRANSURETHRAL RESECTION OF BLADDER TUMOR (TURBT);   Surgeon: Dwayne Ceballos MD;  [facility];  Service: Urology     shoulder  Review of Systems   Review of Systems    Active Problem List     Patient Active Problem List   Diagnosis    Malignant neoplasm of posterior wall of urinary bladder ([address] )    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)       Objective   Ht 5' 11" (1 803 m)   BMI 25 94 kg/m²     Physical Exam        Current Medications     Current Outpatient Medications:     Alpha-Lipoic Acid 600 MG CAPS, Take 600 mg by mouth daily  , Disp: , Rfl:     amoxicillin (AMOXIL) 500 MG tablet, Take 500 mg by mouth daily as needed (one hour prior to dental appt), Disp: , Rfl:     B Complex Vitamins (B COMPLEX 50 PO), Take by mouth daily, Disp: , Rfl:     bisoprolol-hydrochlorothiazide (ZIAC) 2 5-6 25 MG per tablet, Take 1 tablet by mouth daily, Disp: , Rfl:     cholecalciferol (VITAMIN D3) 1,000 units tablet, Take 500 Units by mouth daily , Disp: , Rfl:   CINNAMON PO, Take 500 mg by mouth daily  , Disp: , Rfl:     Krill Oil 500 MG CAPS, Take 500 mg by mouth daily  , Disp: , Rfl:     lisinopril (ZESTRIL) 10 mg tablet, Take 10 mg by mouth every evening, Disp: , Rfl:     Magnesium 500 MG TABS, Take 500 mg by mouth daily  , Disp: , Rfl:     Omega 3 1200 MG CAPS, Take 1 capsule by mouth daily, Disp: , Rfl:     Probiotic Product (PROBIOTIC PO), Take 1 capsule by mouth daily, Disp: , Rfl:     Saw Palmetto, Serenoa repens, (SAW PALMETTO BERRY PO), Take by mouth daily, Disp: , Rfl:     sildenafil (VIAGRA) 50 MG tablet, 50 mg daily as needed, Disp: , Rfl: 3    tamsulosin (FLOMAX) 0 4 mg, Take 0 4 mg by mouth daily with dinner, Disp: , Rfl:     Turmeric Curcumin 500 MG CAPS, Take 500 mg by mouth daily  , Disp: , Rfl:         Jaiden Rizvi MD

## 2020-01-31 NOTE — LETTER
2020     Francois Elena MD  [address]    Patient: Bronwyn Estes   YOB: 1950   Date of Visit: 2020       Dear Dr Neftali Sanchez: Thank you for referring Brian Plunkett to me for evaluation  Below are my notes for this consultation  If you have questions, please do not hesitate to call me  I look forward to following your patient along with you  Sincerely,        Leon Spencer MD        CC: No Recipients  Leon Spencer MD  2020  9:31 AM  Sign at close encounter  [address] [facility]  [address], [address] [place] [address]  [phone]  www  [facility]  org      NAME: Bronwyn Estes  AGE: 77 y o  SEX: male  : 1950   MRN: [id number]    DATE: 2020  TIME: 9:04 AM    Assessment and Plan:    Bladder cancer as below, no recurrence  Next cystoscopy should be in 6 months  He already took a Cipro   Before procedure  And I will prescribe more Cipro to take prophylactically  Chief Complaint   No chief complaint on file  History of Present Illness   Bladder cancer:  Status post TURBT of recurrence of bladder cancer 2019-had mitomycin as well  Pathology 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 and that was uninvolved  His previous TURBT was 2018 with a posterior wall tumor and a tumor of low malignant potential at the left ureteral orifice  He underwent 6 weeks BCG after the recurrence  Here for surveillance cystoscopy    Cystoscopy  Date/Time: 2020 9:06 AM  Performed by: Leon Spencer MD  Authorized by: Leon Spencer MD     Procedure details: cystoscopy    Additional Procedure Details: Cystoscopy Procedure Note        Pre-operative Diagnosis:  Bladder cancer, 3 month surveillance    Post-operative Diagnosis:  SameNo recurrence    Procedure: Flexible cystoscopy    Surgeon: Anny White MD    Anesthesia: 1% Xylocaine per urethra    EBL: Minimal    Complications: none    Procedure Details   The risks, benefits, complications, treatment options, and expected outcomes were discussed with the patient  The patient concurred with the proposed plan, giving informed consent  Cystoscopy was performed today under local anesthesia, using sterile technique  The patient was placed in the supine position, prepped with Betadine, and draped in the usual sterile fashion  The flexible cystocope was used to inspect both the urethra and bladder    Findings:  Urethra: normal without stricture  Prostate is moderately occlusive  Bladder:  Smooth, not trabeculated and there were no stones tumors or other lesions  The orifices were orthotopic and intact    No recurrence           Specimens:  None                 Complications:  None           Disposition: To home            Condition:  Stable          The following portions of the patient's history were reviewed and updated as appropriate: allergies, current medications, past family history, past medical history, past social history, past surgical history and problem list   Past Medical History:   Diagnosis Date    Arthritis     Bladder tumor     BPH with obstruction/lower urinary tract symptoms     Cancer [facility])     bladder    Cold     11/15 symptoms/to call Dr Evelin Rubio if they persist or worsen    Dental crowns present     Elevated PSA     Hematuria, microscopic     History of total left knee replacement     twice one 2011/ and revision 2017    Hyperlipidemia     Hypertension     Irregular heart beat     not on EKGs anymore    Neuropathy     in feet    Prediabetes     Prostatitis, acute 09/18/2017    Seasonal allergies     Tooth missing     Wears glasses      Past Surgical History:   Procedure Laterality Date    CARDIAC CATHETERIZATION      approx 15 yrs ago for irreg heart beat at [address] N/A 2/27/2019 Procedure: CYSTOSCOPY;  Surgeon: Abe Valverde MD;  [facility];  Service: Urology    HERNIA REPAIR Right     inguinal    INSTILLATION MYTOMYCIN N/A 4/25/2018    Procedure: INSTILLATION MITOMYCIN;  Surgeon: Abe Valverde MD;  [facility];  Service: Urology    JOINT REPLACEMENT Left     knee    KNEE ARTHROSCOPY Left 1991    ACL    LIPOMA RESECTION      [place] CYSTOURETHROSCOPY N/A 11/29/2017    Procedure: Sonja Berger;  Surgeon: Abe Valverde MD;  [facility];  Service: Urology    [place] CYSTOURETHROSCOPY,FULGUR 2-5 CM LESN N/A 11/29/2017    Procedure: TRANSURETHRAL RESECTION OF BLADDER TUMOR (TURBT); Surgeon: Abe Valverde MD;  [facility];  Service: Urology    [place] CYSTOURETHROSCOPY,FULGUR <0 5 CM LESN N/A 2/27/2019    Procedure: TRANSURETHRAL RESECTION OF BLADDER TUMOR (TURBT); Surgeon: Abe Valverde MD;  [facility];  Service: Urology    LA CYSTOURETHROSCOPY,URETER CATHETER N/A 4/25/2018    Procedure: CYSTOSCOPY WITH RETROGRADE PYELOGRAM;  Surgeon: Abe Valverde MD;  [facility];  Service: Urology    [place] INSTILL ANTICANCER AGENT IN BLADDER N/A 11/29/2017    Procedure: Jewels Hernandez;  Surgeon: Abe Valverde MD;  [facility];  Service: Urology    LA INSTILL ANTICANCER AGENT IN BLADDER N/A 2/27/2019    Procedure: Jewels Hernandez;  Surgeon: Abe Valverde MD;  [facility];  Service: Urology    SKIN BIOPSY      TONSILLECTOMY      TRANSURETHRAL RESECTION OF BLADDER TUMOR N/A 4/25/2018    Procedure: TRANSURETHRAL RESECTION OF BLADDER TUMOR (TURBT);   Surgeon: Abe Valverde MD;  [facility];  Service: Urology     shoulder  Review of Systems   Review of Systems    Active Problem List     Patient Active Problem List   Diagnosis    Malignant neoplasm of posterior wall of urinary bladder ([address] )    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)       Objective   Ht 5' 11" (1 803 m)   BMI 25 94 kg/m²      Physical Exam        Current Medications     Current Outpatient Medications:     Alpha-Lipoic Acid 600 MG CAPS, Take 600 mg by mouth daily  , Disp: , Rfl:     amoxicillin (AMOXIL) 500 MG tablet, Take 500 mg by mouth daily as needed (one hour prior to dental appt), Disp: , Rfl:     B Complex Vitamins (B COMPLEX 50 PO), Take by mouth daily, Disp: , Rfl:     bisoprolol-hydrochlorothiazide (ZIAC) 2 5-6 25 MG per tablet, Take 1 tablet by mouth daily, Disp: , Rfl:     cholecalciferol (VITAMIN D3) 1,000 units tablet, Take 500 Units by mouth daily , Disp: , Rfl:     CINNAMON PO, Take 500 mg by mouth daily  , Disp: , Rfl:     Krill Oil 500 MG CAPS, Take 500 mg by mouth daily  , Disp: , Rfl:     lisinopril (ZESTRIL) 10 mg tablet, Take 10 mg by mouth every evening, Disp: , Rfl:     Magnesium 500 MG TABS, Take 500 mg by mouth daily  , Disp: , Rfl:     Omega 3 1200 MG CAPS, Take 1 capsule by mouth daily, Disp: , Rfl:     Probiotic Product (PROBIOTIC PO), Take 1 capsule by mouth daily, Disp: , Rfl:     Saw Palmetto, Serenoa repens, (SAW PALMETTO BERRY PO), Take by mouth daily, Disp: , Rfl:     sildenafil (VIAGRA) 50 MG tablet, 50 mg daily as needed, Disp: , Rfl: 3    tamsulosin (FLOMAX) 0 4 mg, Take 0 4 mg by mouth daily with dinner, Disp: , Rfl:     Turmeric Curcumin 500 MG CAPS, Take 500 mg by mouth daily  , Disp: , Rfl:         Mihai Murphy MD

## 2020-03-10 ENCOUNTER — APPOINTMENT (OUTPATIENT)
Dept: LAB | Age: 70
End: 2020-03-10
Payer: MEDICARE

## 2020-03-10 ENCOUNTER — TRANSCRIBE ORDERS (OUTPATIENT)
Dept: ADMINISTRATIVE | Facility: HOSPITAL | Age: 70
End: 2020-03-10

## 2020-03-10 DIAGNOSIS — I10 ESSENTIAL (PRIMARY) HYPERTENSION: ICD-10-CM

## 2020-03-10 DIAGNOSIS — E78.49 OTHER HYPERLIPIDEMIA: Primary | ICD-10-CM

## 2020-03-10 DIAGNOSIS — E78.49 OTHER HYPERLIPIDEMIA: ICD-10-CM

## 2020-03-10 LAB
ALBUMIN SERPL BCP-MCNC: 4 G/DL (ref 3.5–5)
ALP SERPL-CCNC: 53 U/L (ref 46–116)
ALT SERPL W P-5'-P-CCNC: 19 U/L (ref 12–78)
ANION GAP SERPL CALCULATED.3IONS-SCNC: 4 MMOL/L (ref 4–13)
AST SERPL W P-5'-P-CCNC: 13 U/L (ref 5–45)
BASOPHILS # BLD AUTO: 0.04 THOUSANDS/ΜL (ref 0–0.1)
BASOPHILS NFR BLD AUTO: 1 % (ref 0–1)
BILIRUB SERPL-MCNC: 1.18 MG/DL (ref 0.2–1)
BUN SERPL-MCNC: 23 MG/DL (ref 5–25)
CALCIUM SERPL-MCNC: 9.2 MG/DL (ref 8.3–10.1)
CHLORIDE SERPL-SCNC: 108 MMOL/L (ref 100–108)
CHOLEST SERPL-MCNC: 199 MG/DL (ref 50–200)
CO2 SERPL-SCNC: 30 MMOL/L (ref 21–32)
CREAT SERPL-MCNC: 1.05 MG/DL (ref 0.6–1.3)
CREAT UR-MCNC: 162 MG/DL
EOSINOPHIL # BLD AUTO: 0.12 THOUSAND/ΜL (ref 0–0.61)
EOSINOPHIL NFR BLD AUTO: 2 % (ref 0–6)
ERYTHROCYTE [DISTWIDTH] IN BLOOD BY AUTOMATED COUNT: 12.6 % (ref 11.6–15.1)
EST. AVERAGE GLUCOSE BLD GHB EST-MCNC: 117 MG/DL
GFR SERPL CREATININE-BSD FRML MDRD: 73 ML/MIN/1.73SQ M
GLUCOSE P FAST SERPL-MCNC: 101 MG/DL (ref 65–99)
HBA1C MFR BLD: 5.7 %
HCT VFR BLD AUTO: 43.9 % (ref 36.5–49.3)
HDLC SERPL-MCNC: 40 MG/DL
HGB BLD-MCNC: 13.8 G/DL (ref 12–17)
IMM GRANULOCYTES # BLD AUTO: 0.01 THOUSAND/UL (ref 0–0.2)
IMM GRANULOCYTES NFR BLD AUTO: 0 % (ref 0–2)
LDLC SERPL CALC-MCNC: 138 MG/DL (ref 0–100)
LYMPHOCYTES # BLD AUTO: 1.49 THOUSANDS/ΜL (ref 0.6–4.47)
LYMPHOCYTES NFR BLD AUTO: 30 % (ref 14–44)
MCH RBC QN AUTO: 29.2 PG (ref 26.8–34.3)
MCHC RBC AUTO-ENTMCNC: 31.4 G/DL (ref 31.4–37.4)
MCV RBC AUTO: 93 FL (ref 82–98)
MICROALBUMIN UR-MCNC: 36.5 MG/L (ref 0–20)
MICROALBUMIN/CREAT 24H UR: 23 MG/G CREATININE (ref 0–30)
MONOCYTES # BLD AUTO: 0.74 THOUSAND/ΜL (ref 0.17–1.22)
MONOCYTES NFR BLD AUTO: 15 % (ref 4–12)
NEUTROPHILS # BLD AUTO: 2.56 THOUSANDS/ΜL (ref 1.85–7.62)
NEUTS SEG NFR BLD AUTO: 52 % (ref 43–75)
NONHDLC SERPL-MCNC: 159 MG/DL
NRBC BLD AUTO-RTO: 0 /100 WBCS
PLATELET # BLD AUTO: 217 THOUSANDS/UL (ref 149–390)
PMV BLD AUTO: 10.7 FL (ref 8.9–12.7)
POTASSIUM SERPL-SCNC: 3.7 MMOL/L (ref 3.5–5.3)
PROT SERPL-MCNC: 7.1 G/DL (ref 6.4–8.2)
RBC # BLD AUTO: 4.73 MILLION/UL (ref 3.88–5.62)
SODIUM SERPL-SCNC: 142 MMOL/L (ref 136–145)
TRIGL SERPL-MCNC: 106 MG/DL
WBC # BLD AUTO: 4.96 THOUSAND/UL (ref 4.31–10.16)

## 2020-03-10 PROCEDURE — 82043 UR ALBUMIN QUANTITATIVE: CPT | Performed by: INTERNAL MEDICINE

## 2020-03-10 PROCEDURE — 80061 LIPID PANEL: CPT

## 2020-03-10 PROCEDURE — 83036 HEMOGLOBIN GLYCOSYLATED A1C: CPT

## 2020-03-10 PROCEDURE — 36415 COLL VENOUS BLD VENIPUNCTURE: CPT

## 2020-03-10 PROCEDURE — 85025 COMPLETE CBC W/AUTO DIFF WBC: CPT

## 2020-03-10 PROCEDURE — 80053 COMPREHEN METABOLIC PANEL: CPT

## 2020-03-10 PROCEDURE — 82570 ASSAY OF URINE CREATININE: CPT | Performed by: INTERNAL MEDICINE

## 2020-07-27 ENCOUNTER — TRANSCRIBE ORDERS (OUTPATIENT)
Dept: URGENT CARE | Age: 70
End: 2020-07-27

## 2020-07-27 ENCOUNTER — APPOINTMENT (OUTPATIENT)
Dept: LAB | Age: 70
End: 2020-07-27
Payer: MEDICARE

## 2020-07-27 DIAGNOSIS — R73.03 DIABETES MELLITUS, LATENT: ICD-10-CM

## 2020-07-27 DIAGNOSIS — E78.49 FAMILIAL COMBINED HYPERLIPIDEMIA: Primary | ICD-10-CM

## 2020-07-27 DIAGNOSIS — I10 ESSENTIAL HYPERTENSION, MALIGNANT: ICD-10-CM

## 2020-07-27 DIAGNOSIS — E78.49 FAMILIAL COMBINED HYPERLIPIDEMIA: ICD-10-CM

## 2020-07-27 LAB
ALBUMIN SERPL BCP-MCNC: 3.9 G/DL (ref 3.5–5)
ALP SERPL-CCNC: 49 U/L (ref 46–116)
ALT SERPL W P-5'-P-CCNC: 22 U/L (ref 12–78)
ANION GAP SERPL CALCULATED.3IONS-SCNC: 7 MMOL/L (ref 4–13)
AST SERPL W P-5'-P-CCNC: 15 U/L (ref 5–45)
BASOPHILS # BLD AUTO: 0.03 THOUSANDS/ΜL (ref 0–0.1)
BASOPHILS NFR BLD AUTO: 0 % (ref 0–1)
BILIRUB SERPL-MCNC: 1.17 MG/DL (ref 0.2–1)
BUN SERPL-MCNC: 15 MG/DL (ref 5–25)
CALCIUM SERPL-MCNC: 9.7 MG/DL (ref 8.3–10.1)
CHLORIDE SERPL-SCNC: 105 MMOL/L (ref 100–108)
CHOLEST SERPL-MCNC: 208 MG/DL (ref 50–200)
CO2 SERPL-SCNC: 26 MMOL/L (ref 21–32)
CREAT SERPL-MCNC: 1.08 MG/DL (ref 0.6–1.3)
EOSINOPHIL # BLD AUTO: 0.02 THOUSAND/ΜL (ref 0–0.61)
EOSINOPHIL NFR BLD AUTO: 0 % (ref 0–6)
ERYTHROCYTE [DISTWIDTH] IN BLOOD BY AUTOMATED COUNT: 13 % (ref 11.6–15.1)
EST. AVERAGE GLUCOSE BLD GHB EST-MCNC: 123 MG/DL
GFR SERPL CREATININE-BSD FRML MDRD: 70 ML/MIN/1.73SQ M
GLUCOSE P FAST SERPL-MCNC: 108 MG/DL (ref 65–99)
HBA1C MFR BLD: 5.9 %
HCT VFR BLD AUTO: 41.6 % (ref 36.5–49.3)
HDLC SERPL-MCNC: 40 MG/DL
HGB BLD-MCNC: 13.6 G/DL (ref 12–17)
IMM GRANULOCYTES # BLD AUTO: 0.02 THOUSAND/UL (ref 0–0.2)
IMM GRANULOCYTES NFR BLD AUTO: 0 % (ref 0–2)
LDLC SERPL CALC-MCNC: 143 MG/DL (ref 0–100)
LYMPHOCYTES # BLD AUTO: 0.73 THOUSANDS/ΜL (ref 0.6–4.47)
LYMPHOCYTES NFR BLD AUTO: 10 % (ref 14–44)
MCH RBC QN AUTO: 29.8 PG (ref 26.8–34.3)
MCHC RBC AUTO-ENTMCNC: 32.7 G/DL (ref 31.4–37.4)
MCV RBC AUTO: 91 FL (ref 82–98)
MONOCYTES # BLD AUTO: 0.71 THOUSAND/ΜL (ref 0.17–1.22)
MONOCYTES NFR BLD AUTO: 10 % (ref 4–12)
NEUTROPHILS # BLD AUTO: 5.48 THOUSANDS/ΜL (ref 1.85–7.62)
NEUTS SEG NFR BLD AUTO: 80 % (ref 43–75)
NONHDLC SERPL-MCNC: 168 MG/DL
NRBC BLD AUTO-RTO: 0 /100 WBCS
PLATELET # BLD AUTO: 226 THOUSANDS/UL (ref 149–390)
PMV BLD AUTO: 10.9 FL (ref 8.9–12.7)
POTASSIUM SERPL-SCNC: 3.5 MMOL/L (ref 3.5–5.3)
PROT SERPL-MCNC: 6.9 G/DL (ref 6.4–8.2)
RBC # BLD AUTO: 4.57 MILLION/UL (ref 3.88–5.62)
SODIUM SERPL-SCNC: 138 MMOL/L (ref 136–145)
TRIGL SERPL-MCNC: 127 MG/DL
WBC # BLD AUTO: 6.99 THOUSAND/UL (ref 4.31–10.16)

## 2020-07-27 PROCEDURE — 83036 HEMOGLOBIN GLYCOSYLATED A1C: CPT

## 2020-07-27 PROCEDURE — 80053 COMPREHEN METABOLIC PANEL: CPT

## 2020-07-27 PROCEDURE — 36415 COLL VENOUS BLD VENIPUNCTURE: CPT | Performed by: INTERNAL MEDICINE

## 2020-07-27 PROCEDURE — 80061 LIPID PANEL: CPT

## 2020-07-27 PROCEDURE — 85025 COMPLETE CBC W/AUTO DIFF WBC: CPT | Performed by: INTERNAL MEDICINE

## 2020-07-31 ENCOUNTER — PROCEDURE VISIT (OUTPATIENT)
Dept: UROLOGY | Facility: MEDICAL CENTER | Age: 70
End: 2020-07-31
Payer: MEDICARE

## 2020-07-31 VITALS — TEMPERATURE: 97.1 F | WEIGHT: 173 LBS | BODY MASS INDEX: 24.22 KG/M2 | HEIGHT: 71 IN

## 2020-07-31 DIAGNOSIS — C67.9 MALIGNANT NEOPLASM OF URINARY BLADDER, UNSPECIFIED SITE (HCC): ICD-10-CM

## 2020-07-31 DIAGNOSIS — Z12.5 ENCOUNTER FOR PROSTATE CANCER SCREENING: Primary | ICD-10-CM

## 2020-07-31 LAB
SL AMB  POCT GLUCOSE, UA: NORMAL
SL AMB LEUKOCYTE ESTERASE,UA: NORMAL
SL AMB POCT BILIRUBIN,UA: NORMAL
SL AMB POCT BLOOD,UA: NORMAL
SL AMB POCT CLARITY,UA: CLEAR
SL AMB POCT COLOR,UA: YELLOW
SL AMB POCT KETONES,UA: NORMAL
SL AMB POCT NITRITE,UA: NORMAL
SL AMB POCT PH,UA: 6
SL AMB POCT SPECIFIC GRAVITY,UA: >=1.03
SL AMB POCT URINE PROTEIN: NORMAL
SL AMB POCT UROBILINOGEN: 0.2

## 2020-07-31 PROCEDURE — 81003 URINALYSIS AUTO W/O SCOPE: CPT | Performed by: UROLOGY

## 2020-07-31 PROCEDURE — 99212 OFFICE O/P EST SF 10 MIN: CPT | Performed by: UROLOGY

## 2020-07-31 PROCEDURE — 52000 CYSTOURETHROSCOPY: CPT | Performed by: UROLOGY

## 2020-07-31 RX ORDER — SULFAMETHOXAZOLE AND TRIMETHOPRIM 800; 160 MG/1; MG/1
1 TABLET ORAL EVERY 12 HOURS SCHEDULED
COMMUNITY
End: 2022-03-25

## 2020-07-31 RX ORDER — OFLOXACIN 3 MG/ML
SOLUTION/ DROPS OPHTHALMIC
COMMUNITY
Start: 2017-04-14 | End: 2022-03-25

## 2020-07-31 NOTE — PROGRESS NOTES
[address] [facility]  [facility]  [address] [facility]  [place], [address] [place] [address]  [phone]  www  [facility]  org      NAME: Sandra Alba  AGE: 72 y o  SEX: male  : 1950   MRN: [id number]    DATE: 2020  TIME: 9:18 AM    Assessment and Plan:    Bladder cancer:  No recurrence on today's cystoscopy, needs 1 more six-month surveillance cystoscopy and if that is negative we can go to yearly  See me in 6 months for cystoscopy  Prostate cancer screening:  Check PSA in August and send him the results  Chief Complaint   No chief complaint on file  History of Present Illness   Bladder cancer:  Status post TURBT of recurrence of bladder cancer 2019-had mitomycin as well   Pathology 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 and that was uninvolved   His previous TURBT was 2018 with a posterior wall tumor and a tumor of low malignant potential at the left ureteral orifice   He underwent 6 weeks BCG after the recurrence  Ken Shah for surveillance cystoscopy  He is overall doing well  Cystoscopy  Date/Time: 2020 9:19 AM  Performed by: Brenda Andino MD  Authorized by: Brenda Andino MD     Procedure details: cystoscopy    Additional Procedure Details: Cystoscopy Procedure Note        Pre-operative Diagnosis:  Bladder cancer, for cystoscopic surveillance    Post-operative Diagnosis:  Same, no recurrence    Procedure: Flexible cystoscopy    Surgeon: Amy Kowalski MD    Anesthesia: 1% Xylocaine per urethra    EBL: Minimal    Complications: none    Procedure Details   The risks, benefits, complications, treatment options, and expected outcomes were discussed with the patient  The patient concurred with the proposed plan, giving informed consent  Cystoscopy was performed today under local anesthesia, using sterile technique   The patient was placed in the supine position, prepped with Betadine, and draped in the usual sterile fashion  The flexible cystocope was used to inspect both the urethra and bladder    Findings:  Urethra:  Normal without stricture  Prostate shows moderate bilobar occlusion  Bladder:  Smooth, not trabeculated and there were no stones tumors or other lesions  The orifices were orthotopic and intact  No recurrence             Specimens:  None                 Complications:  None           Disposition: To home            Condition:  Stable            The following portions of the patient's history were reviewed and updated as appropriate: allergies, current medications, past family history, past medical history, past social history, past surgical history and problem list   Past Medical History:   Diagnosis Date    Arthritis     Bladder tumor     BPH with obstruction/lower urinary tract symptoms     Cancer [facility])     bladder    Cold     11/15 symptoms/to call Dr Gerhardt Carbon if they persist or worsen    Dental crowns present     Elevated PSA     Hematuria, microscopic     History of total left knee replacement     twice one 2011/ and revision 2017    Hyperlipidemia     Hypertension     Irregular heart beat     not on EKGs anymore    Neuropathy     in feet    Prediabetes     Prostatitis, acute 09/18/2017    Seasonal allergies     Tooth missing     Wears glasses      Past Surgical History:   Procedure Laterality Date    CARDIAC CATHETERIZATION      approx 15 yrs ago for irreg heart beat at [address] N/A 2/27/2019    Procedure: CYSTOSCOPY;  Surgeon: Shanique Faria MD;  [facility];  Service: Urology    HERNIA REPAIR Right     inguinal    INSTILLATION MYTOMYCIN N/A 4/25/2018    Procedure: INSTILLATION MITOMYCIN;  Surgeon: Shanique Faria MD;  [facility];  Service: Urology    JOINT REPLACEMENT Left     knee    KNEE ARTHROSCOPY Left 1991    ACL    LIPOMA RESECTION      [place] CYSTOURETHROSCOPY N/A 11/29/2017    Procedure: CYSTOSCOPY;  Surgeon: Tamie Schroeder MD;  [facility];  Service: Urology    [place] CYSTOURETHROSCOPY,FULGUR 2-5 CM LESN N/A 11/29/2017    Procedure: TRANSURETHRAL RESECTION OF BLADDER TUMOR (TURBT); Surgeon: Tamie Schroeder MD;  [facility];  Service: Urology    [place] CYSTOURETHROSCOPY,FULGUR <0 5 CM LESN N/A 2/27/2019    Procedure: TRANSURETHRAL RESECTION OF BLADDER TUMOR (TURBT); Surgeon: Tamie Schroeder MD;  [facility];  Service: Urology    [place] CYSTOURETHROSCOPY,URETER CATHETER N/A 4/25/2018    Procedure: CYSTOSCOPY WITH RETROGRADE PYELOGRAM;  Surgeon: Tamie Schroeder MD;  [facility];  Service: Urology    [place] INSTILL ANTICANCER AGENT IN BLADDER N/A 11/29/2017    Procedure: Gregory Garcia;  Surgeon: Tamie Schroeder MD;  [facility];  Service: Urology    [place] INSTILL ANTICANCER AGENT IN BLADDER N/A 2/27/2019    Procedure: Gregory Garcia;  Surgeon: Tamie Schroeder MD;  [facility];  Service: Urology    SKIN BIOPSY      TONSILLECTOMY      TRANSURETHRAL RESECTION OF BLADDER TUMOR N/A 4/25/2018    Procedure: TRANSURETHRAL RESECTION OF BLADDER TUMOR (TURBT); Surgeon: Tamie Schroeder MD;  [facility];  Service: Urology     shoulder  Review of Systems   Review of Systems    Active Problem List     Patient Active Problem List   Diagnosis    Malignant neoplasm of posterior wall of urinary bladder (HCC)    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)       Objective   Temp (!) 97 1 °F (36 2 °C)   Ht 5' 11" (1 803 m)   Wt 78 5 kg (173 lb)   BMI 24 13 kg/m²     Physical Exam   Constitutional: He is oriented to person, place, and time  He appears well-developed and well-nourished  HENT:   Head: Normocephalic and atraumatic  Eyes: EOM are normal    Neck: Normal range of motion  Pulmonary/Chest: Effort normal    Genitourinary: Penis normal    Musculoskeletal: Normal range of motion  Neurological: He is alert and oriented to person, place, and time  Psychiatric: He has a normal mood and affect   His behavior is normal  Judgment and thought content normal            Current Medications     Current Outpatient Medications:     ofloxacin (OCUFLOX) 0 3 % ophthalmic solution, Apply to eye, Disp: , Rfl:     Alpha-Lipoic Acid 600 MG CAPS, Take 600 mg by mouth daily  , Disp: , Rfl:     amoxicillin (AMOXIL) 500 MG tablet, Take 500 mg by mouth daily as needed (one hour prior to dental appt), Disp: , Rfl:     B Complex Vitamins (B COMPLEX 50 PO), Take by mouth daily, Disp: , Rfl:     bisoprolol-hydrochlorothiazide (ZIAC) 2 5-6 25 MG per tablet, Take 1 tablet by mouth daily, Disp: , Rfl:     cholecalciferol (VITAMIN D3) 1,000 units tablet, Take 500 Units by mouth daily , Disp: , Rfl:     CINNAMON PO, Take 500 mg by mouth daily  , Disp: , Rfl:     Krill Oil 500 MG CAPS, Take 500 mg by mouth daily  , Disp: , Rfl:     lisinopril (ZESTRIL) 10 mg tablet, Take 10 mg by mouth every evening, Disp: , Rfl:     Magnesium 500 MG TABS, Take 500 mg by mouth daily  , Disp: , Rfl:     Omega 3 1200 MG CAPS, Take 1 capsule by mouth daily, Disp: , Rfl:     Probiotic Product (PROBIOTIC PO), Take 1 capsule by mouth daily, Disp: , Rfl:     Saw Palmetto, Serenoa repens, (SAW PALMETTO BERRY PO), Take by mouth daily, Disp: , Rfl:     sildenafil (VIAGRA) 50 MG tablet, 50 mg daily as needed, Disp: , Rfl: 3    tamsulosin (FLOMAX) 0 4 mg, Take 0 4 mg by mouth daily with dinner, Disp: , Rfl:     Turmeric Curcumin 500 MG CAPS, Take 500 mg by mouth daily  , Disp: , Rfl:         Chuy Morales MD

## 2020-07-31 NOTE — LETTER
2020     Leonard Guerrero MD  [address]    Patient: Yaa Dickey   YOB: 1950   Date of Visit: 2020       Dear Dr Mk Braun: Thank you for referring Dunia Teague to me for evaluation  Below are my notes for this consultation  If you have questions, please do not hesitate to call me  I look forward to following your patient along with you  Sincerely,        Porter Ramirez MD        CC: No Recipients  Porter Ramirez MD  2020  9:21 AM  Sign at close encounter  [address] [facility]  [address], [address] [place] [address]  [phone]  www  [facility]  org      NAME: Yaa Dickey  AGE: 72 y o  SEX: male  : 1950   MRN: [id number]    DATE: 2020  TIME: 9:18 AM    Assessment and Plan               Chief Complaint   No chief complaint on file  History of Present Illness   Bladder cancer:  Status post TURBT of recurrence of bladder cancer 2019-had mitomycin as well   Pathology 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 and that was uninvolved   His previous TURBT was 2018 with a posterior wall tumor and a tumor of low malignant potential at the left ureteral orifice   He underwent 6 weeks BCG after the recurrence  [facility] for surveillance cystoscopy      Cystoscopy  Date/Time: 2020 9:19 AM  Performed by: Porter Ramirez MD  Authorized by: Porter Ramirez MD     Procedure details: cystoscopy    Additional Procedure Details: Cystoscopy Procedure Note        Pre-operative Diagnosis:  Bladder cancer, for cystoscopic surveillance    Post-operative Diagnosis:  Same    Procedure: Flexible cystoscopy    Surgeon: Juve Feliciano MD    Anesthesia: 1% Xylocaine per urethra    EBL: Minimal    Complications: none    Procedure Details   The risks, benefits, complications, treatment options, and expected outcomes were discussed with the patient  The patient concurred with the proposed plan, giving informed consent  Cystoscopy was performed today under local anesthesia, using sterile technique  The patient was placed in the supine position, prepped with Betadine, and draped in the usual sterile fashion  The flexible cystocope was used to inspect both the urethra and bladder    Findings:  Urethra:    Bladder:  Smooth, not trabeculated and there were no stones tumors or other lesions  The orifices were orthotopic and intact             Specimens:  None                 Complications:  None           Disposition: To home            Condition:  Stable            The following portions of the patient's history were reviewed and updated as appropriate: allergies, current medications, past family history, past medical history, past social history, past surgical history and problem list   Past Medical History:   Diagnosis Date    Arthritis     Bladder tumor     BPH with obstruction/lower urinary tract symptoms     Cancer [facility])     bladder    Cold     11/15 symptoms/to call Dr Jim Murguia if they persist or worsen    Dental crowns present     Elevated PSA     Hematuria, microscopic     History of total left knee replacement     twice one 2011/ and revision 2017    Hyperlipidemia     Hypertension     Irregular heart beat     not on EKGs anymore    Neuropathy     in feet    Prediabetes     Prostatitis, acute 09/18/2017    Seasonal allergies     Tooth missing     Wears glasses      Past Surgical History:   Procedure Laterality Date    CARDIAC CATHETERIZATION      approx 15 yrs ago for irreg heart beat at [address] N/A 2/27/2019    Procedure: CYSTOSCOPY;  Surgeon: Kathy Bowles MD;  [facility];  Service: Urology    HERNIA REPAIR Right     inguinal    INSTILLATION MYTOMYCIN N/A 4/25/2018    Procedure: INSTILLATION MITOMYCIN;  Surgeon: Kathy Bowles MD;  [facility];  Service: Urology  JOINT REPLACEMENT Left     knee    KNEE ARTHROSCOPY Left 1991    ACL    LIPOMA RESECTION      [place] CYSTOURETHROSCOPY N/A 11/29/2017    Procedure: CYSTOSCOPY;  Surgeon: Delphine Chavez MD;  [facility];  Service: Urology    WA CYSTOURETHROSCOPY,FULGUR 2-5 CM LESN N/A 11/29/2017    Procedure: TRANSURETHRAL RESECTION OF BLADDER TUMOR (TURBT); Surgeon: Delphine Chavez MD;  [facility];  Service: Urology    WA CYSTOURETHROSCOPY,FULGUR <0 5 CM LESN N/A 2/27/2019    Procedure: TRANSURETHRAL RESECTION OF BLADDER TUMOR (TURBT); Surgeon: Delphine Chavez MD;  [facility];  Service: Urology    WA CYSTOURETHROSCOPY,URETER CATHETER N/A 4/25/2018    Procedure: CYSTOSCOPY WITH RETROGRADE PYELOGRAM;  Surgeon: Delphine Chavez MD;  [facility];  Service: Urology    WA INSTILL ANTICANCER AGENT IN BLADDER N/A 11/29/2017    Procedure: Matt Flores;  Surgeon: Delphine Chavez MD;  [facility];  Service: Urology    WA INSTILL ANTICANCER AGENT IN BLADDER N/A 2/27/2019    Procedure: Matt Flores;  Surgeon: Delphine Chavez MD;  [facility];  Service: Urology    SKIN BIOPSY      TONSILLECTOMY      TRANSURETHRAL RESECTION OF BLADDER TUMOR N/A 4/25/2018    Procedure: TRANSURETHRAL RESECTION OF BLADDER TUMOR (TURBT);   Surgeon: Delphine Chavez MD;  [facility];  Service: Urology     shoulder  Review of Systems   Review of Systems    Active Problem List     Patient Active Problem List   Diagnosis    Malignant neoplasm of posterior wall of urinary bladder (HCC)    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)       Objective   Temp (!) 97 1 °F (36 2 °C)   Ht 5' 11" (1 803 m)   Wt 78 5 kg (173 lb)   BMI 24 13 kg/m²      Physical Exam        Current Medications     Current Outpatient Medications:     ofloxacin (OCUFLOX) 0 3 % ophthalmic solution, Apply to eye, Disp: , Rfl:     Alpha-Lipoic Acid 600 MG CAPS, Take 600 mg by mouth daily  , Disp: , Rfl:    amoxicillin (AMOXIL) 500 MG tablet, Take 500 mg by mouth daily as needed (one hour prior to dental appt), Disp: , Rfl:     B Complex Vitamins (B COMPLEX 50 PO), Take by mouth daily, Disp: , Rfl:     bisoprolol-hydrochlorothiazide (ZIAC) 2 5-6 25 MG per tablet, Take 1 tablet by mouth daily, Disp: , Rfl:     cholecalciferol (VITAMIN D3) 1,000 units tablet, Take 500 Units by mouth daily , Disp: , Rfl:     CINNAMON PO, Take 500 mg by mouth daily  , Disp: , Rfl:     Krill Oil 500 MG CAPS, Take 500 mg by mouth daily  , Disp: , Rfl:     lisinopril (ZESTRIL) 10 mg tablet, Take 10 mg by mouth every evening, Disp: , Rfl:     Magnesium 500 MG TABS, Take 500 mg by mouth daily  , Disp: , Rfl:     Omega 3 1200 MG CAPS, Take 1 capsule by mouth daily, Disp: , Rfl:     Probiotic Product (PROBIOTIC PO), Take 1 capsule by mouth daily, Disp: , Rfl:     Saw Palmetto, Serenoa repens, (SAW PALMETTO BERRY PO), Take by mouth daily, Disp: , Rfl:     sildenafil (VIAGRA) 50 MG tablet, 50 mg daily as needed, Disp: , Rfl: 3    tamsulosin (FLOMAX) 0 4 mg, Take 0 4 mg by mouth daily with dinner, Disp: , Rfl:     Turmeric Curcumin 500 MG CAPS, Take 500 mg by mouth daily  , Disp: , Rfl:         Kasey Wynne MD

## 2020-08-28 ENCOUNTER — LAB (OUTPATIENT)
Dept: LAB | Age: 70
End: 2020-08-28
Payer: MEDICARE

## 2020-08-28 DIAGNOSIS — Z12.5 ENCOUNTER FOR PROSTATE CANCER SCREENING: ICD-10-CM

## 2020-08-28 LAB — PSA SERPL-MCNC: 3.1 NG/ML (ref 0–4)

## 2020-08-28 PROCEDURE — G0103 PSA SCREENING: HCPCS

## 2020-08-28 PROCEDURE — 36415 COLL VENOUS BLD VENIPUNCTURE: CPT

## 2020-08-31 ENCOUNTER — TELEPHONE (OUTPATIENT)
Dept: UROLOGY | Facility: MEDICAL CENTER | Age: 70
End: 2020-08-31

## 2020-08-31 NOTE — TELEPHONE ENCOUNTER
----- Message from Viona Peabody, MD sent at 8/31/2020  9:21 AM EDT -----  Please inform patient that the results are normal

## 2021-01-29 ENCOUNTER — PROCEDURE VISIT (OUTPATIENT)
Dept: UROLOGY | Facility: MEDICAL CENTER | Age: 71
End: 2021-01-29
Payer: MEDICARE

## 2021-01-29 VITALS — WEIGHT: 178 LBS | HEIGHT: 71 IN | BODY MASS INDEX: 24.92 KG/M2

## 2021-01-29 DIAGNOSIS — C67.9 MALIGNANT NEOPLASM OF URINARY BLADDER, UNSPECIFIED SITE (HCC): Primary | ICD-10-CM

## 2021-01-29 DIAGNOSIS — Z12.5 ENCOUNTER FOR PROSTATE CANCER SCREENING: ICD-10-CM

## 2021-01-29 LAB
SL AMB  POCT GLUCOSE, UA: NORMAL
SL AMB LEUKOCYTE ESTERASE,UA: NORMAL
SL AMB POCT BILIRUBIN,UA: NORMAL
SL AMB POCT BLOOD,UA: NORMAL
SL AMB POCT CLARITY,UA: CLEAR
SL AMB POCT COLOR,UA: YELLOW
SL AMB POCT KETONES,UA: NORMAL
SL AMB POCT NITRITE,UA: NORMAL
SL AMB POCT PH,UA: 5.5
SL AMB POCT SPECIFIC GRAVITY,UA: >=1.03
SL AMB POCT URINE PROTEIN: NORMAL
SL AMB POCT UROBILINOGEN: 0.2

## 2021-01-29 PROCEDURE — 52000 CYSTOURETHROSCOPY: CPT | Performed by: UROLOGY

## 2021-01-29 PROCEDURE — 81003 URINALYSIS AUTO W/O SCOPE: CPT | Performed by: UROLOGY

## 2021-01-29 PROCEDURE — 99213 OFFICE O/P EST LOW 20 MIN: CPT | Performed by: UROLOGY

## 2021-01-29 RX ORDER — CIPROFLOXACIN 500 MG/1
500 TABLET, FILM COATED ORAL ONCE
Qty: 1 TABLET | Refills: 0 | Status: SHIPPED | OUTPATIENT
Start: 2021-01-29 | End: 2021-01-29

## 2021-01-29 NOTE — PROGRESS NOTES
[address] [facility]  [facility]  [address] [facility]  [place], [address] [place] [address]  [phone]  www  [facility]  org      NAME: Sunni Conley  AGE: 72 y o  SEX: male  : 1950   MRN: [id number]    DATE: 2021  TIME: 9:08 AM    Assessment and Plan:      Bladder cancer with last recurrence 2019- disease free at 2 years  Repeat cystoscopy in 1 year  Prostate cancer screening:  Check PSA in August and send him the results  Chief Complaint   No chief complaint on file  History of Present Illness   Bladder cancer:  Status post TURBT of recurrence of bladder cancer 2019-had mitomycin as well   Pathology 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 and that was uninvolved   His previous TURBT was 2018 with a posterior wall tumor and a tumor of low malignant potential at the left ureteral orifice   He underwent 6 weeks BCG after the recurrence  [facility] for surveillance cystoscopy  He is overall doing well    prostate cancer screening:  PSA 3 1 2020  Cystoscopy     Date/Time 2021 9:09 AM     Performed by  Esther Lino MD     Authorized by Esther Lino MD      Universal Protocol:  Consent: Verbal consent obtained  Written consent obtained  Procedure Details:  Procedure type: cystoscopy    Additional Procedure Details: Cystoscopy Procedure Note        Pre-operative Diagnosis:    Bladder cancer surveillance    Post-operative Diagnosis:  Same, no recurrence    Procedure: Flexible cystoscopy    Surgeon: Finn Velazco MD    Anesthesia: 1% Xylocaine per urethra    EBL: Minimal    Complications: none    Procedure Details   The risks, benefits, complications, treatment options, and expected outcomes were discussed with the patient  The patient concurred with the proposed plan, giving informed consent  Cystoscopy was performed today under local anesthesia, using sterile technique   The patient was placed in the supine position, prepped with Betadine, and draped in the usual sterile fashion  The flexible cystocope was used to inspect both the urethra and bladder    Findings:  Urethra: normal without stricture  Prostate  Moderately obstructive  Bladder:  Smooth, not trabeculated and there were no stones tumors or other lesions  The orifices were orthotopic and intact  No recurrence             Specimens: None                 Complications:  None           Disposition: To home            Condition:  Stable          The following portions of the patient's history were reviewed and updated as appropriate: allergies, current medications, past family history, past medical history, past social history, past surgical history and problem list   Past Medical History:   Diagnosis Date    Arthritis     Bladder tumor     BPH with obstruction/lower urinary tract symptoms     Cancer [facility])     bladder    Cold     11/15 symptoms/to call Dr Colby De Paz if they persist or worsen    Dental crowns present     Elevated PSA     Hematuria, microscopic     History of total left knee replacement     twice one 2011/ and revision 2017    Hyperlipidemia     Hypertension     Irregular heart beat     not on EKGs anymore    Neuropathy     in feet    Prediabetes     Prostatitis, acute 09/18/2017    Seasonal allergies     Tooth missing     Wears glasses      Past Surgical History:   Procedure Laterality Date    CARDIAC CATHETERIZATION      approx 15 yrs ago for irreg heart beat at [address] N/A 2/27/2019    Procedure: CYSTOSCOPY;  Surgeon: Silvia Zavala MD;  [facility];  Service: Urology    HERNIA REPAIR Right     inguinal    INSTILLATION MYTOMYCIN N/A 4/25/2018    Procedure: INSTILLATION MITOMYCIN;  Surgeon: Silvia Zavala MD;  [facility];  Service: Urology    JOINT REPLACEMENT Left     knee    KNEE ARTHROSCOPY Left 1991    ACL    LIPOMA RESECTION  [place] CYSTOURETHROSCOPY N/A 11/29/2017    Procedure: Naty Money;  Surgeon: Dusty Kwong MD;  [facility];  Service: Urology    [place] CYSTOURETHROSCOPY,FULGUR 2-5 CM LESN N/A 11/29/2017    Procedure: TRANSURETHRAL RESECTION OF BLADDER TUMOR (TURBT); Surgeon: Dusty Kwong MD;  [facility];  Service: Urology    AK CYSTOURETHROSCOPY,FULGUR <0 5 CM LESN N/A 2/27/2019    Procedure: TRANSURETHRAL RESECTION OF BLADDER TUMOR (TURBT); Surgeon: Dusty Kwong MD;  [facility];  Service: Urology    AK CYSTOURETHROSCOPY,URETER CATHETER N/A 4/25/2018    Procedure: CYSTOSCOPY WITH RETROGRADE PYELOGRAM;  Surgeon: Dusty Kwong MD;  [facility];  Service: Urology    AK INSTILL ANTICANCER AGENT IN BLADDER N/A 11/29/2017    Procedure: Marla Tinajero;  Surgeon: Dusty Kwong MD;  [facility];  Service: Urology    AK INSTILL ANTICANCER AGENT IN BLADDER N/A 2/27/2019    Procedure: Marla Tinajero;  Surgeon: Dusty Kwong MD;  [facility];  Service: Urology    SKIN BIOPSY      TONSILLECTOMY      TRANSURETHRAL RESECTION OF BLADDER TUMOR N/A 4/25/2018    Procedure: TRANSURETHRAL RESECTION OF BLADDER TUMOR (TURBT); Surgeon: Dusty Kwong MD;  [facility];  Service: Urology     shoulder  Review of Systems   Review of Systems   Constitutional: Negative for fever  Respiratory: Negative for shortness of breath  Cardiovascular: Negative for chest pain  Genitourinary: Negative  Active Problem List     Patient Active Problem List   Diagnosis    Malignant neoplasm of posterior wall of urinary bladder ([address] )    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)       Objective   There were no vitals taken for this visit  Physical Exam  Constitutional:       Appearance: Normal appearance  He is normal weight  HENT:      Head: Normocephalic and atraumatic  Eyes:      Extraocular Movements: Extraocular movements intact     Neck:      Musculoskeletal: Normal range of motion  Cardiovascular:      Rate and Rhythm: Normal rate  Pulmonary:      Effort: Pulmonary effort is normal    Genitourinary:     Penis: Normal        Prostate: Normal       Rectum: Normal       Comments: Rectal exam reveals normal tone, no masses and his prostate is about 50-60 g, smooth symmetric benign  No nodules  Musculoskeletal: Normal range of motion  Skin:     Coloration: Skin is not jaundiced or pale  Neurological:      General: No focal deficit present  Mental Status: He is alert and oriented to person, place, and time  Psychiatric:         Mood and Affect: Mood normal          Behavior: Behavior normal          Thought Content:  Thought content normal          Judgment: Judgment normal              Current Medications     Current Outpatient Medications:     Alpha-Lipoic Acid 600 MG CAPS, Take 600 mg by mouth daily  , Disp: , Rfl:     amoxicillin (AMOXIL) 500 MG tablet, Take 500 mg by mouth daily as needed (one hour prior to dental appt), Disp: , Rfl:     B Complex Vitamins (B COMPLEX 50 PO), Take by mouth daily, Disp: , Rfl:     bisoprolol-hydrochlorothiazide (ZIAC) 2 5-6 25 MG per tablet, Take 1 tablet by mouth daily, Disp: , Rfl:     cholecalciferol (VITAMIN D3) 1,000 units tablet, Take 500 Units by mouth daily , Disp: , Rfl:     CINNAMON PO, Take 500 mg by mouth daily  , Disp: , Rfl:     Krill Oil 500 MG CAPS, Take 500 mg by mouth daily  , Disp: , Rfl:     lisinopril (ZESTRIL) 10 mg tablet, Take 10 mg by mouth every evening, Disp: , Rfl:     Magnesium 500 MG TABS, Take 500 mg by mouth daily  , Disp: , Rfl:     Multiple Vitamin (MULTI-VITAMIN DAILY PO), Take by mouth, Disp: , Rfl:     ofloxacin (OCUFLOX) 0 3 % ophthalmic solution, Apply to eye, Disp: , Rfl:     Omega 3 1200 MG CAPS, Take 1 capsule by mouth daily, Disp: , Rfl:     Probiotic Product (ACIDOPHILUS PROBIOTIC BLEND PO), Take by mouth, Disp: , Rfl:     Probiotic Product (PROBIOTIC PO), Take 1 capsule by mouth daily, Disp: , Rfl:     Saw Palmetto, Serenoa repens, (SAW PALMETTO BERRY PO), Take by mouth daily, Disp: , Rfl:     sildenafil (VIAGRA) 50 MG tablet, 50 mg daily as needed, Disp: , Rfl: 3    sulfamethoxazole-trimethoprim (BACTRIM DS) 800-160 mg per tablet, Take 1 tablet by mouth every 12 (twelve) hours, Disp: , Rfl:     tamsulosin (FLOMAX) 0 4 mg, Take 0 4 mg by mouth daily with dinner, Disp: , Rfl:     Turmeric Curcumin 500 MG CAPS, Take 500 mg by mouth daily  , Disp: , Rfl:         Niki Cain MD

## 2021-03-10 DIAGNOSIS — Z23 ENCOUNTER FOR IMMUNIZATION: ICD-10-CM

## 2021-04-05 NOTE — PATIENT INSTRUCTIONS
the prescription for antibiotic from your pharmacy, and take 1 pill now  Take 1 pill before each procedure  There was a total of 6 pills prescribed  Call us when you need refills of tamsulosin 
[Negative] : Heme/Lymph

## 2021-09-20 ENCOUNTER — APPOINTMENT (OUTPATIENT)
Dept: LAB | Age: 71
End: 2021-09-20
Payer: MEDICARE

## 2021-09-20 DIAGNOSIS — Z12.5 ENCOUNTER FOR PROSTATE CANCER SCREENING: ICD-10-CM

## 2021-09-20 LAB — PSA SERPL-MCNC: 2.8 NG/ML (ref 0–4)

## 2021-09-20 PROCEDURE — 36415 COLL VENOUS BLD VENIPUNCTURE: CPT

## 2021-09-20 PROCEDURE — G0103 PSA SCREENING: HCPCS

## 2022-02-07 ENCOUNTER — TELEPHONE (OUTPATIENT)
Dept: OTHER | Facility: OTHER | Age: 72
End: 2022-02-07

## 2022-02-07 NOTE — TELEPHONE ENCOUNTER
Patient calling- notes someone from the Urology office was trying to get a hold of him  Notes he left the incorrect cell number for a call back  Please call him at [phone] as his wife is on the home phone

## 2022-02-07 NOTE — TELEPHONE ENCOUNTER
Called pt back and left message re sharif PB appt from 2/11  Scheduled 3/25/22 for cysto and sent let to pt

## 2022-02-07 NOTE — TELEPHONE ENCOUNTER
Pt called stating he had a missed call from the office and wanted to see what it was about as there was no lvm, informed him I would follow up with office

## 2022-03-10 ENCOUNTER — OFFICE VISIT (OUTPATIENT)
Dept: URGENT CARE | Age: 72
End: 2022-03-10
Payer: MEDICARE

## 2022-03-10 ENCOUNTER — APPOINTMENT (OUTPATIENT)
Dept: RADIOLOGY | Age: 72
End: 2022-03-10
Payer: MEDICARE

## 2022-03-10 VITALS
HEART RATE: 72 BPM | DIASTOLIC BLOOD PRESSURE: 70 MMHG | HEIGHT: 70 IN | SYSTOLIC BLOOD PRESSURE: 137 MMHG | TEMPERATURE: 97.4 F | OXYGEN SATURATION: 97 % | RESPIRATION RATE: 17 BRPM | BODY MASS INDEX: 25.48 KG/M2 | WEIGHT: 178 LBS

## 2022-03-10 DIAGNOSIS — S69.92XA FINGER INJURY, LEFT, INITIAL ENCOUNTER: ICD-10-CM

## 2022-03-10 DIAGNOSIS — S61.217A LACERATION OF LEFT LITTLE FINGER WITHOUT FOREIGN BODY WITHOUT DAMAGE TO NAIL, INITIAL ENCOUNTER: Primary | ICD-10-CM

## 2022-03-10 PROCEDURE — 99213 OFFICE O/P EST LOW 20 MIN: CPT | Performed by: PHYSICIAN ASSISTANT

## 2022-03-10 PROCEDURE — 73130 X-RAY EXAM OF HAND: CPT

## 2022-03-10 PROCEDURE — G0463 HOSPITAL OUTPT CLINIC VISIT: HCPCS | Performed by: PHYSICIAN ASSISTANT

## 2022-03-10 PROCEDURE — 12001 RPR S/N/AX/GEN/TRNK 2.5CM/<: CPT | Performed by: PHYSICIAN ASSISTANT

## 2022-03-10 RX ORDER — CEPHALEXIN 500 MG/1
500 CAPSULE ORAL EVERY 12 HOURS SCHEDULED
Qty: 14 CAPSULE | Refills: 0 | Status: SHIPPED | OUTPATIENT
Start: 2022-03-10 | End: 2022-03-17

## 2022-03-10 NOTE — PROGRESS NOTES
[employer]        NAME: Julienne Martin is a 80 y o  male  : 1950    MRN: [id number]  DATE: March 10, 2022  TIME: 6:10 PM    Assessment and Plan   Laceration of left little finger without foreign body without damage to nail, initial encounter [S61 [id number]]  1  Laceration of left little finger without foreign body without damage to nail, initial encounter  XR hand 3+ vw left    cephalexin (KEFLEX) 500 mg capsule   Patient presents with circular saw injury to left thumb recommend x-ray to rule out bony involvement  X-ray demonstrates no acute fractures or dislocations there is no nicking to the bone indicating that this all did not reach this deep  Laceration repaired as outlined below  Patient will be started on Keflex as this is considered a semi dirty wound  Discussed skin glue care  Discussed signs of infection and when to report back or to the emergency room  Patient Instructions     Patient Instructions   Your laceration was repaired today  Leave dressing in place x 24 hours  Keep wound clean and dry  Wash gently with soap and water as needed, do not submerge or soak as this will delay healing and increase risk of infection  Take antibiotics as directed until complete  Use over the counter ibuprofen and/or tylenol as directed on the bottle as needed for pain  Skin Adhesive Care   WHAT YOU NEED TO KNOW:   Skin adhesive is medical glue used to close wounds  It is a substitute for staples and stitches  Skin adhesive wound closures take less time and do not require anesthesia  You have less pain and a lower risk of infection than with staples or stitches  Skin adhesive will fall off after the wound is healed  DISCHARGE INSTRUCTIONS:   Self-care:   · Keep your wound clean and dry  for 1 to 5 days  You can shower 24 hours after the skin adhesive is applied  Lightly pat your wound dry after you shower  · Do not soak  your wound in water, such as in a bath or hot tub      · Do not scrub  your wound or pick at the adhesive  This can make your wound reopen  · Do not apply ointments  to your wound  These include antibiotic and other ointments that contain petroleum jelly  These products will remove skin adhesive and reopen your wound  Follow up with your doctor as directed:  Write down your questions so you remember to ask them during your visits  Contact your healthcare provider if:   · You have a fever  · Your wound is red and warm to touch  · You have questions or concerns about your condition or care  Seek care immediately if:   · Your wound has fluid draining from it  · Your wound opens  © Copyright [employer] 2022 Information is for End User's use only and may not be sold, redistributed or otherwise used for commercial purposes  All illustrations and images included in CareNotes® are the copyrighted property of A D A M , Inc  or [facility] Yvonne Man   The above information is an  only  It is not intended as medical advice for individual conditions or treatments  Talk to your doctor, nurse or pharmacist before following any medical regimen to see if it is safe and effective for you  Follow up with PCP in 3-5 days  Proceed to  ER if symptoms worsen  Chief Complaint     Chief Complaint   Patient presents with    Laceration     Cut L pinky w/ circular saw approx 1445  >1inch lac lat portion finger, +sensation +movement         History of Present Illness       80year old male presents with complaint of laceration to left small finger  Patient states that he was using a circular saw to cut a piece of wood repair a bird house when the sought cut and the wood kicked back and he ended up slicing his finger  Patient reports as avulsion type injury that he does not think can be repaired  Patient's tetanus was updated in November 2021  He has no other concerns or complaints today        Review of Systems   Review of Systems   Skin: Positive for wound           Current Medications       Current Outpatient Medications:     Alpha-Lipoic Acid 600 MG CAPS, Take 600 mg by mouth daily  , Disp: , Rfl:     amoxicillin (AMOXIL) 500 MG tablet, Take 500 mg by mouth daily as needed (one hour prior to dental appt), Disp: , Rfl:     bisoprolol-hydrochlorothiazide (ZIAC) 2 5-6 25 MG per tablet, Take 1 tablet by mouth daily, Disp: , Rfl:     cholecalciferol (VITAMIN D3) 1,000 units tablet, Take 500 Units by mouth daily , Disp: , Rfl:     CINNAMON PO, Take 500 mg by mouth daily  , Disp: , Rfl:     Multiple Vitamin (MULTI-VITAMIN DAILY PO), Take by mouth, Disp: , Rfl:     Omega 3 1200 MG CAPS, Take 1 capsule by mouth daily, Disp: , Rfl:     Probiotic Product (ACIDOPHILUS PROBIOTIC BLEND PO), Take by mouth, Disp: , Rfl:     Probiotic Product (PROBIOTIC PO), Take 1 capsule by mouth daily, Disp: , Rfl:     Saw Palmetto, Serenoa repens, (SAW PALMETTO BERRY PO), Take by mouth daily, Disp: , Rfl:     sildenafil (VIAGRA) 50 MG tablet, 50 mg daily as needed, Disp: , Rfl: 3    B Complex Vitamins (B COMPLEX 50 PO), Take by mouth daily (Patient not taking: Reported on 3/10/2022 ), Disp: , Rfl:     cephalexin (KEFLEX) 500 mg capsule, Take 1 capsule (500 mg total) by mouth every 12 (twelve) hours for 7 days, Disp: 14 capsule, Rfl: 0    Krill Oil 500 MG CAPS, Take 500 mg by mouth daily   (Patient not taking: Reported on 3/10/2022 ), Disp: , Rfl:     lisinopril (ZESTRIL) 10 mg tablet, Take 10 mg by mouth every evening (Patient not taking: Reported on 3/10/2022 ), Disp: , Rfl:     Magnesium 500 MG TABS, Take 500 mg by mouth daily   (Patient not taking: Reported on 3/10/2022 ), Disp: , Rfl:     ofloxacin (OCUFLOX) 0 3 % ophthalmic solution, Apply to eye (Patient not taking: Reported on 3/10/2022 ), Disp: , Rfl:     sulfamethoxazole-trimethoprim (BACTRIM DS) 800-160 mg per tablet, Take 1 tablet by mouth every 12 (twelve) hours (Patient not taking: Reported on 3/10/2022 ), Disp: , Rfl:     tamsulosin (FLOMAX) 0 4 mg, Take 0 4 mg by mouth daily with dinner (Patient not taking: Reported on 3/10/2022 ), Disp: , Rfl:     Turmeric Curcumin 500 MG CAPS, Take 500 mg by mouth daily   (Patient not taking: Reported on 3/10/2022 ), Disp: , Rfl:     Current Allergies     Allergies as of 03/10/2022    (No Known Allergies)            The following portions of the patient's history were reviewed and updated as appropriate: allergies, current medications, past family history, past medical history, past social history, past surgical history and problem list      Past Medical History:   Diagnosis Date    Arthritis     Bladder tumor     BPH with obstruction/lower urinary tract symptoms     Cancer [facility])     bladder    Cold     11/15 symptoms/to call Dr Karlene Kay if they persist or worsen    Dental crowns present     Elevated PSA     Hematuria, microscopic     History of total left knee replacement     twice one 2011/ and revision 2017    Hyperlipidemia     Hypertension     Irregular heart beat     not on EKGs anymore    Neuropathy     in feet    Prediabetes     Prostatitis, acute 09/18/2017    Seasonal allergies     Tooth missing     Wears glasses        Past Surgical History:   Procedure Laterality Date    CARDIAC CATHETERIZATION      approx 15 yrs ago [facility] irreg heart beat at [facility]    COLONOSCOPY      CYSTOSCOPY      CYSTOSCOPY N/A 2/27/2019    Procedure: CYSTOSCOPY;  Surgeon: Laurel Rico MD;  [facility];  Service: Urology    HERNIA REPAIR Right     inguinal    INSTILLATION MYTOMYCIN N/A 4/25/2018    Procedure: INSTILLATION MITOMYCIN;  Surgeon: Laurel Rico MD;  [facility];  Service: Urology    JOINT REPLACEMENT Left     knee    KNEE ARTHROSCOPY Left 1991    ACL    LIPOMA RESECTION      MT CYSTOURETHROSCOPY N/A 11/29/2017    Procedure: CYSTOSCOPY;  Surgeon: Laurel Rico MD;  [facility];  Service: Urology    MT CYSTOURETHROSCOPY,FULGUR 2-5 CM LESN N/A 11/29/2017    Procedure: TRANSURETHRAL RESECTION OF BLADDER TUMOR (TURBT); Surgeon: Мария Lala MD;  [facility];  Service: Urology    MA CYSTOURETHROSCOPY,FULGUR <0 5 CM LESN N/A 2/27/2019    Procedure: TRANSURETHRAL RESECTION OF BLADDER TUMOR (TURBT); Surgeon: Мария Lala MD;  [facility];  Service: Urology    MA CYSTOURETHROSCOPY,URETER CATHETER N/A 4/25/2018    Procedure: CYSTOSCOPY WITH RETROGRADE PYELOGRAM;  Surgeon: Мария Lala MD;  [facility];  Service: Urology    MA INSTILL ANTICANCER AGENT IN BLADDER N/A 11/29/2017    Procedure: Judy Ruiz;  Surgeon: Мария Lala MD;  [facility];  Service: Urology    MA INSTILL ANTICANCER AGENT IN BLADDER N/A 2/27/2019    Procedure: Judy Ruiz;  Surgeon: Мария Lala MD;  [facility];  Service: Urology    SKIN BIOPSY      TONSILLECTOMY      TRANSURETHRAL RESECTION OF BLADDER TUMOR N/A 4/25/2018    Procedure: TRANSURETHRAL RESECTION OF BLADDER TUMOR (TURBT); Surgeon: Мария Lala MD;  [facility];  Service: Urology       Family History   Problem Relation Age of Onset    Heart failure Mother     Stomach cancer Father          Medications have been verified  Objective   /70   Pulse 72   Temp (!) 97 4 °F (36 3 °C)   Resp 17   Ht 5' 10" (1 778 m)   Wt 80 7 kg (178 lb)   SpO2 97%   BMI 25 54 kg/m²   No LMP for male patient  Physical Exam     Physical Exam  Vitals and nursing note reviewed  Constitutional:       General: He is awake  He is not in acute distress  Appearance: Normal appearance  He is well-developed and well-groomed  He is not ill-appearing, toxic-appearing or diaphoretic  HENT:      Head: Normocephalic and atraumatic  Right Ear: Hearing and external ear normal       Left Ear: Hearing and external ear normal    Eyes:      General: Lids are normal  Vision grossly intact   Gaze aligned appropriately  Extraocular Movements: Extraocular movements intact  Cardiovascular:      Rate and Rhythm: Normal rate  Pulmonary:      Effort: Pulmonary effort is normal       Comments: Patient is speaking in full sentences with no increased respiratory effort  No audible wheezing or stridor  Musculoskeletal:      Cervical back: Normal range of motion  Comments: Pt has full tendon function to the left small finger with 5/5 strength  Sensation is grossly intact to radial, ulnar, and medial nerve distributions with exception to the avulsed area  Capillary refill is < 2 seconds to all digits  No differences when compared to the contralateral side  Skin:     General: Skin is warm and dry  Comments: Patient has avulsion of the skin of the ulnar aspect of the left pinky without involvement of the nail  The area that is avulsed is a proximally 1 centimeter x 1 2 centimeters in length there is mild active bleeding currently  Neurological:      Mental Status: He is alert and oriented to person, place, and time  Coordination: Coordination is intact  Gait: Gait is intact  Psychiatric:         Attention and Perception: Attention and perception normal          Mood and Affect: Mood and affect normal          Speech: Speech normal          Behavior: Behavior normal  Behavior is cooperative  Laceration repair    Date/Time: 3/10/2022 6:07 PM  Performed by: Yolanda Mariscal PA-C  Authorized by: Yolanda Mariscal PA-C   Consent: Verbal consent obtained  Written consent not obtained  Risks and benefits: risks, benefits and alternatives were discussed  Consent given by: patient  Patient understanding: patient states understanding of the procedure being performed  Patient identity confirmed: verbally with patient  Time out: Immediately prior to procedure a "time out" was called to verify the correct patient, procedure, equipment, support staff and site/side marked as required    Body area: upper extremity  Location details: left small finger  Laceration length: 1 cm  Foreign bodies: no foreign bodies  Tendon involvement: none  Nerve involvement: none  Vascular damage: no    Sedation:  Patient sedated: no      Wound Dehiscence:  Superficial Wound Dehiscence: simple closure      Procedure Details:  Irrigation solution: betadine  Irrigation method: soak  Amount of cleaning: standard  Debridement: none  Degree of undermining: none  Skin closure: glue  Approximation: close  Approximation difficulty: simple  Dressing: gauze roll  Patient tolerance: patient tolerated the procedure well with no immediate complications  Comments: Small tourniquet was placed on the finger to exsanguinated the hip with avulsion occurred  Dermabond was then placed and allowed to dry tourniquet was removed no bleeding ensued  Note: Portions of this record may have been created with voice recognition software  Occasional wrong word or "sound a like" substitutions may have occurred due to the inherent limitations of voice recognition software  Please read the chart carefully and recognize, using context, where substitutions have occurred  *

## 2022-03-23 NOTE — PROGRESS NOTES
[address] [facility]  [facility]  [address] [facility] [place]  [place], [address] [facility]  [phone]  www  [facility]  org      NAME: Marquis Acharya  AGE: 80 y o  SEX: male  : 1950   MRN: [id number]    DATE: 3/25/2022  TIME: 3:35 PM    Assessment and Plan:    Bladder cancer as below:  No evidence recurrence  Follow-up 1 year with cystoscopy  Elevated PSA:  I think this may have been temporarily elevated due to a possible recent prostatitis  Recheck PSA in 3 months and send him the results  If it is still elevated, we will get MRI the prostate  Chief Complaint     Chief Complaint   Patient presents with    Bladder Cancer       History of Present Illness   Bladder cancer:  Status post TURBT of recurrence of bladder cancer 2019, had mitomycin as well  Here for surveillance cystoscopy  Pathology 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 and that was uninvolved  Previous TURBT was 2018 with posterior wall tumor in a tumor of low malignant potential at the left ureteral orifice  He underwent 6 weeks of BCG after the recurrence  Last cystoscopy 2021  He is already taking Keflex  Prostate cancer screening:  PSA is up to 4 94 3/22/2022  Free fraction is 9%  It was 4 92 2022, 3 1 2020, 3 8 2019  Around this time he had an episode of burning with urination and increased frequency and was thought to have a prostatitis  He was treated with antibiotics  Cystoscopy     Date/Time 3/25/2022 2:52 PM     Performed by  Brandie Gonsalves MD     Authorized by Brandie Gonsalves MD      Universal Protocol:  Consent: Verbal consent obtained  Written consent obtained        Procedure Details:  Procedure type: cystoscopy    Additional Procedure Details: Cystoscopy Procedure Note        Pre-operative Diagnosis:  Bladder cancer surveillance    Post-operative Diagnosis:  Same, no recurrence    Procedure: Flexible cystoscopy    Surgeon: Dipesh Subramanian MD    Anesthesia: 1% Xylocaine per urethra    EBL: Minimal    Complications: none    Procedure Details   The risks, benefits, complications, treatment options, and expected outcomes were discussed with the patient  The patient concurred with the proposed plan, giving informed consent  Cystoscopy was performed today under local anesthesia, using sterile technique  The patient was placed in the supine position, prepped with Betadine, and draped in the usual sterile fashion  The flexible cystocope was used to inspect both the urethra and bladder    Findings:  Urethra:  Normal without stricture  Prostate nonocclusive  Bladder:  Smooth, not trabeculated and there were no stones tumors or other lesions  The orifices were orthotopic and intact  No recurrence  The left ureteral orifice is patulous and shows no sign of pathology             Specimens:  None                 Complications:  None           Disposition: To home            Condition:  Stable          The following portions of the patient's history were reviewed and updated as appropriate: allergies, current medications, past family history, past medical history, past social history, past surgical history and problem list   Past Medical History:   Diagnosis Date    Arthritis     Bladder tumor     BPH with obstruction/lower urinary tract symptoms     Cancer [facility])     bladder    Cold     11/15 symptoms/to call Dr Lynn Chung if they persist or worsen    Dental crowns present     Elevated PSA     Hematuria, microscopic     History of total left knee replacement     twice one 2011/ and revision 2017    Hyperlipidemia     Hypertension     Irregular heart beat     not on EKGs anymore    Neuropathy     in feet    Prediabetes     Prostatitis, acute 09/18/2017    Seasonal allergies     Tooth missing     Wears glasses      Past Surgical History:   Procedure Laterality Date    CARDIAC CATHETERIZATION approx 15 yrs ago [facility]reg [facility]    COLONOSCOPY      CYSTOSCOPY      CYSTOSCOPY N/A 2/27/2019    Procedure: CYSTOSCOPY;  Surgeon: Rosi Martinez MD;  [facility];  Service: Urology    HERNIA REPAIR Right     inguinal    INSTILLATION MYTOMYCIN N/A 4/25/2018    Procedure: INSTILLATION MITOMYCIN;  Surgeon: Rosi Martinez MD;  [facility];  Service: Urology    JOINT REPLACEMENT Left     knee    KNEE ARTHROSCOPY Left 1991    ACL    LIPOMA RESECTION      MA CYSTOURETHROSCOPY N/A 11/29/2017    Procedure: Jethro Mansfield;  Surgeon: Rosi Martinez MD;  [facility];  Service: Urology    MA CYSTOURETHROSCOPY,FULGUR 2-5 CM LESN N/A 11/29/2017    Procedure: TRANSURETHRAL RESECTION OF BLADDER TUMOR (TURBT); Surgeon: Rosi Martinez MD;  [facility];  Service: Urology    MA CYSTOURETHROSCOPY,FULGUR <0 5 CM LESN N/A 2/27/2019    Procedure: TRANSURETHRAL RESECTION OF BLADDER TUMOR (TURBT); Surgeon: Rosi Martinez MD;  [facility];  Service: Urology    MA CYSTOURETHROSCOPY,URETER CATHETER N/A 4/25/2018    Procedure: CYSTOSCOPY WITH RETROGRADE PYELOGRAM;  Surgeon: Rosi Martinez MD;  [facility];  Service: Urology    MA INSTILL ANTICANCER AGENT IN BLADDER N/A 11/29/2017    Procedure: Rip Molina;  Surgeon: Rosi Martinez MD;  [facility];  Service: Urology    MA INSTILL ANTICANCER AGENT IN BLADDER N/A 2/27/2019    Procedure: Rip Molina;  Surgeon: Rosi Martinez MD;  [facility];  Service: Urology    SKIN BIOPSY      TONSILLECTOMY      TRANSURETHRAL RESECTION OF BLADDER TUMOR N/A 4/25/2018    Procedure: TRANSURETHRAL RESECTION OF BLADDER TUMOR (TURBT); Surgeon: Rosi Martinez MD;  [facility];  Service: Urology     shoulder  Review of Systems   Review of Systems   Constitutional: Positive for fever  Respiratory: Negative for shortness of breath  Cardiovascular: Negative for chest pain  Genitourinary: Negative  Active Problem List     Patient Active Problem List   Diagnosis    Malignant neoplasm of posterior wall of urinary bladder (HCC)    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)       Objective   /70   Pulse 79   Ht 5' 10" (1 778 m)   Wt 78 9 kg (174 lb)   BMI 24 97 kg/m²     Physical Exam  Vitals reviewed  Constitutional:       Appearance: Normal appearance  HENT:      Head: Normocephalic and atraumatic  Eyes:      Extraocular Movements: Extraocular movements intact  Pulmonary:      Effort: Pulmonary effort is normal    Genitourinary:     Penis: Normal     Musculoskeletal:         General: Normal range of motion  Cervical back: Normal range of motion  Skin:     Coloration: Skin is not jaundiced or pale  Neurological:      General: No focal deficit present  Mental Status: He is alert and oriented to person, place, and time  Mental status is at baseline  Psychiatric:         Mood and Affect: Mood normal          Behavior: Behavior normal          Thought Content:  Thought content normal          Judgment: Judgment normal              Current Medications     Current Outpatient Medications:     Alpha-Lipoic Acid 600 MG CAPS, Take 600 mg by mouth daily  , Disp: , Rfl:     bisoprolol-hydrochlorothiazide (ZIAC) 2 5-6 25 MG per tablet, Take 1 tablet by mouth daily, Disp: , Rfl:     cholecalciferol (VITAMIN D3) 1,000 units tablet, Take 500 Units by mouth daily , Disp: , Rfl:     CINNAMON PO, Take 500 mg by mouth daily  , Disp: , Rfl:     Omega 3 1200 MG CAPS, Take 1 capsule by mouth daily, Disp: , Rfl:     Probiotic Product (PROBIOTIC PO), Take 1 capsule by mouth daily, Disp: , Rfl:     Saw Palmetto, Serenoa repens, (SAW PALMETTO BERRY PO), Take by mouth daily, Disp: , Rfl:     sildenafil (VIAGRA) 50 MG tablet, 50 mg daily as needed, Disp: , Rfl: 3    tamsulosin (FLOMAX) 0 4 mg, Take 0 4 mg by mouth daily with dinner  , Disp: , Rfl:     amoxicillin (AMOXIL) 500 MG tablet, Take 500 mg by mouth daily as needed (one hour prior to dental appt) (Patient not taking: Reported on 3/25/2022 ), Disp: , Rfl:         Brenda Andino MD

## 2022-03-25 ENCOUNTER — TELEPHONE (OUTPATIENT)
Dept: UROLOGY | Facility: MEDICAL CENTER | Age: 72
End: 2022-03-25

## 2022-03-25 ENCOUNTER — PROCEDURE VISIT (OUTPATIENT)
Dept: UROLOGY | Facility: MEDICAL CENTER | Age: 72
End: 2022-03-25
Payer: MEDICARE

## 2022-03-25 VITALS
HEART RATE: 79 BPM | BODY MASS INDEX: 24.91 KG/M2 | HEIGHT: 70 IN | SYSTOLIC BLOOD PRESSURE: 140 MMHG | DIASTOLIC BLOOD PRESSURE: 70 MMHG | WEIGHT: 174 LBS

## 2022-03-25 DIAGNOSIS — C67.9 MALIGNANT NEOPLASM OF URINARY BLADDER, UNSPECIFIED SITE (HCC): Primary | ICD-10-CM

## 2022-03-25 DIAGNOSIS — R97.20 ELEVATED PSA: ICD-10-CM

## 2022-03-25 PROCEDURE — 99213 OFFICE O/P EST LOW 20 MIN: CPT | Performed by: UROLOGY

## 2022-03-25 PROCEDURE — 81003 URINALYSIS AUTO W/O SCOPE: CPT | Performed by: UROLOGY

## 2022-03-25 PROCEDURE — 52000 CYSTOURETHROSCOPY: CPT | Performed by: UROLOGY

## 2022-03-25 NOTE — PATIENT INSTRUCTIONS
Have PSA done in 3 months and I will send you the results  If the PSA still elevated or rising, we will get an MRI the prostate and consider biopsy  Otherwise, see me in 1 year for cystoscopy

## 2022-03-25 NOTE — LETTER
2022     Emily Gee MD  [address] [facility] [address]    Patient: Olimpia Bennett   YOB: 1950   Date of Visit: 3/25/2022       Dear Dr Zoay Weiner: Thank you for referring Alexandru Sun to me for evaluation  Below are my notes for this consultation  If you have questions, please do not hesitate to call me  I look forward to following your patient along with you  Sincerely,        Laurel Rico MD        CC: No Recipients  Laurel Rico MD  3/25/2022  3:36 PM  Sign when Signing Visit  [facility]  [address], [address] [place] [phone]  www  [facility]  org      NAME: Olimpia Bennett  AGE: 80 y o  SEX: male  : 1950   MRN: [id number]    DATE: 3/25/2022  TIME: 3:35 PM    Assessment and Plan:    Bladder cancer as below:  No evidence recurrence  Follow-up 1 year with cystoscopy  Elevated PSA:  I think this may have been temporarily elevated due to a possible recent prostatitis  Recheck PSA in 3 months and send him the results  If it is still elevated, we will get MRI the prostate  Chief Complaint     Chief Complaint   Patient presents with    Bladder Cancer       History of Present Illness   Bladder cancer:  Status post TURBT of recurrence of bladder cancer 2019, had mitomycin as well  Here for surveillance cystoscopy  Pathology 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 and that was uninvolved  Previous TURBT was 2018 with posterior wall tumor in a tumor of low malignant potential at the left ureteral orifice  He underwent 6 weeks of BCG after the recurrence  Last cystoscopy 2021  He is already taking Keflex  Prostate cancer screening:  PSA is up to 4 94 3/22/2022  Free fraction is 9%  It was 4 92 2022, 3 1 2020, 3 8 2019    Around this time he had an episode of burning with urination and increased frequency and was thought to have a prostatitis  He was treated with antibiotics  Cystoscopy     Date/Time 3/25/2022 2:52 PM     Performed by  Rosi Martinez MD     Authorized by Rosi Martinez MD      Universal Protocol:  Consent: Verbal consent obtained  Written consent obtained  Procedure Details:  Procedure type: cystoscopy    Additional Procedure Details: Cystoscopy Procedure Note        Pre-operative Diagnosis:  Bladder cancer surveillance    Post-operative Diagnosis:  Same, no recurrence    Procedure: Flexible cystoscopy    Surgeon: Carrie Carlton MD    Anesthesia: 1% Xylocaine per urethra    EBL: Minimal    Complications: none    Procedure Details   The risks, benefits, complications, treatment options, and expected outcomes were discussed with the patient  The patient concurred with the proposed plan, giving informed consent  Cystoscopy was performed today under local anesthesia, using sterile technique  The patient was placed in the supine position, prepped with Betadine, and draped in the usual sterile fashion  The flexible cystocope was used to inspect both the urethra and bladder    Findings:  Urethra:  Normal without stricture  Prostate nonocclusive  Bladder:  Smooth, not trabeculated and there were no stones tumors or other lesions  The orifices were orthotopic and intact  No recurrence  The left ureteral orifice is patulous and shows no sign of pathology             Specimens:  None                 Complications:  None           Disposition: To home            Condition:  Stable          The following portions of the patient's history were reviewed and updated as appropriate: allergies, current medications, past family history, past medical history, past social history, past surgical history and problem list   Past Medical History:   Diagnosis Date    Arthritis     Bladder tumor     BPH with obstruction/lower urinary tract symptoms     Cancer ([address] ) bladder    Cold     11/15 symptoms/to call Dr Markie Wong if they persist or worsen    Dental crowns present     Elevated PSA     Hematuria, microscopic     History of total left knee replacement     twice one 2011/ and revision 2017    Hyperlipidemia     Hypertension     Irregular heart beat     not on EKGs anymore    Neuropathy     in feet    Prediabetes     Prostatitis, acute 09/18/2017    Seasonal allergies     Tooth missing     Wears glasses      Past Surgical History:   Procedure Laterality Date    CARDIAC CATHETERIZATION      approx 15 yrs ago for irreg heart beat at [address] N/A 2/27/2019    Procedure: CYSTOSCOPY;  Surgeon: Luther Arias MD;  [facility];  Service: Urology    HERNIA REPAIR Right     inguinal    INSTILLATION MYTOMYCIN N/A 4/25/2018    Procedure: INSTILLATION MITOMYCIN;  Surgeon: Luther Arias MD;  [facility];  Service: Urology    JOINT REPLACEMENT Left     knee    KNEE ARTHROSCOPY Left 1991    ACL    LIPOMA RESECTION      UT CYSTOURETHROSCOPY N/A 11/29/2017    Procedure: CYSTOSCOPY;  Surgeon: Luther Arias MD;  [facility];  Service: Urology    UT CYSTOURETHROSCOPY,FULGUR 2-5 CM LESN N/A 11/29/2017    Procedure: TRANSURETHRAL RESECTION OF BLADDER TUMOR (TURBT); Surgeon: Luther Arias MD;  [facility];  Service: Urology    UT CYSTOURETHROSCOPY,FULGUR <0 5 CM LESN N/A 2/27/2019    Procedure: TRANSURETHRAL RESECTION OF BLADDER TUMOR (TURBT);   Surgeon: Luther Arias MD;  [facility];  Service: Urology    UT CYSTOURETHROSCOPY,URETER CATHETER N/A 4/25/2018    Procedure: CYSTOSCOPY WITH RETROGRADE PYELOGRAM;  Surgeon: Luther Arias MD;  [facility];  Service: Urology    UT INSTILL ANTICANCER AGENT IN BLADDER N/A 11/29/2017    Procedure: Millicent [place];  Surgeon: Luther Arias MD;  [facility];  Service: Urology    UT INSTILL ANTICANCER AGENT IN BLADDER N/A 2/27/2019 Procedure: INSTILLATION MITOMYCIN;  Surgeon: Taylor Bustillo MD;  [facility];  Service: Urology    SKIN BIOPSY      TONSILLECTOMY      TRANSURETHRAL RESECTION OF BLADDER TUMOR N/A 4/25/2018    Procedure: TRANSURETHRAL RESECTION OF BLADDER TUMOR (TURBT); Surgeon: Taylor Bustillo MD;  [facility];  Service: Urology     shoulder  Review of Systems   Review of Systems   Constitutional: Positive for fever  Respiratory: Negative for shortness of breath  Cardiovascular: Negative for chest pain  Genitourinary: Negative  Active Problem List     Patient Active Problem List   Diagnosis    Malignant neoplasm of posterior wall of urinary bladder (HCC)    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)       Objective   /70   Pulse 79   Ht 5' 10" (1 778 m)   Wt 78 9 kg (174 lb)   BMI 24 97 kg/m²     Physical Exam  Vitals reviewed  Constitutional:       Appearance: Normal appearance  HENT:      Head: Normocephalic and atraumatic  Eyes:      Extraocular Movements: Extraocular movements intact  Pulmonary:      Effort: Pulmonary effort is normal    Genitourinary:     Penis: Normal     Musculoskeletal:         General: Normal range of motion  Cervical back: Normal range of motion  Skin:     Coloration: Skin is not jaundiced or pale  Neurological:      General: No focal deficit present  Mental Status: He is alert and oriented to person, place, and time  Mental status is at baseline  Psychiatric:         Mood and Affect: Mood normal          Behavior: Behavior normal          Thought Content:  Thought content normal          Judgment: Judgment normal              Current Medications     Current Outpatient Medications:     Alpha-Lipoic Acid 600 MG CAPS, Take 600 mg by mouth daily  , Disp: , Rfl:     bisoprolol-hydrochlorothiazide (ZIAC) 2 5-6 25 MG per tablet, Take 1 tablet by mouth daily, Disp: , Rfl:     cholecalciferol (VITAMIN D3) 1,000 units tablet, Take 500 Units by mouth daily , Disp: , Rfl:     CINNAMON PO, Take 500 mg by mouth daily  , Disp: , Rfl:     Omega 3 1200 MG CAPS, Take 1 capsule by mouth daily, Disp: , Rfl:     Probiotic Product (PROBIOTIC PO), Take 1 capsule by mouth daily, Disp: , Rfl:     Saw Palmetto, Serenoa repens, (SAW PALMETTO BERRY PO), Take by mouth daily, Disp: , Rfl:     sildenafil (VIAGRA) 50 MG tablet, 50 mg daily as needed, Disp: , Rfl: 3    tamsulosin (FLOMAX) 0 4 mg, Take 0 4 mg by mouth daily with dinner  , Disp: , Rfl:     amoxicillin (AMOXIL) 500 MG tablet, Take 500 mg by mouth daily as needed (one hour prior to dental appt) (Patient not taking: Reported on 3/25/2022 ), Disp: , Rfl:         Chuy Morales MD

## 2022-07-05 ENCOUNTER — APPOINTMENT (OUTPATIENT)
Dept: LAB | Age: 72
End: 2022-07-05
Payer: MEDICARE

## 2022-07-05 DIAGNOSIS — R97.20 ELEVATED PSA: ICD-10-CM

## 2022-07-05 LAB — PSA SERPL-MCNC: 3.3 NG/ML (ref 0–4)

## 2022-07-05 PROCEDURE — 84153 ASSAY OF PSA TOTAL: CPT

## 2022-07-05 PROCEDURE — 36415 COLL VENOUS BLD VENIPUNCTURE: CPT

## 2022-11-22 ENCOUNTER — TELEPHONE (OUTPATIENT)
Dept: OTHER | Facility: OTHER | Age: 72
End: 2022-11-22

## 2023-06-05 ENCOUNTER — APPOINTMENT (OUTPATIENT)
Dept: LAB | Age: 73
End: 2023-06-05
Payer: MEDICARE

## 2023-06-05 DIAGNOSIS — R97.20 ELEVATED PSA: ICD-10-CM

## 2023-06-05 LAB — PSA SERPL-MCNC: 4.69 NG/ML (ref 0–4)

## 2023-06-05 PROCEDURE — 84153 ASSAY OF PSA TOTAL: CPT

## 2023-06-05 PROCEDURE — 36415 COLL VENOUS BLD VENIPUNCTURE: CPT

## 2023-06-09 ENCOUNTER — OFFICE VISIT (OUTPATIENT)
Dept: URGENT CARE | Age: 73
End: 2023-06-09
Payer: MEDICARE

## 2023-06-09 VITALS
BODY MASS INDEX: 24.2 KG/M2 | HEART RATE: 65 BPM | OXYGEN SATURATION: 99 % | SYSTOLIC BLOOD PRESSURE: 118 MMHG | WEIGHT: 169 LBS | HEIGHT: 70 IN | RESPIRATION RATE: 17 BRPM | DIASTOLIC BLOOD PRESSURE: 68 MMHG

## 2023-06-09 DIAGNOSIS — M25.511 ACUTE PAIN OF BOTH SHOULDERS: Primary | ICD-10-CM

## 2023-06-09 DIAGNOSIS — R09.81 NASAL CONGESTION: ICD-10-CM

## 2023-06-09 DIAGNOSIS — M25.512 ACUTE PAIN OF BOTH SHOULDERS: Primary | ICD-10-CM

## 2023-06-09 PROCEDURE — 99213 OFFICE O/P EST LOW 20 MIN: CPT

## 2023-06-09 PROCEDURE — G0463 HOSPITAL OUTPT CLINIC VISIT: HCPCS

## 2023-06-09 RX ORDER — FLUTICASONE PROPIONATE 50 MCG
1 SPRAY, SUSPENSION (ML) NASAL DAILY
Qty: 11.1 ML | Refills: 0 | Status: SHIPPED | OUTPATIENT
Start: 2023-06-09

## 2023-06-09 RX ORDER — DICLOFENAC SODIUM 75 MG/1
75 TABLET, DELAYED RELEASE ORAL 2 TIMES DAILY
Qty: 30 TABLET | Refills: 0 | Status: SHIPPED | OUTPATIENT
Start: 2023-06-09

## 2023-06-09 NOTE — PROGRESS NOTES
[facility]        NAME: Whitney Morrissey is a 68 y o  male  : 1950    MRN: [id number]  DATE: 2023  TIME: 10:17 AM    Assessment and Plan   Acute pain of both shoulders [M25 511, M25 512]  1  Acute pain of both shoulders  diclofenac (VOLTAREN) 75 mg EC tablet      2  Nasal congestion  fluticasone (FLONASE) 50 mcg/act nasal spray            Patient Instructions     Discussed continuing OTC medications and supportive care  With FAROM and no trauma/injury, imaging not necessary at this time  Discussed starting Voltaren  No evidence of bacterial infection on exam today  Home COVID testing negative  Start nasal steroid  Discussed OTC cold medicine  Follow up with PCP in 2-3 days  Proceed to ER if symptoms worsen  Chief Complaint     Chief Complaint   Patient presents with   • Cold Like Symptoms     Feeling off, b/l shoulder pain R>L, LBP and nasal congestion  Tried Tyl, Home COVID (-) today         History of Present Illness     68 y o  R hand dominant M  Complains of BL shoulder pain and upper back pain x several day  Also reports fatigue & congestion  States he was moving lawn furniture last week and his R shoulder started hurting  States pain then started in left shoulder and radiates into scapulas  Taking Tylenol, Icy Hot & ASA OTC  States [employer] helps  Denies falling or trauma/injury  Reports hx of arthritis  Denies CP or SOB  No fevers  No OTC cold medicine  Home COVID testing negative    Review of Systems   Review of Systems   Constitutional: Positive for fatigue  Negative for chills and fever  HENT: Positive for congestion  Negative for ear discharge, ear pain, facial swelling, rhinorrhea, sinus pressure, sinus pain, sore throat and trouble swallowing  Eyes: Negative for photophobia, pain and visual disturbance  Respiratory: Negative for cough, chest tightness, shortness of breath and wheezing  Cardiovascular: Negative for chest pain, palpitations and leg swelling  Gastrointestinal: Negative for abdominal pain, diarrhea, nausea and vomiting  Genitourinary: Negative for dysuria, flank pain and hematuria  Musculoskeletal: Positive for arthralgias  Negative for back pain, myalgias and neck pain  Skin: Negative for pallor and wound  Neurological: Negative for dizziness, syncope, weakness, numbness and headaches  Psychiatric/Behavioral: Negative for confusion and sleep disturbance  All other systems reviewed and are negative          Current Medications       Current Outpatient Medications:   •  Alpha-Lipoic Acid 600 MG CAPS, Take 600 mg by mouth daily  , Disp: , Rfl:   •  bisoprolol-hydrochlorothiazide (ZIAC) 2 5-6 25 MG per tablet, Take 1 tablet by mouth daily, Disp: , Rfl:   •  cholecalciferol (VITAMIN D3) 1,000 units tablet, Take 500 Units by mouth daily , Disp: , Rfl:   •  diclofenac (VOLTAREN) 75 mg EC tablet, Take 1 tablet (75 mg total) by mouth 2 (two) times a day, Disp: 30 tablet, Rfl: 0  •  fluticasone (FLONASE) 50 mcg/act nasal spray, 1 spray into each nostril daily, Disp: 11 1 mL, Rfl: 0  •  Multiple Vitamins-Minerals (PreserVision AREDS 2) CAPS, Take by mouth in the morning Vision supplement, Disp: , Rfl:   •  Omega 3 1200 MG CAPS, Take 1 capsule by mouth daily, Disp: , Rfl:   •  Probiotic Product (PROBIOTIC PO), Take 1 capsule by mouth daily, Disp: , Rfl:   •  sildenafil (VIAGRA) 50 MG tablet, 50 mg daily as needed, Disp: , Rfl: 3  •  tamsulosin (FLOMAX) 0 4 mg, Take 0 4 mg by mouth daily with dinner  , Disp: , Rfl:   •  amoxicillin (AMOXIL) 500 MG tablet, Take 500 mg by mouth daily as needed (one hour prior to dental appt) (Patient not taking: Reported on 3/25/2022), Disp: , Rfl:     Current Allergies     Allergies as of 06/09/2023   • (No Known Allergies)            The following portions of the patient's history were reviewed and updated as appropriate: allergies, current medications, past family history, past medical history, past social history, past surgical history and problem list      Past Medical History:   Diagnosis Date   • Arthritis    • Bladder tumor    • BPH with obstruction/lower urinary tract symptoms    • Cancer [facility])     bladder   • Cold     11/15 symptoms/to call Dr Margarita Crystal if they persist or worsen   • Dental crowns present    • Elevated PSA    • Hematuria, microscopic    • History of total left knee replacement     twice one 2011/ and revision 2017   • Hyperlipidemia    • Hypertension    • Irregular heart beat     not on EKGs anymore   • Neuropathy     in feet   • Prediabetes    • Prostatitis, acute 09/18/2017   • Seasonal allergies    • Tooth missing    • Wears glasses        Past Surgical History:   Procedure Laterality Date   • CARDIAC CATHETERIZATION      approx 15 yrs ago for irreg heart beat at [facility]   • COLONOSCOPY     • CYSTOSCOPY     • CYSTOSCOPY N/A 2/27/2019    Procedure: CYSTOSCOPY;  Surgeon: Denny Dorman MD;  [facility];  Service: Urology   • HERNIA REPAIR Right     inguinal   • INSTILLATION MYTOMYCIN N/A 4/25/2018    Procedure: INSTILLATION MITOMYCIN;  Surgeon: Denny Dorman MD;  [facility];  Service: Urology   • JOINT REPLACEMENT Left     knee   • KNEE ARTHROSCOPY Left 1991    ACL   • LIPOMA RESECTION     • IL BLADDER INSTILLATION ANTICARCINOGENIC AGENT N/A 11/29/2017    Procedure: Kalia Cola;  Surgeon: Denny Dorman MD;  [facility];  Service: Urology   • IL BLADDER INSTILLATION ANTICARCINOGENIC AGENT N/A 2/27/2019    Procedure: Kalia Cola;  Surgeon: Denny Dorman MD;  [facility];  Service: Urology   • IL CYSTO BLADDER W/URETERAL CATHETERIZATION N/A 4/25/2018    Procedure: CYSTOSCOPY WITH RETROGRADE PYELOGRAM;  Surgeon: Denny Dorman MD;  [facility];  Service: Urology   • IL CYSTO W/REMOVAL OF LESIONS SMALL N/A 2/27/2019    Procedure: TRANSURETHRAL RESECTION OF BLADDER TUMOR (TURBT);   Surgeon: Denny Dorman MD;  [facility];  Service: Urology   • IL "CYSTOURETHROSCOPY N/A 11/29/2017    Procedure: CYSTOSCOPY;  Surgeon: Rajiv Foster MD;  [facility];  Service: Urology   • IL CYSTOURETHROSCOPY W/DEST &/RMVL MED BLADDER EDWARD N/A 11/29/2017    Procedure: TRANSURETHRAL RESECTION OF BLADDER TUMOR (TURBT); Surgeon: Rajiv Foster MD;  [facility];  Service: Urology   • SKIN BIOPSY     • TONSILLECTOMY     • TRANSURETHRAL RESECTION OF BLADDER TUMOR N/A 4/25/2018    Procedure: TRANSURETHRAL RESECTION OF BLADDER TUMOR (TURBT); Surgeon: Rajiv Foster MD;  [facility];  Service: Urology       Family History   Problem Relation Age of Onset   • Heart failure Mother    • Stomach cancer Father          Medications have been verified  Objective   /68 Comment: right arm manually  Pulse 65   Resp 17   Ht 5' 10\" (1 778 m)   Wt 76 7 kg (169 lb)   SpO2 99%   BMI 24 25 kg/m²   No LMP for male patient  Physical Exam     Physical Exam  Vitals reviewed  Constitutional:       General: He is not in acute distress  Appearance: Normal appearance  He is normal weight  He is not ill-appearing or toxic-appearing  Comments: WNWD; NAD   HENT:      Head: Normocephalic  Right Ear: Tympanic membrane normal  No middle ear effusion  Tympanic membrane is not erythematous or bulging  Left Ear: Tympanic membrane normal   No middle ear effusion  Tympanic membrane is not erythematous or bulging  Nose: Nose normal       Right Turbinates: Swollen  Left Turbinates: Swollen  Right Sinus: No maxillary sinus tenderness or frontal sinus tenderness  Left Sinus: No maxillary sinus tenderness or frontal sinus tenderness  Mouth/Throat:      Mouth: Mucous membranes are moist       Pharynx: Uvula midline  No oropharyngeal exudate, posterior oropharyngeal erythema or uvula swelling  Tonsils: No tonsillar exudate or tonsillar abscesses        Comments:   No swelling or exudate  No erythema  Eyes:      Extraocular Movements: " Extraocular movements intact  Conjunctiva/sclera: Conjunctivae normal       Pupils: Pupils are equal, round, and reactive to light  Neck:      Comments:   No cervical lymphadenopathy  Cardiovascular:      Rate and Rhythm: Normal rate and regular rhythm  Pulses: Normal pulses  Heart sounds: Normal heart sounds  Pulmonary:      Effort: Pulmonary effort is normal  No tachypnea, accessory muscle usage or respiratory distress  Breath sounds: Normal breath sounds and air entry  No decreased breath sounds, wheezing, rhonchi or rales  Comments:   CTAB no wheezing or stridor  No cough on exam  Abdominal:      General: Bowel sounds are normal       Palpations: Abdomen is soft  Tenderness: There is no abdominal tenderness  Musculoskeletal:         General: Normal range of motion  Right shoulder: Tenderness present  Normal range of motion  Left shoulder: Tenderness present  Normal range of motion  Cervical back: Normal range of motion and neck supple  Comments:   Mildly TTP at glenohumeral joints BL  No bony tenderness in humerus or elbow  No scapular tenderness  No neck pain  FAROM - abduction to 180 both extremities   Lymphadenopathy:      Cervical: No cervical adenopathy  Skin:     General: Skin is warm and dry  Capillary Refill: Capillary refill takes less than 2 seconds  Neurological:      General: No focal deficit present  Mental Status: He is alert  Cranial Nerves: No cranial nerve deficit  Sensory: Sensation is intact  Motor: Motor function is intact  Coordination: Coordination is intact  Deep Tendon Reflexes: Reflexes are normal and symmetric

## 2023-06-09 NOTE — PATIENT INSTRUCTIONS
Discussed starting Voltaren for shoulder pain  May continue Tylenol  Icy hot topically  No signs of bacterial infection on exam today - lungs are clear  No lymphadenopathy  No purulent drainage  Discussed OTC medications and supportive care  Nasal steroid sent to pharmacy  Discussed follow up if symptoms persist or worsen  Proceed to ER for any chest pain, shortness of breath, confusion, numbness or tingling  Feel better!

## 2023-07-11 ENCOUNTER — TELEMEDICINE (OUTPATIENT)
Dept: UROLOGY | Facility: CLINIC | Age: 73
End: 2023-07-11
Payer: MEDICARE

## 2023-07-11 ENCOUNTER — TELEPHONE (OUTPATIENT)
Dept: FAMILY MEDICINE CLINIC | Facility: CLINIC | Age: 73
End: 2023-07-11

## 2023-07-11 DIAGNOSIS — R97.20 ELEVATED PSA: Primary | ICD-10-CM

## 2023-07-11 PROCEDURE — 99212 OFFICE O/P EST SF 10 MIN: CPT | Performed by: UROLOGY

## 2023-07-11 NOTE — PROGRESS NOTES
Virtual Regular Visit    Verification of patient location:    Patient is located at Home in the following state in which I hold an active license [place]      Assessment/Plan:    Problem List Items Addressed This Visit    None           Reason for visit is   Chief Complaint   Patient presents with   • Virtual Regular Visit        Encounter provider Carmen Sparrow MD    Provider located at [address] [facility]  [address] [facility] [address] [facility]  [phone]      Recent Visits  No visits were found meeting these conditions. Showing recent visits within past 7 days and meeting all other requirements  Future Appointments  No visits were found meeting these conditions. Showing future appointments within next 150 days and meeting all other requirements       The patient was identified by name and date of birth. Pato Souza was informed that this is a telemedicine visit and that the visit is being conducted through the [address] [place] [address] 22 [employer] platform. He agrees to proceed. .  My office door was closed. No one else was in the room. He acknowledged consent and understanding of privacy and security of the video platform. The patient has agreed to participate and understands they can discontinue the visit at any time. Patient is aware this is a billable service. Subjective  Pato Souza is a 68 y.o. male  . HPI history of bladder cancer status post TURBT of recurrence February 22, 2019, had mitomycin as well. 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 The previous TURBT before that was able 25th 2018 with posterior wall tumor and a tumor of low malignant potential at the left ureteral orifice. He underwent 6 weeks of BCG after the recurrence. Last cystoscopy was by me April 13, 2023 and was negative. Currently is having no urination issues. Elevated PSA: PSA was up to 4.94 March 22, 2022. It was 4.92 February 24, 2022, 3.1 August 20, 2020, 3.8 August 2019. He thought he may have had prostatitis at that time. He was treated with antibiotics and the PSA normalized to 3.3 July 5, 2022. PSA was up to 4.69 as of June 5, 2023. We discussed the merits of obtaining a multiparametric MRI of the prostate. Although his PSA is fluctuating and basically stable, it would be nice to know that he is not harboring a latent cancer. He left the decision up to me so I decided to go ahead and order this. We will also get renal function panel prior. I will send him the results and if it is negative, I will see him in April 2024 with a repeat cystoscopy.         Past Medical History:   Diagnosis Date   • Arthritis    • Bladder tumor    • BPH with obstruction/lower urinary tract symptoms    • Cancer [facility])     bladder   • Cold     11/15 symptoms/to call Dr Delmi Leyva if they persist or worsen   • Dental crowns present    • Elevated PSA    • Hematuria, microscopic    • History of total left knee replacement     twice one 2011/ and revision 2017   • Hyperlipidemia    • Hypertension    • Irregular heart beat     not on EKGs anymore   • Neuropathy     in feet   • Prediabetes    • Prostatitis, acute 09/18/2017   • Seasonal allergies    • Tooth missing    • Wears glasses        Past Surgical History:   Procedure Laterality Date   • CARDIAC CATHETERIZATION      approx 15 yrs ago for irreg heart beat at [facility]   • COLONOSCOPY     • CYSTOSCOPY     • CYSTOSCOPY N/A 2/27/2019    Procedure: CYSTOSCOPY;  Surgeon: Cleo Stratton MD;  [facility];  Service: Urology   • HERNIA REPAIR Right     inguinal   • INSTILLATION MYTOMYCIN N/A 4/25/2018    Procedure: INSTILLATION MITOMYCIN;  Surgeon: Cleo Stratton MD;  [facility];  Service: Urology   • JOINT REPLACEMENT Left     knee   • KNEE ARTHROSCOPY Left 1991    ACL   • LIPOMA RESECTION     • [place] BLADDER INSTILLATION ANTICARCINOGENIC AGENT N/A 11/29/2017    Procedure: Tri Mims MYTOMYCIN;  Surgeon: Louis Burroughs MD;  [facility];  Service: Urology   • [place] BLADDER INSTILLATION ANTICARCINOGENIC AGENT N/A 2/27/2019    Procedure: Fariba Bliss;  Surgeon: Louis Burroughs MD;  [facility];  Service: Urology   • LA CYSTO BLADDER W/URETERAL CATHETERIZATION N/A 4/25/2018    Procedure: CYSTOSCOPY WITH RETROGRADE PYELOGRAM;  Surgeon: Louis Burroughs MD;  [facility];  Service: Urology   • LA CYSTO W/REMOVAL OF LESIONS SMALL N/A 2/27/2019    Procedure: TRANSURETHRAL RESECTION OF BLADDER TUMOR (TURBT); Surgeon: Louis Burroughs MD;  [facility];  Service: Urology   • LA CYSTOURETHROSCOPY N/A 11/29/2017    Procedure: CYSTOSCOPY;  Surgeon: Louis Burroughs MD;  [facility];  Service: Urology   • LA CYSTOURETHROSCOPY W/DEST &/RMVL MED BLADDER EDWARD N/A 11/29/2017    Procedure: TRANSURETHRAL RESECTION OF BLADDER TUMOR (TURBT); Surgeon: Louis Burroughs MD;  [facility];  Service: Urology   • SKIN BIOPSY     • TONSILLECTOMY     • TRANSURETHRAL RESECTION OF BLADDER TUMOR N/A 4/25/2018    Procedure: TRANSURETHRAL RESECTION OF BLADDER TUMOR (TURBT);   Surgeon: Louis Burroughs MD;  [facility];  Service: Urology       Current Outpatient Medications   Medication Sig Dispense Refill   • Alpha-Lipoic Acid 600 MG CAPS Take 600 mg by mouth daily       • amoxicillin (AMOXIL) 500 MG tablet Take 500 mg by mouth daily as needed (one hour prior to dental appt) (Patient not taking: Reported on 3/25/2022)     • bisoprolol-hydrochlorothiazide (ZIAC) 2.5-6.25 MG per tablet Take 1 tablet by mouth daily     • cholecalciferol (VITAMIN D3) 1,000 units tablet Take 500 Units by mouth daily      • diclofenac (VOLTAREN) 75 mg EC tablet Take 1 tablet (75 mg total) by mouth 2 (two) times a day 30 tablet 0   • fluticasone (FLONASE) 50 mcg/act nasal spray 1 spray into each nostril daily 11.1 mL 0   • Multiple Vitamins-Minerals (PreserVision AREDS 2) CAPS Take by mouth in the morning Vision supplement     • Omega 3 1200 MG CAPS Take 1 capsule by mouth daily     • Probiotic Product (PROBIOTIC PO) Take 1 capsule by mouth daily     • sildenafil (VIAGRA) 50 MG tablet 50 mg daily as needed  3   • tamsulosin (FLOMAX) 0.4 mg Take 0.4 mg by mouth daily with dinner         No current facility-administered medications for this visit. No Known Allergies    Review of Systems    Video Exam    There were no vitals filed for this visit.     Physical Exam     Visit Time  Total Visit Duration: 10

## 2023-07-11 NOTE — LETTER
July 11, 2023     Wily Bush MD  [address] [facility],[address] [address]    Patient: Anh Carranza   YOB: 1950   Date of Visit: 7/11/2023       Dear Dr. Raquel Martinez: Thank you for referring Suhail Jones to me for evaluation. Below are my notes for this consultation. If you have questions, please do not hesitate to call me. I look forward to following your patient along with you. Sincerely,        Reigs Munguia MD        CC: No Recipients    Regis Munguia MD  7/11/2023  2:22 PM  Sign when Signing Visit    Virtual Regular Visit    Verification of patient location:    Patient is located at Home in the following state in which I hold an active license [place]      Assessment/Plan:    Problem List Items Addressed This Visit    None           Reason for visit is   Chief Complaint   Patient presents with   • Virtual Regular Visit        Encounter provider Regis Munguia MD    Provider located at [address] [facility]  [address] [facility] [address] [place] [address]  [phone]      Recent Visits  No visits were found meeting these conditions. Showing recent visits within past 7 days and meeting all other requirements  Future Appointments  No visits were found meeting these conditions. Showing future appointments within next 150 days and meeting all other requirements       The patient was identified by name and date of birth. Anh Carranza was informed that this is a telemedicine visit and that the visit is being conducted through the [address] [place] [address] [employer] platform. He agrees to proceed. .  My office door was closed. No one else was in the room. He acknowledged consent and understanding of privacy and security of the video platform. The patient has agreed to participate and understands they can discontinue the visit at any time. Patient is aware this is a billable service. Subjective  Anh Carranza is a 68 y.o. male  .       HPI history of bladder cancer status post TURBT of recurrence February 22, 2019, had mitomycin as well. 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 The previous TURBT before that was able 25th 2018 with posterior wall tumor and a tumor of low malignant potential at the left ureteral orifice. He underwent 6 weeks of BCG after the recurrence. Last cystoscopy was by me April 13, 2023 and was negative. Currently is having no urination issues. Elevated PSA: PSA was up to 4.94 March 22, 2022. It was 4.92 February 24, 2022, 3.1 August 20, 2020, 3.8 August 2019. He thought he may have had prostatitis at that time. He was treated with antibiotics and the PSA normalized to 3.3 July 5, 2022. PSA was up to 4.69 as of June 5, 2023. We discussed the merits of obtaining a multiparametric MRI of the prostate. Although his PSA is fluctuating and basically stable, it would be nice to know that he is not harboring a latent cancer. He left the decision up to me so I decided to go ahead and order this. We will also get renal function panel prior. I will send him the results and if it is negative, I will see him in April 2024 with a repeat cystoscopy.         Past Medical History:   Diagnosis Date   • Arthritis    • Bladder tumor    • BPH with obstruction/lower urinary tract symptoms    • Cancer [facility])     bladder   • Cold     11/15 symptoms/to call Dr Waylon Wolfe if they persist or worsen   • Dental crowns present    • Elevated PSA    • Hematuria, microscopic    • History of total left knee replacement     twice one 2011/ and revision 2017   • Hyperlipidemia    • Hypertension    • Irregular heart beat     not on EKGs anymore   • Neuropathy     in feet   • Prediabetes    • Prostatitis, acute 09/18/2017   • Seasonal allergies    • Tooth missing    • Wears glasses        Past Surgical History:   Procedure Laterality Date   • CARDIAC CATHETERIZATION      approx 15 yrs ago for irreg heart beat at [facility]   • COLONOSCOPY     • CYSTOSCOPY     • CYSTOSCOPY N/A 2/27/2019    Procedure: CYSTOSCOPY;  Surgeon: Francesca Krishnan MD;  [facility];  Service: Urology   • HERNIA REPAIR Right     inguinal   • INSTILLATION MYTOMYCIN N/A 4/25/2018    Procedure: INSTILLATION MITOMYCIN;  Surgeon: Francesca Krishnan MD;  [facility];  Service: Urology   • JOINT REPLACEMENT Left     knee   • KNEE ARTHROSCOPY Left 1991    ACL   • LIPOMA RESECTION     • [place] BLADDER INSTILLATION ANTICARCINOGENIC AGENT N/A 11/29/2017    Procedure: Jaymie Leon;  Surgeon: Francesca Krishnan MD;  [facility];  Service: Urology   • [place] BLADDER INSTILLATION ANTICARCINOGENIC AGENT N/A 2/27/2019    Procedure: Jaymie Leon;  Surgeon: Francesca Krishnan MD;  [facility];  Service: Urology   • [place] CYSTO BLADDER W/URETERAL CATHETERIZATION N/A 4/25/2018    Procedure: CYSTOSCOPY WITH RETROGRADE PYELOGRAM;  Surgeon: Francesca Krishnan MD;  [facility];  Service: Urology   • [place] CYSTO W/REMOVAL OF LESIONS SMALL N/A 2/27/2019    Procedure: TRANSURETHRAL RESECTION OF BLADDER TUMOR (TURBT); Surgeon: Francesca Krishnan MD;  [facility];  Service: Urology   • [place] CYSTOURETHROSCOPY N/A 11/29/2017    Procedure: CYSTOSCOPY;  Surgeon: Francesca Krishnan MD;  [facility];  Service: Urology   • [place] CYSTOURETHROSCOPY W/DEST &/RMVL MED BLADDER EDWARD N/A 11/29/2017    Procedure: TRANSURETHRAL RESECTION OF BLADDER TUMOR (TURBT); Surgeon: Francesca Krishnan MD;  [facility];  Service: Urology   • SKIN BIOPSY     • TONSILLECTOMY     • TRANSURETHRAL RESECTION OF BLADDER TUMOR N/A 4/25/2018    Procedure: TRANSURETHRAL RESECTION OF BLADDER TUMOR (TURBT);   Surgeon: Francesca Krishnan MD;  [facility];  Service: Urology       Current Outpatient Medications   Medication Sig Dispense Refill   • Alpha-Lipoic Acid 600 MG CAPS Take 600 mg by mouth daily       • amoxicillin (AMOXIL) 500 MG tablet Take 500 mg by mouth daily as needed (one hour prior to dental appt) (Patient not taking: Reported on 3/25/2022)     • bisoprolol-hydrochlorothiazide (ZIAC) 2.5-6.25 MG per tablet Take 1 tablet by mouth daily     • cholecalciferol (VITAMIN D3) 1,000 units tablet Take 500 Units by mouth daily      • diclofenac (VOLTAREN) 75 mg EC tablet Take 1 tablet (75 mg total) by mouth 2 (two) times a day 30 tablet 0   • fluticasone (FLONASE) 50 mcg/act nasal spray 1 spray into each nostril daily 11.1 mL 0   • Multiple Vitamins-Minerals (PreserVision AREDS 2) CAPS Take by mouth in the morning Vision supplement     • Omega 3 1200 MG CAPS Take 1 capsule by mouth daily     • Probiotic Product (PROBIOTIC PO) Take 1 capsule by mouth daily     • sildenafil (VIAGRA) 50 MG tablet 50 mg daily as needed  3   • tamsulosin (FLOMAX) 0.4 mg Take 0.4 mg by mouth daily with dinner         No current facility-administered medications for this visit. No Known Allergies    Review of Systems    Video Exam    There were no vitals filed for this visit.     Physical Exam     Visit Time  Total Visit Duration: 10

## 2023-07-20 ENCOUNTER — TELEPHONE (OUTPATIENT)
Dept: UROLOGY | Facility: CLINIC | Age: 73
End: 2023-07-20

## 2023-07-25 ENCOUNTER — HOSPITAL ENCOUNTER (OUTPATIENT)
Facility: MEDICAL CENTER | Age: 73
Discharge: HOME | End: 2023-07-25
Attending: UROLOGY
Payer: MEDICARE

## 2023-07-25 DIAGNOSIS — R97.20 ELEVATED PSA: ICD-10-CM

## 2023-07-25 PROCEDURE — A9585 GADOBUTROL INJECTION: HCPCS | Performed by: UROLOGY

## 2023-07-25 PROCEDURE — G1004 CDSM NDSC: HCPCS

## 2023-07-25 PROCEDURE — 76377 3D RENDER W/INTRP POSTPROCES: CPT

## 2023-07-25 PROCEDURE — 72197 MRI PELVIS W/O & W/DYE: CPT

## 2023-07-25 RX ADMIN — GADOBUTROL 7 ML: 604.72 INJECTION INTRAVENOUS at 13:58

## 2023-07-25 NOTE — LETTER
[address] [place] [id number]      August 3, 2023    MRN: [id number]     Phone: [phone]     Dear Mr. Yareli Tan recently had a(n) MRI performed on 7/25/2023 at  [address] [place] [address] that was requested by Jimena Ballard MD. The study was reviewed by a radiologist, which is a physician who specializes in medical imaging. The radiologist issued a report describing his or her findings. In that report there was a finding that the radiologist felt warranted further discussion with your health care provider and that discussion would be beneficial to you. The results were sent to Jimena Ballard MD on 07/31/2023  7:40 AM. We recommend that you contact Jimena Ballard MD at [phone] or set up an appointment to discuss the results of the imaging test. If you have already heard from Jimena Ballard MD regarding the results of your study, you can disregard this letter. This letter is not meant to alarm you, but intended to encourage you to follow-up on your results with the provider that sent you for the imaging study. In addition, we have enclosed answers to frequently asked questions by other patients who have also received a letter to review results with their health care provider (see page two). Thank you for choosing [address] [place] [address] for your medical imaging needs. FREQUENTLY ASKED QUESTIONS    Why am I receiving this letter? [facility] requires us to notify patients who have findings on imaging exams that may require more testing or follow-up with a health professional within the next 3 months. How serious is the finding on the imaging test?  This letter is sent to all patients who may need follow-up or more testing within the next 3 months.   Receiving this letter does not necessarily mean you have a life-threatening imaging finding or disease. Recommendations in the radiologist’s imaging report are general in nature and it is up to your healthcare provider to say whether those recommendations make sense for your situation. You are strongly encouraged to talk to your health care provider about the results and ask whether additional steps need to be taken. Where can I get a copy of the final report for my recent radiology exam?  To get a full copy of the report you can access your records online at [email or web address] or please contact [facility] Medical Records Department at [phone] Monday through Friday between 8 am and 6 pm.         What do I need to do now? Please contact your health care provider who requested the imaging study to discuss what further actions (if any) are needed. You may have already heard from (your ordering provider) in regard to this test in which case you can disregard this letter. NOTICE IN ACCORDANCE WITH THE [place] STATE “PATIENT TEST RESULT INFORMATION ACT OF 2018”    You are receiving this notice as a result of a determination by your diagnostic imaging service that further discussions of your test results are warranted and would be beneficial to you. The complete results of your test or tests have been or will be sent to the health care practitioner that ordered the test or tests. It is recommended that you contact your health care practitioner to discuss your results as soon as possible.

## 2023-07-31 ENCOUNTER — PREP FOR PROCEDURE (OUTPATIENT)
Dept: UROLOGY | Facility: CLINIC | Age: 73
End: 2023-07-31

## 2023-07-31 ENCOUNTER — TELEPHONE (OUTPATIENT)
Dept: OTHER | Facility: HOSPITAL | Age: 73
End: 2023-07-31

## 2023-07-31 DIAGNOSIS — C67.9 MALIGNANT NEOPLASM OF URINARY BLADDER, UNSPECIFIED SITE (HCC): ICD-10-CM

## 2023-07-31 DIAGNOSIS — Z01.810 PREOPERATIVE CARDIOVASCULAR EXAMINATION: ICD-10-CM

## 2023-07-31 DIAGNOSIS — N41.1 CHRONIC PROSTATITIS: ICD-10-CM

## 2023-07-31 DIAGNOSIS — R97.20 ELEVATED PSA: Primary | ICD-10-CM

## 2023-07-31 NOTE — TELEPHONE ENCOUNTER
I called the patient and gave him the results of his MRI-multiparametric MRI of the prostate for PSA of 4.69 with no prior biopsies shows 53 cc gland with a category 5 lesion mid anterior prostate and midline involving the anterior fibromuscular stroma and anterior transition zone. No evidence of extraprostatic tumor, seminal vesicle invasion, pelvic lymphadenopathy or pelvic osseous metastatic disease. I told him there is high suspicion for prostate cancer and I recommend MR fusion prostate biopsy. I explained the procedure to him and he wishes to proceed. We will get this scheduled.

## 2023-08-02 ENCOUNTER — TELEPHONE (OUTPATIENT)
Dept: UROLOGY | Facility: MEDICAL CENTER | Age: 73
End: 2023-08-02

## 2023-08-02 NOTE — TELEPHONE ENCOUNTER
----- Message from Gilford Lory, MD sent at 7/31/2023  9:56 AM EDT -----  Patient notified of MRI-please schedule him for MR fusion transperineal prostate biopsy. Thank you very much.

## 2023-08-02 NOTE — TELEPHONE ENCOUNTER
I spoke to the patient and scheduled his procedure for 9/12/2023 at [address] [facility] with [address]. Jannie Yu.    -instructions given verbally and mailed  -patient aware to be NPO, needs a  and use an enema 1 hour prior to leaving the house morning of procedure  -CBC, CMP, Urine C&S and EKG 2 weeks prior  -PO/PB

## 2023-08-29 ENCOUNTER — APPOINTMENT (OUTPATIENT)
Dept: LAB | Facility: HOSPITAL | Age: 73
End: 2023-08-29
Payer: MEDICARE

## 2023-08-29 DIAGNOSIS — Z01.812 PRE-OPERATIVE LABORATORY EXAMINATION: ICD-10-CM

## 2023-08-29 DIAGNOSIS — R39.89 SUSPECTED UTI: ICD-10-CM

## 2023-08-29 DIAGNOSIS — Z01.810 PRE-OPERATIVE CARDIOVASCULAR EXAMINATION: ICD-10-CM

## 2023-08-29 DIAGNOSIS — R97.20 ELEVATED PSA: ICD-10-CM

## 2023-08-29 LAB
ALBUMIN SERPL BCP-MCNC: 4.3 G/DL (ref 3.5–5)
ALP SERPL-CCNC: 48 U/L (ref 34–104)
ALT SERPL W P-5'-P-CCNC: 15 U/L (ref 7–52)
ANION GAP SERPL CALCULATED.3IONS-SCNC: 5 MMOL/L
AST SERPL W P-5'-P-CCNC: 18 U/L (ref 13–39)
BACTERIA UR QL AUTO: ABNORMAL /HPF
BASOPHILS # BLD AUTO: 0.03 THOUSANDS/ÂΜL (ref 0–0.1)
BASOPHILS NFR BLD AUTO: 1 % (ref 0–1)
BILIRUB SERPL-MCNC: 2.07 MG/DL (ref 0.2–1)
BILIRUB UR QL STRIP: NEGATIVE
BUN SERPL-MCNC: 20 MG/DL (ref 5–25)
CALCIUM SERPL-MCNC: 9 MG/DL (ref 8.4–10.2)
CHLORIDE SERPL-SCNC: 101 MMOL/L (ref 96–108)
CLARITY UR: CLEAR
CO2 SERPL-SCNC: 29 MMOL/L (ref 21–32)
COLOR UR: YELLOW
CREAT SERPL-MCNC: 0.88 MG/DL (ref 0.6–1.3)
EOSINOPHIL # BLD AUTO: 0.1 THOUSAND/ÂΜL (ref 0–0.61)
EOSINOPHIL NFR BLD AUTO: 2 % (ref 0–6)
ERYTHROCYTE [DISTWIDTH] IN BLOOD BY AUTOMATED COUNT: 13.1 % (ref 11.6–15.1)
GFR SERPL CREATININE-BSD FRML MDRD: 85 ML/MIN/1.73SQ M
GLUCOSE P FAST SERPL-MCNC: 103 MG/DL (ref 65–99)
GLUCOSE UR STRIP-MCNC: NEGATIVE MG/DL
HCT VFR BLD AUTO: 41.3 % (ref 36.5–49.3)
HGB BLD-MCNC: 13.4 G/DL (ref 12–17)
HGB UR QL STRIP.AUTO: ABNORMAL
IMM GRANULOCYTES # BLD AUTO: 0.02 THOUSAND/UL (ref 0–0.2)
IMM GRANULOCYTES NFR BLD AUTO: 0 % (ref 0–2)
KETONES UR STRIP-MCNC: NEGATIVE MG/DL
LEUKOCYTE ESTERASE UR QL STRIP: NEGATIVE
LYMPHOCYTES # BLD AUTO: 1.18 THOUSANDS/ÂΜL (ref 0.6–4.47)
LYMPHOCYTES NFR BLD AUTO: 23 % (ref 14–44)
MCH RBC QN AUTO: 29.6 PG (ref 26.8–34.3)
MCHC RBC AUTO-ENTMCNC: 32.4 G/DL (ref 31.4–37.4)
MCV RBC AUTO: 91 FL (ref 82–98)
MONOCYTES # BLD AUTO: 0.7 THOUSAND/ÂΜL (ref 0.17–1.22)
MONOCYTES NFR BLD AUTO: 13 % (ref 4–12)
MUCOUS THREADS UR QL AUTO: ABNORMAL
NEUTROPHILS # BLD AUTO: 3.2 THOUSANDS/ÂΜL (ref 1.85–7.62)
NEUTS SEG NFR BLD AUTO: 61 % (ref 43–75)
NITRITE UR QL STRIP: NEGATIVE
NON-SQ EPI CELLS URNS QL MICRO: ABNORMAL /HPF
NRBC BLD AUTO-RTO: 0 /100 WBCS
PH UR STRIP.AUTO: 5.5 [PH]
PHOSPHATE SERPL-MCNC: 3 MG/DL (ref 2.3–4.1)
PLATELET # BLD AUTO: 199 THOUSANDS/UL (ref 149–390)
PMV BLD AUTO: 9.8 FL (ref 8.9–12.7)
POTASSIUM SERPL-SCNC: 3.8 MMOL/L (ref 3.5–5.3)
PROT SERPL-MCNC: 6.6 G/DL (ref 6.4–8.4)
PROT UR STRIP-MCNC: ABNORMAL MG/DL
RBC # BLD AUTO: 4.53 MILLION/UL (ref 3.88–5.62)
RBC #/AREA URNS AUTO: ABNORMAL /HPF
SODIUM SERPL-SCNC: 135 MMOL/L (ref 135–147)
SP GR UR STRIP.AUTO: 1.02 (ref 1–1.03)
UROBILINOGEN UR STRIP-ACNC: <2 MG/DL
WBC # BLD AUTO: 5.23 THOUSAND/UL (ref 4.31–10.16)
WBC #/AREA URNS AUTO: ABNORMAL /HPF

## 2023-08-29 PROCEDURE — 36415 COLL VENOUS BLD VENIPUNCTURE: CPT

## 2023-08-29 PROCEDURE — 84100 ASSAY OF PHOSPHORUS: CPT

## 2023-08-29 PROCEDURE — 85025 COMPLETE CBC W/AUTO DIFF WBC: CPT

## 2023-08-29 PROCEDURE — 80053 COMPREHEN METABOLIC PANEL: CPT

## 2023-08-29 PROCEDURE — 93010 ELECTROCARDIOGRAM REPORT: CPT

## 2023-08-29 PROCEDURE — 87086 URINE CULTURE/COLONY COUNT: CPT

## 2023-08-30 LAB
ATRIAL RATE: 63 BPM
P AXIS: 67 DEGREES
PR INTERVAL: 168 MS
QRS AXIS: 67 DEGREES
QRSD INTERVAL: 148 MS
QT INTERVAL: 442 MS
QTC INTERVAL: 452 MS
T WAVE AXIS: 9 DEGREES
VENTRICULAR RATE: 63 BPM

## 2023-08-31 LAB — BACTERIA UR CULT: NORMAL

## 2023-09-21 NOTE — PRE-PROCEDURE INSTRUCTIONS
Pre-Surgery Instructions:   Medication Instructions   • Alpha-Lipoic Acid 600 MG CAPS Stop taking 4 days prior to surgery. • bisoprolol-hydrochlorothiazide (ZIAC) 2.5-6.25 MG per tablet Take day of surgery. • Coenzyme Q10-Red Yeast Rice  MG CAPS Stop taking 4 days prior to surgery. • Multiple Vitamins-Minerals (PreserVision AREDS 2) CAPS Stop taking 4 days prior to surgery. • Omega 3 1200 MG CAPS Stop taking 4 days prior to surgery. • Probiotic Product (PROBIOTIC PO) Stop taking 4 days prior to surgery. • sildenafil (VIAGRA) 50 MG tablet Stop taking 1 day prior to surgery. • tamsulosin (FLOMAX) 0.4 mg Take night before surgery   Instructions received for 1 Fleet enema prior to procedure. Medication instructions for day surgery reviewed. Please use only a sip of water to take your instructed medications. Avoid all over the counter vitamins, supplements and NSAIDS for one week prior to surgery per anesthesia guidelines. Tylenol is ok to take as needed. You will receive a call one business day prior to surgery with an arrival time and hospital directions. If your surgery is scheduled on a Monday, the hospital will be calling you on the Friday prior to your surgery. If you have not heard from anyone by 8pm, please call the hospital supervisor through the hospital  at [phone]. Katharine Hager [phone]). Do not eat or drink anything after midnight the night before your surgery, including candy, mints, lifesavers, or chewing gum. Do not drink alcohol 24hrs before your surgery. Try not to smoke at least 24hrs before your surgery. Follow the pre surgery showering instructions as listed in the [facility] Surgical Experience Booklet” or otherwise provided by your surgeon's office. Do not shave the surgical area 24 hours before surgery. Do not apply any lotions, creams, including makeup, cologne, deodorant, or perfumes after showering on the day of your surgery.      No contact lenses, eye make-up, or artificial eyelashes. Remove nail polish, including gel polish, and any artificial, gel, or acrylic nails if possible. Remove all jewelry including rings and body piercing jewelry. Wear causal clothing that is easy to take on and off. Consider your type of surgery. Keep any valuables, jewelry, piercings at home. Please bring any specially ordered equipment (sling, braces) if indicated. Arrange for a responsible person to drive you to and from the hospital on the day of your surgery. Visitor Guidelines discussed. Call the surgeon's office with any new illnesses, exposures, or additional questions prior to surgery. Please reference your [facility] Surgical Experience Booklet” for additional information to prepare for your upcoming surgery.

## 2023-09-22 ENCOUNTER — ANESTHESIA EVENT (OUTPATIENT)
Dept: PERIOP | Facility: HOSPITAL | Age: 73
End: 2023-09-22
Payer: MEDICARE

## 2023-09-25 ENCOUNTER — HOSPITAL ENCOUNTER (OUTPATIENT)
Facility: HOSPITAL | Age: 73
Setting detail: OUTPATIENT SURGERY
Discharge: HOME | End: 2023-09-25
Attending: UROLOGY | Admitting: UROLOGY
Payer: MEDICARE

## 2023-09-25 ENCOUNTER — ANESTHESIA (OUTPATIENT)
Dept: PERIOP | Facility: HOSPITAL | Age: 73
End: 2023-09-25
Payer: MEDICARE

## 2023-09-25 ENCOUNTER — TELEPHONE (OUTPATIENT)
Dept: UROLOGY | Facility: CLINIC | Age: 73
End: 2023-09-25

## 2023-09-25 VITALS
OXYGEN SATURATION: 97 % | DIASTOLIC BLOOD PRESSURE: 64 MMHG | HEIGHT: 70 IN | TEMPERATURE: 97.5 F | RESPIRATION RATE: 16 BRPM | WEIGHT: 167.77 LBS | SYSTOLIC BLOOD PRESSURE: 109 MMHG | HEART RATE: 55 BPM | BODY MASS INDEX: 24.02 KG/M2

## 2023-09-25 DIAGNOSIS — R97.20 ELEVATED PROSTATE SPECIFIC ANTIGEN (PSA): ICD-10-CM

## 2023-09-25 PROCEDURE — G0416 PROSTATE BIOPSY, ANY MTHD: HCPCS | Performed by: PATHOLOGY

## 2023-09-25 PROCEDURE — NC001 PR NO CHARGE: Performed by: UROLOGY

## 2023-09-25 PROCEDURE — 76942 ECHO GUIDE FOR BIOPSY: CPT | Performed by: UROLOGY

## 2023-09-25 PROCEDURE — 55700 PR PROSTATE NEEDLE BIOPSY ANY APPROACH: CPT | Performed by: UROLOGY

## 2023-09-25 RX ORDER — ONDANSETRON 2 MG/ML
INJECTION INTRAMUSCULAR; INTRAVENOUS AS NEEDED
Status: DISCONTINUED | OUTPATIENT
Start: 2023-09-25 | End: 2023-09-25

## 2023-09-25 RX ORDER — FENTANYL CITRATE 50 UG/ML
INJECTION, SOLUTION INTRAMUSCULAR; INTRAVENOUS AS NEEDED
Status: DISCONTINUED | OUTPATIENT
Start: 2023-09-25 | End: 2023-09-25

## 2023-09-25 RX ORDER — PROPOFOL 10 MG/ML
INJECTION, EMULSION INTRAVENOUS AS NEEDED
Status: DISCONTINUED | OUTPATIENT
Start: 2023-09-25 | End: 2023-09-25

## 2023-09-25 RX ORDER — PROPOFOL 10 MG/ML
INJECTION, EMULSION INTRAVENOUS CONTINUOUS PRN
Status: DISCONTINUED | OUTPATIENT
Start: 2023-09-25 | End: 2023-09-25

## 2023-09-25 RX ORDER — CEFAZOLIN SODIUM 1 G/50ML
1000 SOLUTION INTRAVENOUS ONCE
Status: COMPLETED | OUTPATIENT
Start: 2023-09-25 | End: 2023-09-25

## 2023-09-25 RX ORDER — PROMETHAZINE HYDROCHLORIDE 25 MG/ML
6.25 INJECTION, SOLUTION INTRAMUSCULAR; INTRAVENOUS ONCE AS NEEDED
Status: DISCONTINUED | OUTPATIENT
Start: 2023-09-25 | End: 2023-09-25 | Stop reason: HOSPADM

## 2023-09-25 RX ORDER — CEFAZOLIN SODIUM 1 G/50ML
2000 SOLUTION INTRAVENOUS ONCE
Status: DISCONTINUED | OUTPATIENT
Start: 2023-09-25 | End: 2023-09-25 | Stop reason: HOSPADM

## 2023-09-25 RX ORDER — FENTANYL CITRATE/PF 50 MCG/ML
50 SYRINGE (ML) INJECTION
Status: DISCONTINUED | OUTPATIENT
Start: 2023-09-25 | End: 2023-09-25 | Stop reason: HOSPADM

## 2023-09-25 RX ORDER — HYDROMORPHONE HCL/PF 1 MG/ML
0.5 SYRINGE (ML) INJECTION
Status: DISCONTINUED | OUTPATIENT
Start: 2023-09-25 | End: 2023-09-25 | Stop reason: HOSPADM

## 2023-09-25 RX ORDER — ONDANSETRON 2 MG/ML
4 INJECTION INTRAMUSCULAR; INTRAVENOUS ONCE AS NEEDED
Status: DISCONTINUED | OUTPATIENT
Start: 2023-09-25 | End: 2023-09-25 | Stop reason: HOSPADM

## 2023-09-25 RX ORDER — SODIUM CHLORIDE 9 MG/ML
125 INJECTION, SOLUTION INTRAVENOUS CONTINUOUS
Status: DISCONTINUED | OUTPATIENT
Start: 2023-09-25 | End: 2023-09-25 | Stop reason: HOSPADM

## 2023-09-25 RX ORDER — ACETAMINOPHEN 325 MG/1
650 TABLET ORAL EVERY 6 HOURS PRN
Status: DISCONTINUED | OUTPATIENT
Start: 2023-09-25 | End: 2023-09-25 | Stop reason: HOSPADM

## 2023-09-25 RX ORDER — FUROSEMIDE 10 MG/ML
INJECTION INTRAMUSCULAR; INTRAVENOUS AS NEEDED
Status: DISCONTINUED | OUTPATIENT
Start: 2023-09-25 | End: 2023-09-25

## 2023-09-25 RX ORDER — LIDOCAINE HYDROCHLORIDE 20 MG/ML
INJECTION, SOLUTION EPIDURAL; INFILTRATION; INTRACAUDAL; PERINEURAL AS NEEDED
Status: DISCONTINUED | OUTPATIENT
Start: 2023-09-25 | End: 2023-09-25 | Stop reason: HOSPADM

## 2023-09-25 RX ORDER — MEPERIDINE HYDROCHLORIDE 25 MG/ML
12.5 INJECTION INTRAMUSCULAR; INTRAVENOUS; SUBCUTANEOUS ONCE AS NEEDED
Status: DISCONTINUED | OUTPATIENT
Start: 2023-09-25 | End: 2023-09-25 | Stop reason: HOSPADM

## 2023-09-25 RX ORDER — MIDAZOLAM HYDROCHLORIDE 2 MG/2ML
INJECTION, SOLUTION INTRAMUSCULAR; INTRAVENOUS AS NEEDED
Status: DISCONTINUED | OUTPATIENT
Start: 2023-09-25 | End: 2023-09-25

## 2023-09-25 RX ADMIN — FUROSEMIDE 10 MG: 10 INJECTION, SOLUTION INTRAVENOUS at 09:14

## 2023-09-25 RX ADMIN — CEFAZOLIN SODIUM 1000 MG: 1 SOLUTION INTRAVENOUS at 08:50

## 2023-09-25 RX ADMIN — FENTANYL CITRATE 25 MCG: 50 INJECTION INTRAMUSCULAR; INTRAVENOUS at 09:02

## 2023-09-25 RX ADMIN — SODIUM CHLORIDE 125 ML/HR: 0.9 INJECTION, SOLUTION INTRAVENOUS at 07:26

## 2023-09-25 RX ADMIN — MIDAZOLAM 2 MG: 1 INJECTION INTRAMUSCULAR; INTRAVENOUS at 08:51

## 2023-09-25 RX ADMIN — PROPOFOL 100 MCG/KG/MIN: 10 INJECTION, EMULSION INTRAVENOUS at 08:56

## 2023-09-25 RX ADMIN — PROPOFOL 40 MG: 10 INJECTION, EMULSION INTRAVENOUS at 08:56

## 2023-09-25 RX ADMIN — ONDANSETRON 4 MG: 2 INJECTION INTRAMUSCULAR; INTRAVENOUS at 08:59

## 2023-09-25 RX ADMIN — FENTANYL CITRATE 50 MCG: 50 INJECTION INTRAMUSCULAR; INTRAVENOUS at 08:54

## 2023-09-25 NOTE — H&P
UROLOGY H&P NOTE     CHIEF COMPLAINT   Isma Lira is a 68 y.o. male with a complaint of No chief complaint on file. History of Present Illness:   Isma Lira is a 68 y.o. male here for evaluation of evaded PSA. Patient known to Dr. Gerald Suarez with a history of bladder cancer, low-grade papillary. Patient had an elevated PSA and underwent multiparametric MRI which demonstrated a PI-RADS 5 lesion. Patient presents for fusion biopsy.     Lab Results   Component Value Date    PSA 4.69 (H) 06/05/2023    PSA 3.3 07/05/2022    PSA 2.8 09/20/2021       Past Medical History:     Past Medical History:   Diagnosis Date   • Arthritis    • Bladder tumor    • BPH with obstruction/lower urinary tract symptoms    • Cancer [facility])     bladder   • Cold     11/15 symptoms/to call Dr Daren Mancera if they persist or worsen   • Dental crowns present    • Elevated PSA    • Hematuria, microscopic    • History of total left knee replacement     twice one 2011/ and revision 2017   • Hyperlipidemia    • Hypertension    • Irregular heart beat     not on EKGs anymore   • Neuropathy     in feet   • Prediabetes    • Prostatitis, acute 09/18/2017   • Seasonal allergies    • Tooth missing    • Wears glasses        PAST SURGICAL HISTORY:     Past Surgical History:   Procedure Laterality Date   • CARDIAC CATHETERIZATION      approx 15 yrs ago for irreg heart beat at [facility]   • COLONOSCOPY     • CYSTOSCOPY     • CYSTOSCOPY N/A 2/27/2019    Procedure: CYSTOSCOPY;  Surgeon: Gerald Suarez MD;  [facility];  Service: Urology   • HERNIA REPAIR Right     inguinal   • INSTILLATION MYTOMYCIN N/A 4/25/2018    Procedure: INSTILLATION MITOMYCIN;  Surgeon: Gerald Suarez MD;  [facility];  Service: Urology   • JOINT REPLACEMENT Left     knee   • KNEE ARTHROSCOPY Left 1991    ACL   • LIPOMA RESECTION     • NV BLADDER INSTILLATION ANTICARCINOGENIC AGENT N/A 11/29/2017    Procedure: Gelene Read;  Surgeon: Gerald Suarez MD;  [facility];  Service: Urology   • WY BLADDER INSTILLATION ANTICARCINOGENIC AGENT N/A 2/27/2019    Procedure: INSTILLATION MITOMYCIN;  Surgeon: Robyn Hernandez MD;  [facility];  Service: Urology   • WY CYSTO BLADDER W/URETERAL CATHETERIZATION N/A 4/25/2018    Procedure: CYSTOSCOPY WITH RETROGRADE PYELOGRAM;  Surgeon: Robyn Hernandez MD;  [facility];  Service: Urology   • WY CYSTO W/REMOVAL OF LESIONS SMALL N/A 2/27/2019    Procedure: TRANSURETHRAL RESECTION OF BLADDER TUMOR (TURBT); Surgeon: Robyn Hernandez MD;  [facility];  Service: Urology   • WY CYSTOURETHROSCOPY N/A 11/29/2017    Procedure: CYSTOSCOPY;  Surgeon: Robyn Hernandez MD;  [facility];  Service: Urology   • WY CYSTOURETHROSCOPY W/DEST &/RMVL MED BLADDER EDWARD N/A 11/29/2017    Procedure: TRANSURETHRAL RESECTION OF BLADDER TUMOR (TURBT); Surgeon: Robyn Hernandez MD;  [facility];  Service: Urology   • SKIN BIOPSY     • TONSILLECTOMY     • TRANSURETHRAL RESECTION OF BLADDER TUMOR N/A 4/25/2018    Procedure: TRANSURETHRAL RESECTION OF BLADDER TUMOR (TURBT);   Surgeon: Robyn Hernandez MD;  [facility];  Service: Urology       CURRENT MEDICATIONS:     Current Facility-Administered Medications   Medication Dose Route Frequency Provider Last Rate Last Admin   • ceFAZolin (ANCEF) IVPB (premix in dextrose) 1,000 mg 50 mL  1,000 mg Intravenous Once Erasmo Rhoades MD       • ceFAZolin (ANCEF) IVPB (premix in dextrose) 2,000 mg 100 mL  2,000 mg Intravenous Once Robyn Hernandez MD       • sodium chloride 0.9 % infusion  125 mL/hr Intravenous Continuous Priscella Blare,  mL/hr at 09/25/23 0726 125 mL/hr at 09/25/23 0726       ALLERGIES:   No Known Allergies    SOCIAL HISTORY:     Social History     Socioeconomic History   • Marital status: /Civil Union     Spouse name: None   • Number of children: None   • Years of education: None   • Highest education level: None   Occupational History   • None   Tobacco Use • Smoking status: Never   • Smokeless tobacco: Never   Vaping Use   • Vaping Use: Never used   Substance and Sexual Activity   • Alcohol use: Yes     Comment: 1 x a month   • Drug use: No   • Sexual activity: None     Comment: defer   Other Topics Concern   • None   Social History Narrative   • None     Social Determinants of Health     Financial Resource Strain: Not on file   Food Insecurity: Not on file   Transportation Needs: Not on file   Physical Activity: Not on file   Stress: Not on file   Social Connections: Not on file   Intimate Partner Violence: Not on file   Housing Stability: Not on file       SOCIAL HISTORY:     Family History   Problem Relation Age of Onset   • Heart failure Mother    • Stomach cancer Father        REVIEW OF SYSTEMS:     Review of Systems   Constitutional: Negative for chills and fever. HENT: Negative for ear pain and sore throat. Eyes: Negative for pain and visual disturbance. Respiratory: Negative for cough and shortness of breath. Cardiovascular: Negative for chest pain and palpitations. Gastrointestinal: Negative for abdominal pain and vomiting. Genitourinary: Negative for dysuria and hematuria. Musculoskeletal: Negative for arthralgias and back pain. Skin: Negative for color change and rash. Neurological: Negative for seizures and syncope. All other systems reviewed and are negative. PHYSICAL EXAM:     /82   Pulse 56   Temp 98.3 °F (36.8 °C) (Temporal)   Ht 5' 10" (1.778 m)   Wt 76.1 kg (167 lb 12.3 oz)   SpO2 96%   BMI 24.07 kg/m²     Physical Exam  Vitals reviewed. Constitutional:       General: He is not in acute distress. Appearance: He is well-developed. HENT:      Head: Normocephalic and atraumatic. Eyes:      Pupils: Pupils are equal, round, and reactive to light. Cardiovascular:      Rate and Rhythm: Normal rate. Pulmonary:      Effort: Pulmonary effort is normal. No respiratory distress.       Breath sounds: Normal breath sounds. Abdominal:      General: There is no distension. Palpations: Abdomen is soft. Tenderness: There is no abdominal tenderness. Musculoskeletal:         General: Normal range of motion. Cervical back: Normal range of motion and neck supple. Skin:     General: Skin is warm and dry. Neurological:      Mental Status: He is alert and oriented to person, place, and time. Psychiatric:         Behavior: Behavior normal.         LABS:     CBC:   Lab Results   Component Value Date    WBC 5.23 08/29/2023    HGB 13.4 08/29/2023    HCT 41.3 08/29/2023    MCV 91 08/29/2023     08/29/2023       BMP:   Lab Results   Component Value Date    CALCIUM 9.0 08/29/2023    K 3.8 08/29/2023    CO2 29 08/29/2023     08/29/2023    BUN 20 08/29/2023    CREATININE 0.88 08/29/2023         IMAGING:     MULTIPARAMETRIC MRI OF THE PROSTATE WITH AND WITHOUT CONTRAST-WITH 3-D POSTPROCESSING.     INDICATION:   R97.20: Elevated prostate specific antigen (PSA).     COMPARISON: None     PSA LEVEL: 4.69 ng/mL on 6/5/2023. PRIOR BIOPSY DATE: No prior biopsy. BIOPSY RESULTS: Not applicable.     TECHNIQUE: The following pulse sequences were obtained:  Small field-of-view axial T1-weighted and multiplanar T2-weighted images; DWI axial and ADC map; large field of view axial T2 weighted images; T1w in-phase and opposed-phase axials of entire pelvis   and dynamic 3D T1w axial before and during IV contrast injection.        CONTRAST:  Gadobutrol (Gadavist) 7 mL of Gadobutrol injection (SINGLE-DOSE)     TECHNICAL LIMITATIONS: None.     FINDINGS:     PROSTATE:  Size: 5.3 x 4.6 x 4.3 cm = 53 cc. Post-biopsy hemorrhage:  None. Central gland enlargement (BPH): Moderate.     Focal lesions - localization as follows:     Lesion: 1  Size: 2.4 x 1.2 x 1.2 cm, series 4, images 16-19.   Location: Irregular area in the mid anterior prostate at midline involving the anterior fibromuscular stroma and anterior transition zone.  T2-weighted images: Score 5: Lenticular or noncircumscribed, homogeneous, moderately hypointense but greater than or equal to 1.5 cm in greatest dimension or definite extraprostatic extension/invasive behavior. Diffusion-weighted images: Score 5: Focal markedly hypointense on ADC and markedly hyperintense on high b-value DWI, but greater than or equal to 1.5 cm in greatest dimension or definite extraprostatic extension/invasive behavior. Dynamic post-contrast images: (-) Lesion that does not enhance early compared to the surrounding prostate or enhances diffusely so that the margins of the enhancing area do not correspond to a finding on T2-weighted images and/or DWI. PI-RADS Assessment Category: 5, Very high (clinically significant cancer is highly likely to be present). Extra-prostatic extension (EPE): Abuts capsule without visualized EPE.     SEMINAL VESICLES: Unremarkable     Note: Clinically significant cancer is defined on pathology/histology as Omak score greater than or equal to 7, and/or volume of greater than or equal to 0.5 mL, and/or extraprostatic extension.     URINARY BLADDER: Unremarkable.     LYMPH NODES: No pelvic lymphadenopathy.     BONES: No suspicious osseous lesion.     OTHER: Postsurgical changes in the left inguinal region. Partially imaged bilateral parapelvic cysts.     IMPRESSION:     1. PI-RADSv2.1 Category 5 - Very high (clinically significant cancer is highly likely to be present). Irregular T2 hypointense area in the mid anterior prostate at midline involving the anterior fibromuscular stroma and anterior transition zone.     2.  No extraprostatic tumor, seminal vesicle invasion, pelvic lymphadenopathy, or pelvic osseous metastatic disease.     3. Calculated prostate volume of 53 cc.     ASSESSMENT:     68 y.o. male  with low-grade papillary noninvasive bladder cancer, elevated PSA and PI-RADS 5 lesion    PLAN:     Patient understands the plan for transperineal fusion biopsy of the prostate. Risk and benefits discussed and described including bleeding, infection and urinary retention. Informed consent signed.

## 2023-09-25 NOTE — ANESTHESIA POSTPROCEDURE EVALUATION
Post-Op Assessment Note    CV Status:  Stable    Pain management: adequate     Mental Status:  Alert and awake   Hydration Status:  Euvolemic   PONV Controlled:  Controlled   Airway Patency:  Patent   Two or more mitigation strategies used for obstructive sleep apnea   Post Op Vitals Reviewed: Yes      Staff: Anesthesiologist         No notable events documented.     BP      Temp      Pulse     Resp      SpO2      /64   Pulse 55   Temp 97.5 °F (36.4 °C) (Temporal)   Resp 16   Ht 5' 10" (1.778 m)   Wt 76.1 kg (167 lb 12.3 oz)   SpO2 97%   BMI 24.07 kg/m²

## 2023-09-25 NOTE — TELEPHONE ENCOUNTER
----- Message from Franklyn Tompkins MD sent at 9/25/2023  9:31 AM EDT -----  Patient will need postoperative pathology visit with Dr. Brad Leon in 2 weeks after today's transperineal fusion biopsy

## 2023-09-25 NOTE — ANESTHESIA PREPROCEDURE EVALUATION
Review of Systems/Medical History  Patient summary reviewed. Chart reviewed. No history of anesthetic complications     Cardiovascular  EKG reviewed., Hyperlipidemia, Hypertension controlled,   Comment: Known RBBB,  Pulmonary  Negative pulmonary ROS        GI/Hepatic  Negative GI/hepatic ROS          Negative  ROS Genitourinary malignancy Bladder cancer,        Endo/Other     GYN       Hematology  Negative hematology ROS      Musculoskeletal    Arthritis     Neurology  Negative neurology ROS      Psychology   Negative psychology ROS              Physical Exam    Airway    Mallampati score: I  TM Distance: >3 FB  Neck ROM: full     Dental       Cardiovascular  Rhythm: regular, Rate: normal, Cardiovascular exam normal    Pulmonary  Pulmonary exam normal Breath sounds clear to auscultation,     Other Findings        Anesthesia Plan  ASA Score- 2     Anesthesia Type- IV sedation with anesthesia with ASA Monitors. Additional Monitors:   Airway Plan:     Comment: GA prn. Plan Factors-    Chart reviewed. EKG reviewed. Existing labs reviewed. Patient summary reviewed. Patient is not a current smoker. Patient instructed to abstain from smoking on day of procedure. Patient did not smoke on day of surgery. There is medical exclusion for perioperative obstructive sleep apnea risk education. Induction- intravenous. Postoperative Plan-     Informed Consent- Anesthetic plan and risks discussed with patient and spouse (Letty).

## 2023-09-25 NOTE — OP NOTE
OPERATIVE REPORT  PATIENT NAME: Roe Gonzalez    :  1950  MRN: [id number]  Pt [facility]    SURGERY DATE: 2023    Surgeon(s) and Role:     * Kellie Delvalle MD - Primary    Preop Diagnosis:  Elevated prostate specific antigen (PSA) [R97.20]    Post-Op Diagnosis Codes:     * Elevated prostate specific antigen (PSA) [R97.20]    Procedure(s):  TRANSPERINEAL MRI FUSION BIOPSY PROSTATE    Specimen(s):  ID Type Source Tests Collected by Time Destination   1 : RIGHT ANTERIOR MEDIAL Tissue Prostate TISSUE EXAM Kellie Delvalle MD 2023 09    2 : Right Anterior lateral Tissue Prostate TISSUE EXAM Kellie Delvalle MD 2023 09    3 : right base Tissue Prostate TISSUE EXAM Kellie Delvalle MD 2023 09    4 : right posterior medial Tissue Prostate TISSUE EXAM Kellie Delvalle MD 2023 09    5 : Left base Tissue Prostate TISSUE EXAM Kellie Delvalle MD 2023 09    6 : left posterior medial Tissue Prostate TISSUE EXAM Kellie Delvalle MD 2023 09    7 : Left posterior lateral Tissue Prostate TISSUE EXAM Kellie Delvalle MD 2023 09    8 : left anterior lateral Tissue Prostate TISSUE EXAM Kellie Delvalle MD 2023 09    9 : Left Anterior Medial Tissue Prostate TISSUE EXAM Kellie Delvalle MD 2023 09    10 : Right posterior lateral Tissue Prostate TISSUE EXAM Kellie Delvalle MD 2023 09    11 :  anterior region of interest Tissue Prostate TISSUE EXAM Kellie Delvalle MD 2023 0911        Estimated Blood Loss:   Minimal    Drains:  * No LDAs found *    Anesthesia Type:   IV Sedation with Anesthesia    Operative Indications:  Elevated prostate specific antigen (PSA) [R97.20]      Operative Findings:  1.   Standard transperineal biopsy with saturation  2.  7 cores taken from the anterior region of interest  3.  27 total cores    Complications:   None    Procedure and Technique:  Procedure was transperineal saturation prostate biopsy with MRI fusion sampling of lesion or lesions utilizing the Precision Point perineal access device utilized to map the standard geographic sites of the prostate. Tu Mills is a 68 y.o. male with history of elevated PSA and abnormal MRI. Patient has not undergone prior biopsy of the prostate. Patient did undergo pre biopsy multiparametric MRI of the prostate. There were 1 lesions with highest PIRADs rating 5 so the patient was scheduled for transperineal saturation biopsy with addition with MRI fusion of the abnormal lesions. The patient was scheduled for his procedure in an outpatient surgical context with the assistance of the anesthesia team for sedation. He was met in the preoperative holding area by the performing urologist, the anesthesia team and the intraoperative nursing staff. The procedure along with its risks and benefits were discussed and reviewed. Patient signed an informed consent. Patient was then transferred to procedure suite. Pre-procedural time out was performed with all parties present. He was treated with periprocedural antibiotics, ancef. He was placed in dorsal lithotomy with care to pad all pressure points. His perineum and genitalia were shave/prepped with chloraprep. The scrotum was elevated a secured aware from the perineum above the rectum. Side-fire, biplanar transrectal ultrasound was introduced and the prostate was imaged in the sagittal and axial views. With the assistance of the [address] [place] [address] technician, a survey of the prostate anatomy was performed. The technician then linked the previously obtained MRI images to the real-time ultrasound. The PIRADs lesions seen on MRI were identified on the ultrasound. In the midportion of the left and right prostate, a rito was denoted in the perineal skin. Skin wheal was elevated with 2% lidocaine on either side.     The PrecisionPoint access needle was then introduced into the left perineal subcutaneous tissue. We visualized the tip of the needle. Spinal needle was introduced through the subcutaneous fat and into the pelvic floor muscle where a perineal pudendal block was performed with additional local anesthetic. This was repeated on the patient's right side. Utilizing the Precision Point access needle and stepper, ultrasound guidance was utilized to place the spring-loaded biopsy needle into MRI lesions. The first lesion that was a PIRADs 5 in the anterior prostate. 7 samples were taken. We confirmed good position of the needle strikes utilizing the fusion software. We now moved to take our standard transperineal samples, which allowed us to carefully map and saturate each of the 10 zones that have subdivided the prostate into zonal anatomy. The Precision Point access needle and stepper was positioned into the RIGHT perineal space and ultrasound guidance was utilized to place the spring-loaded biopsy needle into the following areas    RIGHT anterior medial: 2 biopsies taken  RIGHT posterior medial: 2 biopsies taken  RIGHT posterior lateral: 2 biopsies taken  RIGHT base: 2 biopsies taken  RIGHT anterior lateral: 2 biopsies taken  The Precision Point access needle and stepper was re-positioned into the LEFT perineal space and ultrasound guidance was utilized to place the spring-loaded biopsy needle into the following areas  LEFT anterior medial: 2 biopsies taken  LEFT posterior medial: 2 biopsies taken  LEFT posterior lateral: 2 biopsies taken  LEFT base: 2 biopsies taken  LEFT anterior lateral: 2 biopsies taken  LEFT anterior medial: 2 biopsies taken    A total of 27 biopsies (including standard transperineal saturation and additional fusion biopsy):27    Transrectal ultrasound removed. The scrotum was released. Some gentle pressure was placed on the perineum for approximately 5 minutes for hemostasis.     At the completion of the procedure, the patient was taken out of dorsal lithotomy, recovered from their anesthesia, and transferred to PACU in good condition. Overall, the patient tolerated the procedure and there were no complications.     Plan: The patient will be monitored in recovery, allowed to void prior to discharge and return for biopsy pathology review in the office with their primary urologist.      Patient Disposition:  PACU         SIGNATURE: Erasmo Rhoades MD  DATE: September 25, 2023  TIME: 9:28 AM

## 2023-09-25 NOTE — DISCHARGE INSTR - AVS FIRST PAGE
Today you underwent a prostate biopsy. It is normal to have some blood in your urine and/or stool over the next 48 to 72 hours. You should rest today and tomorrow and hydrate aggressively. The bleeding should clear or improve in this timeframe. If the bleeding worsens, our office should be contacted. However, it is normal to have blood in your semen or rust colored semen for up to a month. This is not cause for concern. If there is any concern for fevers/chills or infection signs or symptoms, our office should be contacted immediately. Please drink copious fluids and rest for the first 48 hours after your procedure. Ice can be applied to the perineal skin which is the skin between your scrotum and anus for comfort and inflammation control. Tylenol or acetaminophen can be used over-the-counter for low level discomfort.     Fish oil can restart in 48 to 72 hours after bleeding subsides

## 2023-09-26 PROCEDURE — G0416 PROSTATE BIOPSY, ANY MTHD: HCPCS | Performed by: PATHOLOGY

## 2023-09-26 NOTE — TELEPHONE ENCOUNTER
Post Op Note    Dusty Russell is a 68 y.o. male s/p TRANSPERINEAL MRI FUSION BIOPSY PROSTATE (Perineum) performed 9/25/2023 by Dr. De Estrella. Dusty Russell is a patient of Dr. Dr. Ange Zhou. How would you rate your pain on a scale from 1 to 10, 10 being the worst pain ever? 0     Have you had a fever? No     Do you have any difficulty urinating? No     Do you have any other questions or concerns that I can address at this time? Patient reports doing very well- denies pain or hematuria and is urinating without difficulty. Patient scheduled for pathology review with Dr. Ange Zhou in [facility] on 10/9 @ 11:30 am. Office address provided.

## 2023-09-27 DIAGNOSIS — C61 PROSTATE CANCER (HCC): Primary | ICD-10-CM

## 2023-10-03 ENCOUNTER — DOCUMENTATION (OUTPATIENT)
Dept: HEMATOLOGY ONCOLOGY | Facility: CLINIC | Age: 73
End: 2023-10-03

## 2023-10-05 PROBLEM — R73.03 PREDIABETES: Status: ACTIVE | Noted: 2021-01-10

## 2023-10-05 PROBLEM — C61 PROSTATE CANCER (HCC): Status: ACTIVE | Noted: 2023-09-25

## 2023-10-05 PROBLEM — N39.0 ACUTE UTI: Status: ACTIVE | Noted: 2017-11-20

## 2023-10-05 PROBLEM — R07.9 CHEST PAIN: Status: ACTIVE | Noted: 2021-01-10

## 2023-10-05 PROBLEM — N41.0 ACUTE PROSTATITIS: Status: ACTIVE | Noted: 2017-10-18

## 2023-10-05 PROBLEM — R31.0 GROSS HEMATURIA: Status: ACTIVE | Noted: 2017-09-18

## 2023-10-05 PROBLEM — D49.59 PROSTATE TUMOR: Status: ACTIVE | Noted: 2023-09-25

## 2023-10-05 PROBLEM — R97.20 ELEVATED PSA: Status: ACTIVE | Noted: 2017-09-18

## 2023-10-05 PROBLEM — E78.00 PURE HYPERCHOLESTEROLEMIA: Status: ACTIVE | Noted: 2021-01-10

## 2023-10-09 ENCOUNTER — OFFICE VISIT (OUTPATIENT)
Dept: UROLOGY | Facility: CLINIC | Age: 73
End: 2023-10-09
Payer: MEDICARE

## 2023-10-09 VITALS
DIASTOLIC BLOOD PRESSURE: 72 MMHG | BODY MASS INDEX: 24.74 KG/M2 | HEART RATE: 64 BPM | WEIGHT: 172.8 LBS | OXYGEN SATURATION: 99 % | HEIGHT: 70 IN | SYSTOLIC BLOOD PRESSURE: 112 MMHG | TEMPERATURE: 98.3 F | RESPIRATION RATE: 16 BRPM

## 2023-10-09 DIAGNOSIS — C61 PROSTATE CANCER (HCC): Primary | ICD-10-CM

## 2023-10-09 DIAGNOSIS — C67.1 MALIGNANT NEOPLASM OF DOME OF URINARY BLADDER (HCC): ICD-10-CM

## 2023-10-09 PROCEDURE — 99213 OFFICE O/P EST LOW 20 MIN: CPT | Performed by: UROLOGY

## 2023-10-09 NOTE — PROGRESS NOTES
[address]  [facility]  [address] [place] [address]  [phone]  [email or web address]      NAME: Adelina Moise  AGE: 68 y.o. SEX: male  : 1950   MRN: [id number]    DATE: 10/9/2023  TIME: 12:04 PM    Assessment and Plan:    Prostate cancer as below: He will see Dr. Chris Chahal this week and most likely start radiation therapy. We will see him back for gold fiducial markers and SpaceOAR. PSA with a 6 months. Bladder cancer: Due for his next 6 cystoscopy in April which will actually be pretty close to the next months after completion of radiation. We will see him for both PSA and cystoscopy at that time. Chief Complaint     Chief Complaint   Patient presents with   • Follow-up       History of Present Illness   Prostate cancer: Underwent transperineal prostate biopsy for elevated PSA 2023 by Dr. Le Moser. He has Tracy 3 posterior equal 6 prostate cancer the right anterior medial, [place] 3+4 equal 7 left anterior medial, [place] 3+4 equal 7 anterior region of interest by MRI. I have already called him with results. He is most interested in radiation. Prebiopsy PSA was 4.69 multiparametric MRI showed 53 cc gland with a category 5 lesion mid anterior prostate and midline involving the anterior fibromuscular stroma and anterior transition zone. No evidence of any spread. He has an appointment with radiation oncology this week with Dr. Chris Chahal. We reviewed the fact that I do not believe he is a candidate for RALP due to his age and he has done some homework. We discussed the pros and cons of radiation therapy which he will discuss further with Dr. Chris Chahal. Bladder cancer: Status post TURBT of recurrence of bladder cancer 2019 with mitomycin. 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   Previous TURBT was April 25, 2018 with posterior wall tumor and a tumor of low malignant potential at the left ureteral orifice. Underwent 6 weeks of BCG after the recurrence. Last cystoscopy was by me April 13, 2023 which was negative.           The following portions of the patient's history were reviewed and updated as appropriate: allergies, current medications, past family history, past medical history, past social history, past surgical history and problem list.  Past Medical History:   Diagnosis Date   • Arthritis    • Bladder cancer ([address]) 11/29/17    superficial   • Bladder tumor    • BPH with obstruction/lower urinary tract symptoms    • Cancer [facility])     bladder   • Cold     11/15 symptoms/to call Dr Hamilton Lopez if they persist or worsen   • Dental crowns present    • Elevated PSA    • Hematuria, microscopic    • History of total left knee replacement     twice one 2011/ and revision 2017   • Hyperlipidemia    • Hypertension    • Irregular heart beat     not on EKGs anymore   • Neuropathy     in feet   • Prediabetes    • Prostatitis, acute 09/18/2017   • Seasonal allergies    • Tooth missing    • Wears glasses      Past Surgical History:   Procedure Laterality Date   • CARDIAC CATHETERIZATION      approx 15 yrs ago for irreg heart beat at [facility]   • COLONOSCOPY     • CYSTOSCOPY     • CYSTOSCOPY N/A 2/27/2019    Procedure: CYSTOSCOPY;  Surgeon: Roman Garcia MD;  [facility];  Service: Urology   • HERNIA REPAIR Right     inguinal   • INSTILLATION MYTOMYCIN N/A 4/25/2018    Procedure: INSTILLATION MITOMYCIN;  Surgeon: Roman Garcia MD;  [facility];  Service: Urology   • JOINT REPLACEMENT Left     knee   • KNEE ARTHROSCOPY Left 1991    ACL   • LIPOMA RESECTION     • MN BLADDER INSTILLATION ANTICARCINOGENIC AGENT N/A 11/29/2017    Procedure: Lary Ceballos;  Surgeon: Roman Garcia MD;  [facility];  Service: Urology   • MN BLADDER INSTILLATION ANTICARCINOGENIC AGENT N/A 2/27/2019    Procedure: INSTILLATION MITOMYCIN;  Surgeon: Red Ayala MD;  [facility];  Service: Urology   • MT BX PROSTATE STRTCTC SATURATION SAMPLING IMG GID N/A 9/25/2023    Procedure: TRANSPERINEAL MRI FUSION BIOPSY PROSTATE;  Surgeon: Ramya Irving MD;  [facility];  Service: Urology   • MT CYSTO BLADDER W/URETERAL CATHETERIZATION N/A 4/25/2018    Procedure: CYSTOSCOPY WITH RETROGRADE PYELOGRAM;  Surgeon: Red Ayala MD;  [facility];  Service: Urology   • MT CYSTO W/REMOVAL OF LESIONS SMALL N/A 2/27/2019    Procedure: TRANSURETHRAL RESECTION OF BLADDER TUMOR (TURBT); Surgeon: Red Ayala MD;  [facility];  Service: Urology   • MT CYSTOURETHROSCOPY N/A 11/29/2017    Procedure: CYSTOSCOPY;  Surgeon: Red Ayaal MD;  [facility];  Service: Urology   • MT CYSTOURETHROSCOPY W/DEST &/RMVL MED BLADDER EDWARD N/A 11/29/2017    Procedure: TRANSURETHRAL RESECTION OF BLADDER TUMOR (TURBT); Surgeon: Red Ayala MD;  [facility];  Service: Urology   • SKIN BIOPSY     • TONSILLECTOMY     • TRANSURETHRAL RESECTION OF BLADDER TUMOR N/A 4/25/2018    Procedure: TRANSURETHRAL RESECTION OF BLADDER TUMOR (TURBT);   Surgeon: Red Ayala MD;  [facility];  Service: Urology     shoulder  Review of Systems   Review of Systems    Active Problem List     Patient Active Problem List   Diagnosis   • Malignant neoplasm of posterior wall of urinary bladder ([address])   • Malignant neoplasm of dome of urinary bladder ([address])   • Bladder tumor   • Tooth missing   • Dental crowns present   • Hypertension   • Hyperlipidemia   • Elevated PSA   • Gross hematuria   • Prediabetes   • Pure hypercholesterolemia   • Acute prostatitis   • Acute UTI   • Chest pain   • Prostate cancer (HCC)       Objective   /72 (BP Location: Right arm, Patient Position: Sitting, Cuff Size: Adult)   Pulse 64   Temp 98.3 °F (36.8 °C) (Temporal)   Resp 16   Ht 5' 10" (1.778 m)   Wt 78.4 kg (172 lb 12.8 oz)   SpO2 99% BMI 24.79 kg/m²     Physical Exam        Current Medications     Current Outpatient Medications:   •  Alpha-Lipoic Acid 600 MG CAPS, Take 600 mg by mouth daily  , Disp: , Rfl:   •  amoxicillin (AMOXIL) 500 MG tablet, Take 500 mg by mouth daily as needed (one hour prior to dental appt), Disp: , Rfl:   •  bisoprolol-hydrochlorothiazide (ZIAC) 2.5-6.25 MG per tablet, Take 1 tablet by mouth daily, Disp: , Rfl:   •  Coenzyme Q10-Red Yeast Rice  MG CAPS, Take by mouth 50/1200 dose, Disp: , Rfl:   •  Multiple Vitamins-Minerals (PreserVision AREDS 2) CAPS, Take by mouth in the morning Vision supplement, Disp: , Rfl:   •  Probiotic Product (PROBIOTIC PO), Take 1 capsule by mouth daily, Disp: , Rfl:   •  sildenafil (VIAGRA) 50 MG tablet, 50 mg daily as needed, Disp: , Rfl: 3  •  tamsulosin (FLOMAX) 0.4 mg, Take 0.4 mg by mouth daily with dinner  , Disp: , Rfl:   Face to face 20 minutes      Roman Garcia MD

## 2023-10-09 NOTE — PATIENT INSTRUCTIONS
See me in 6 months after completion of treatment with a PSA and we will do cystoscopy as well because of follow-up for bladder cancer.

## 2023-10-11 ENCOUNTER — TELEPHONE (OUTPATIENT)
Dept: RADIATION ONCOLOGY | Facility: CLINIC | Age: 73
End: 2023-10-11

## 2023-10-11 ENCOUNTER — CLINICAL SUPPORT (OUTPATIENT)
Dept: RADIATION ONCOLOGY | Facility: CLINIC | Age: 73
End: 2023-10-11
Attending: RADIOLOGY

## 2023-10-11 ENCOUNTER — RADIATION ONCOLOGY CONSULT (OUTPATIENT)
Dept: RADIATION ONCOLOGY | Facility: CLINIC | Age: 73
End: 2023-10-11
Attending: RADIOLOGY

## 2023-10-11 VITALS
TEMPERATURE: 98.2 F | OXYGEN SATURATION: 100 % | SYSTOLIC BLOOD PRESSURE: 116 MMHG | HEART RATE: 63 BPM | WEIGHT: 171.08 LBS | HEIGHT: 70 IN | BODY MASS INDEX: 24.49 KG/M2 | DIASTOLIC BLOOD PRESSURE: 73 MMHG

## 2023-10-11 DIAGNOSIS — C61 PROSTATE CANCER (HCC): Primary | ICD-10-CM

## 2023-10-11 RX ORDER — PYRIDOXINE HCL (VITAMIN B6) 100 MG
1 TABLET ORAL DAILY
COMMUNITY

## 2023-10-11 RX ORDER — BACITRACIN 500 UNIT/G
320 OINTMENT (GRAM) TOPICAL DAILY
COMMUNITY

## 2023-10-11 NOTE — PROGRESS NOTES
Tonia Easley 1950 is a 68 y.o. male With recently diagnosed prostate cancer Tracy 7 (3+4), referred by Dr. Real Matthews. Presents today for consult to discuss radiation treatment. 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 Underwent  TURBT 4/25/2018, followed by 6 weeks of BCG. Had recurrence of bladder cancer and completed repeat TURBT on 2/22/19 followed by another 6 weeks of BCG. Currently on surveillance with annual cystoscopy. Additional medical problems include HTN, prediabetes. 4/13/23  Dr. Jm Rand cystoscopy performed. Repeat cystoscopy in 1 year. No recurrence on surveillance this year. Repeat PSA. PSA, Total   Latest Ref Rng 0.00 - 4.00 ng/mL   8/9/2019 3.8    8/28/2020 3.1    9/20/2021 2.8    7/5/2022 3.3    6/5/2023 4.69 (H)         7/11/23 Dr. Real Matthews (Telemedicine)  Currently is having no urination issues. PSA was up to 4.94 March 22, 2022, and was thought to have prostatitis at the time- treated with antibiotics. Recent PSA is elevated at 4.69 . Discussed Multiparametric MRI of the prostate. F/u in 4/2024 for repeat cysto-bladder ca.     7/25/23 MRI prostate  IMPRESSION:  1. PI-RADSv2.1 Category 5 - Very high (clinically significant cancer is highly likely to be present). Irregular T2 hypointense area in the mid anterior prostate at midline involving the anterior fibromuscular stroma and anterior transition zone. 2. No extraprostatic tumor, seminal vesicle invasion, pelvic lymphadenopathy, or pelvic osseous metastatic disease. 3. Calculated prostate volume of 53 cc.      9/25/23 Prostate biopsy  Final Diagnosis   A. Prostate, Right Anterior medial:  - Prostatic adenocarcinoma, acinar type, [place] score 3 + 3 = 6, Prognostic Grade Group 1, continuously involving 1 of 3 cores (40%, 0%, 0%). - Perineural invasion: Not identified.      B. Prostate, Right Anterior lateral:  - Benign prostate tissue. C. Prostate, right base:  - Benign prostate tissue. D. Prostate, right posterior medial:  - Benign prostate tissue. E. Prostate, Left base:  - Benign prostate tissue. F. Prostate, left posterior medial:  - Benign prostate tissue. G. Prostate, Left posterior lateral:  - Benign prostate tissue. H. Prostate, left anterior lateral:  - Benign prostate tissue. I. Prostate, Left Anterior Medial:  - Prostatic adenocarcinoma, acinar type, [place] score 3 + 4 = 7, Prognostic Grade Group 2, continuously involving 1 of 3 cores (90%, 0%, 0%). - Percentage of [place] pattern 4: 10%  - Perineural invasion: Not identified. J. Prostate, Right posterior lateral:  - Benign prostate tissue. K. Prostate,  anterior region of interest:  - Prostatic adenocarcinoma, acinar type, [place] score 3 + 4 = 7, Prognostic Grade Group 2, discontinuously involving 2 of 7 cores (80%, 20%, 0%, 0%, 0%, 0%, 0%). - Percentage of [place] pattern 4: 10%  - Perineural invasion: Not identified. 10/9/23 Urology  To see Rad Onc this week, likely start radiation therapy. We will see him back for gold fiducial markers and SpaceOAR. PSA in 6 months. Bladder cancer: Due for his next cystoscopy in April. We will see him for both PSA and cystoscopy at that time. [id number]    Oncology History   Prostate cancer [facility])   2023 Initial Diagnosis    Prostate cancer ([address])         Review of Systems:  Review of Systems   Constitutional: Negative. Negative for unexpected weight change. HENT: Negative. Eyes: Negative. Glasses   Respiratory: Negative. Cardiovascular: Negative. Gastrointestinal: Negative. Endocrine: Negative. Genitourinary: Negative. Negative for dysuria, hematuria and urgency. Musculoskeletal:  Positive for arthralgias (right ankle/ left shoulder). Skin: Negative. Allergic/Immunologic: Negative.     Neurological:  Positive for numbness (left shoulder). Hematological: Negative. Psychiatric/Behavioral: Negative. Clinical Trial: no    IPSS Questionnaire (AUA-7): Over the past month…    1)  How often have you had a sensation of not emptying your bladder completely after you finish urinating? 0 - Not at all   2)  How often have you had to urinate again less than two hours after you finished urinating? 0 - Not at all   3)  How often have you found you stopped and started again several times when you urinated? 0 - Not at all   4) How difficult have you found it to postpone urination? 0 - Not at all   5) How often have you had a weak urinary stream?  0 - Not at all   6) How often have you had to push or strain to begin urination? 0 - Not at all   7) How many times did you most typically get up to urinate from the time you went to bed until the time you got up in the morning?   1 - 1 time   Total Score:  1       Pain assessment: 0      Prior Radiation: none    Teaching  [facility] radiation oncology booklet given and reviewed    MST completed    Implantable Devices (Port, pacemaker, pain stimulator): none    Hip Replacement: none    Health Maintenance   Topic Date Due    Hepatitis C Screening  Never done    Medicare Annual Wellness Visit (AWV)  Never done    Depression Screening  Never done    Colorectal Cancer Screening  Never done    Fall Risk  Never done    Pneumococcal Vaccine: 65+ Years (1 - PCV) Never done    COVID-19 Vaccine (6 - Moderna series) 02/28/2023    Influenza Vaccine (1) 09/01/2023    BMI: Adult  10/09/2024    HIB Vaccine  Aged Out    IPV Vaccine  Aged Out    Hepatitis A Vaccine  Aged Out    Meningococcal ACWY Vaccine  Aged Out    HPV Vaccine  Aged Out       Past Medical History:   Diagnosis Date    Arthritis     Bladder cancer ([address]) 11/29/17    superficial    Bladder tumor     BPH with obstruction/lower urinary tract symptoms     Cancer ([address])     bladder    Cold     11/15 symptoms/to call Dr Donita King if they persist or worsen    Dental crowns present     Elevated PSA     Hematuria, microscopic     History of total left knee replacement     twice one 2011/ and revision 2017    Hyperlipidemia     Hypertension     Irregular heart beat     not on EKGs anymore    Neuropathy     in feet    Prediabetes     Prostatitis, acute 09/18/2017    Seasonal allergies     Tooth missing     Wears glasses        Past Surgical History:   Procedure Laterality Date    CARDIAC CATHETERIZATION      approx 15 yrs ago for irreg heart beat at [address] [facility] [facility] N/A 2/27/2019    Procedure: CYSTOSCOPY;  Surgeon: Harsh Medrano MD;  [facility];  Service: Urology    HERNIA REPAIR Right     inguinal    INSTILLATION MYTOMYCIN N/A 4/25/2018    Procedure: INSTILLATION MITOMYCIN;  Surgeon: Harsh Medrano MD;  [facility];  Service: Urology    JOINT REPLACEMENT Left     knee    KNEE ARTHROSCOPY Left 1991    ACL    [address] ANTICARCINOGENIC AGENT N/A 11/29/2017    Procedure: Farshad Turner;  Surgeon: Harsh Medrano MD;  [facility];  Service: Urology    GA BLADDER INSTILLATION ANTICARCINOGENIC AGENT N/A 2/27/2019    Procedure: Farshad Turenr;  Surgeon: Harsh Medrano MD;  [facility];  Service: Urology     [address] IMG GID N/A 9/25/2023    Procedure: TRANSPERINEAL MRI FUSION BIOPSY PROSTATE;  Surgeon: Penny Le MD;  [facility];  Service: Urology    GA CYSTO BLADDER Anahi Gin CATHETERIZATION N/A 4/25/2018    Procedure: CYSTOSCOPY WITH RETROGRADE PYELOGRAM;  Surgeon: Harsh Medrano MD;  [facility];  Service: Urology    GA CYSTO W/REMOVAL OF LESIONS SMALL N/A 2/27/2019    Procedure: TRANSURETHRAL RESECTION OF BLADDER TUMOR (TURBT);   Surgeon: Harsh Medrano MD;  [facility];  Service: Urology    GA CYSTOURETHROSCOPY N/A 11/29/2017    Procedure: CYSTOSCOPY;  Surgeon: Harsh Medrano MD;  [facility]; Service: Urology    LA CYSTOURETHROSCOPY W/DEST &/RMVL MED BLADDER EDWARD N/A 11/29/2017    Procedure: TRANSURETHRAL RESECTION OF BLADDER TUMOR (TURBT); Surgeon: Benjie Ramirez MD;  [facility];  Service: Urology    SKIN BIOPSY      TONSILLECTOMY      TRANSURETHRAL RESECTION OF BLADDER TUMOR N/A 4/25/2018    Procedure: TRANSURETHRAL RESECTION OF BLADDER TUMOR (TURBT); Surgeon: Benjie Ramirez MD;  [facility];  Service: Urology       Family History   Problem Relation Age of Onset    Heart failure Mother     Stomach cancer Father     Cancer Father         stomach    Heart attack Father     Hypertension Father        Social History     Tobacco Use    Smoking status: Never    Smokeless tobacco: Never   Vaping Use    Vaping Use: Never used   Substance Use Topics    Alcohol use: Not Currently     Comment: very infrequent social drinker    Drug use: No          Current Outpatient Medications:     Alpha-Lipoic Acid 600 MG CAPS, Take 600 mg by mouth daily  , Disp: , Rfl:     amoxicillin (AMOXIL) 500 MG tablet, Take 500 mg by mouth daily as needed (one hour prior to dental appt), Disp: , Rfl:     bisoprolol-hydrochlorothiazide (ZIAC) 2.5-6.25 MG per tablet, Take 1 tablet by mouth daily, Disp: , Rfl:     Coenzyme Q10-Red Yeast Rice  MG CAPS, Take by mouth 50/1200 dose, Disp: , Rfl:     Multiple Vitamins-Minerals (PreserVision AREDS 2) CAPS, Take by mouth in the morning Vision supplement, Disp: , Rfl:     Probiotic Product (PROBIOTIC PO), Take 1 capsule by mouth daily, Disp: , Rfl:     sildenafil (VIAGRA) 50 MG tablet, 50 mg daily as needed, Disp: , Rfl: 3    tamsulosin (FLOMAX) 0.4 mg, Take 0.4 mg by mouth daily with dinner  , Disp: , Rfl:     No Known Allergies     There were no vitals filed for this visit.

## 2023-10-11 NOTE — TELEPHONE ENCOUNTER
Patient has been seen for RT consult by Dr. Bakari Thompson. He is requesting  fiducial markers and SpaceOar. NO ADT   Next follow up appointment with Dr. Celestino Daniels  is 4/11/2024.

## 2023-10-11 NOTE — PROGRESS NOTES
Consultation - Radiation Oncology     GURPREET:[id number] : 1950  Encounter: [id number]  Patient Information: [facility]  Chief Complaint   Patient presents with    Consult     Cancer Staging   Prostate cancer [facility])  Staging form: Prostate, AJCC 8th Edition  - Clinical stage from 10/11/2023: Stage IIB (cT1c, cN0, cM0, PSA: 4.7, Grade Group: 2) - Signed by Sean Hood MD on 10/11/2023  Histopathologic type: Adenocarcinoma, NOS  Stage prefix: Initial diagnosis  Prostate specific antigen (PSA) range: Less than 10  [place] primary pattern: 3  [place] secondary pattern: 4  [place] score: 7  Histologic grading system: 5 grade system  Location of positive needle core biopsies: Both sides           History of Present Illness   Tu Mills is a 68y.o. year old male who presents with recently diagnosed prostate cancer Tracy 7 (3+4), referred by Dr. Jenn Odonnell. Presents today for consult to discuss radiation treatment. 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 Underwent  TURBT 2018, followed by 6 weeks of BCG. Had recurrence of bladder cancer and completed repeat TURBT on 19 followed by another 6 weeks of BCG. Currently on surveillance with annual cystoscopy. Additional medical problems include HTN, prediabetes. 23  Dr. Sallie Barroso cystoscopy performed. Repeat cystoscopy in 1 year. No recurrence on surveillance this year. Repeat PSA. PSA, Total   Latest Ref Rng 0.00 - 4.00 ng/mL   2019 3.8    2020 3.1    2021 2.8    2022 3.3    2023 4.69 (H)       23 Dr. Jenn Odonnell (Telemedicine)  Currently is having no urination issues. PSA was up to 4.94 2022, and was thought to have prostatitis at the time- treated with antibiotics. Recent PSA is elevated at 4.69 . Discussed Multiparametric MRI of the prostate.  F/u in 2024 for repeat cysto-bladder ca.      7/25/23 MRI prostate  IMPRESSION:  1. PI-RADSv2.1 Category 5 - Very high (clinically significant cancer is highly likely to be present). Irregular T2 hypointense area in the mid anterior prostate at midline involving the anterior fibromuscular stroma and anterior transition zone. 2. No extraprostatic tumor, seminal vesicle invasion, pelvic lymphadenopathy, or pelvic osseous metastatic disease. 3. Calculated prostate volume of 53 cc.     9/25/23 Prostate biopsy - first biopsy  Final Diagnosis   A. Prostate, Right Anterior medial:  - Prostatic adenocarcinoma, acinar type, Herndon score 3 + 3 = 6, Prognostic Grade Group 1, continuously involving 1 of 3 cores (40%, 0%, 0%). - Perineural invasion: Not identified. B. Prostate, Right Anterior lateral:  - Benign prostate tissue. C. Prostate, right base:  - Benign prostate tissue. D. Prostate, right posterior medial:  - Benign prostate tissue. E. Prostate, Left base:  - Benign prostate tissue. F. Prostate, left posterior medial:  - Benign prostate tissue. G. Prostate, Left posterior lateral:  - Benign prostate tissue. H. Prostate, left anterior lateral:  - Benign prostate tissue. I. Prostate, Left Anterior Medial:  - Prostatic adenocarcinoma, acinar type, Herndon score 3 + 4 = 7, Prognostic Grade Group 2, continuously involving 1 of 3 cores (90%, 0%, 0%). - Percentage of [place] pattern 4: 10%  - Perineural invasion: Not identified. J. Prostate, Right posterior lateral:  - Benign prostate tissue. K. Prostate,  anterior region of interest:  - Prostatic adenocarcinoma, acinar type, Herndon score 3 + 4 = 7, Prognostic Grade Group 2, discontinuously involving 2 of 7 cores (80%, 20%, 0%, 0%, 0%, 0%, 0%). - Percentage of Tracy pattern 4: 10%  - Perineural invasion: Not identified. 10/9/23 Urology  To see Rad Onc this week, likely start radiation therapy.   We will see him back for gold fiducial markers and SpaceOAR. PSA in 6 months. Bladder cancer: Due for his next cystoscopy in April. We will see him for both PSA and cystoscopy at that time. Patient seen for consultation today with his wife. He lives at home with his wife and dog that he frequently takes for a walk. He remains active. Overall, he has been feeling well. He has nocturia x1 with no hematuria nor dysuria. His weight has been stable and he reports a good appetite. He is still sexually active and can get erections without Viagra. He has Viagra 50 mg listed but reports he does not use this much. He denies any bony aches or pains. He denies any previous history of cancer including no skin cancer. He denies any previous radiation therapy. He has not received any previous systemic chemotherapy. He has received intravesical BCG twice with the last dose in 2019. He has a history of BPH and has been on tamsulosin for 10 to 15 years and saw palmetto for about the last 5 years. He is a retired  for [employer] [address] [place] [address]. He and his wife have 4 sons that are healthy ages 40, 37, 55, and 52. They have 5 granddaughters and 2 grandsons. His father  from metastatic gastric carcinoma at the age of 79. He had attempted surgery but then was closed due to the metastasis and  shortly thereafter. His mother  at the age of 80 in a nursing home with dementia and coronary artery disease. He has no brothers. He had 1 sister who  at the age of 79 from some sort of blood cancer/leukemia. He has never smoked any tobacco. He drinks alcohol on social occasions.     Upcomin2023 Dr. Song Syed PCP  24 Dr. Evelyn Roberts Urology    Historical Information   Oncology History   Prostate cancer [facility])   2023 Initial Diagnosis    Prostate cancer ([address] )     10/11/2023 -  Cancer Staged    Staging form: Prostate, AJCC 8th Edition  - Clinical stage from 10/11/2023: Stage IIB (cT1c, cN0, cM0, PSA: 4.7, Grade Group: 2) - Signed by Max Mayer Gemma De La Cruz MD on 10/11/2023  Histopathologic type:  Adenocarcinoma, NOS  Stage prefix: Initial diagnosis  Prostate specific antigen (PSA) range: Less than 10  [place] primary pattern: 3  [place] secondary pattern: 4  [place] score: 7  Histologic grading system: 5 grade system  Location of positive needle core biopsies: Both sides         Past Medical History:   Diagnosis Date    Arthritis     Bladder cancer ([address]) 11/29/17    superficial    Bladder tumor     BPH with obstruction/lower urinary tract symptoms     Cancer ([address])     bladder    Cold     11/15 symptoms/to call Dr Donita King if they persist or worsen    Dental crowns present     Elevated PSA     Hematuria, microscopic     History of total left knee replacement     twice one 2011/ and revision 2017    Hyperlipidemia     Hypertension     Irregular heart beat     not on EKGs anymore    Neuropathy     in feet    Prediabetes     Prostate cancer ([address])     Prostatitis, acute 09/18/2017    Seasonal allergies     Tooth missing     Wears glasses      Past Surgical History:   Procedure Laterality Date    CARDIAC CATHETERIZATION      approx 15 yrs ago for irreg heart beat at [address] [facility] [place] N/A 2/27/2019    Procedure: CYSTOSCOPY;  Surgeon: Bharathi Schmitz MD;  [facility];  Service: Urology    HERNIA REPAIR Right     inguinal    INSTILLATION MYTOMYCIN N/A 4/25/2018    Procedure: INSTILLATION MITOMYCIN;  Surgeon: Bharathi Schmitz MD;  [facility];  Service: Urology    JOINT REPLACEMENT Left     knee    KNEE ARTHROSCOPY Left 1991    ACL    [address] ANTICARCINOGENIC AGENT N/A 11/29/2017    Procedure: Molly Beard;  Surgeon: Bharathi Schmitz MD;  [facility];  Service: Urology    MT BLADDER INSTILLATION ANTICARCINOGENIC AGENT N/A 2/27/2019    Procedure: Molly Beard;  Surgeon: Bharathi Schmitz MD;  [facility];  Service: Urology    MT [address] [facility] SATURATION SAMPLING IMG GID N/A 9/25/2023    Procedure: TRANSPERINEAL MRI FUSION BIOPSY PROSTATE;  Surgeon: Sylwia Noyola MD;  [facility];  Service: Urology    OR CYSTO BLADDER Susana Stare CATHETERIZATION N/A 4/25/2018    Procedure: CYSTOSCOPY WITH RETROGRADE PYELOGRAM;  Surgeon: Karri Lovelace MD;  [facility];  Service: Urology    OR CYSTO W/REMOVAL OF LESIONS SMALL N/A 2/27/2019    Procedure: TRANSURETHRAL RESECTION OF BLADDER TUMOR (TURBT); Surgeon: Karri Lovelace MD;  [facility];  Service: Urology    OR CYSTOURETHROSCOPY N/A 11/29/2017    Procedure: CYSTOSCOPY;  Surgeon: Karri Lovelace MD;  [facility];  Service: Urology    OR CYSTOURETHROSCOPY W/DEST &/RMVL MED BLADDER EDWARD N/A 11/29/2017    Procedure: TRANSURETHRAL RESECTION OF BLADDER TUMOR (TURBT); Surgeon: Karri Lovelace MD;  [facility];  Service: Urology    SKIN BIOPSY      TONSILLECTOMY      TRANSURETHRAL RESECTION OF BLADDER TUMOR N/A 4/25/2018    Procedure: TRANSURETHRAL RESECTION OF BLADDER TUMOR (TURBT);   Surgeon: Karri Lovelace MD;  [facility];  Service: Urology       Family History   Problem Relation Age of Onset    Heart failure Mother     Stomach cancer Father 79    Heart attack Father     Hypertension Father     Cancer Sister 76        blood cancer     Social History   Social History     Substance and Sexual Activity   Alcohol Use Not Currently    Comment: very infrequent social drinker     Social History     Substance and Sexual Activity   Drug Use Never     Social History     Tobacco Use   Smoking Status Never   Smokeless Tobacco Never     Meds/Allergies     Current Outpatient Medications:     Alpha-Lipoic Acid 600 MG CAPS, Take 600 mg by mouth daily  , Disp: , Rfl:     bisoprolol-hydrochlorothiazide (ZIAC) 2.5-6.25 MG per tablet, Take 1 tablet by mouth daily, Disp: , Rfl:     Coenzyme Q10-Red Yeast Rice  MG CAPS, Take by mouth 50/1200 dose, Disp: , Rfl:     Cranberry 500 MG CAPS, Take 1 capsule by mouth in the morning, Disp: , Rfl:     Multiple Vitamins-Minerals (PreserVision AREDS 2) CAPS, Take by mouth in the morning Vision supplement, Disp: , Rfl:     Omega-3 Fatty Acids (FISH OIL OMEGA-3 PO), Take 1 tablet by mouth in the morning, Disp: , Rfl:     Probiotic Product (PROBIOTIC PO), Take 1 capsule by mouth daily, Disp: , Rfl:     Saw Palmetto 160 MG CAPS, Take 320 mg by mouth in the morning, Disp: , Rfl:     sildenafil (VIAGRA) 50 MG tablet, 50 mg daily as needed, Disp: , Rfl: 3    tamsulosin (FLOMAX) 0.4 mg, Take 0.4 mg by mouth daily with dinner  , Disp: , Rfl:     amoxicillin (AMOXIL) 500 MG tablet, Take 500 mg by mouth daily as needed (one hour prior to dental appt) (Patient not taking: Reported on 10/11/2023), Disp: , Rfl:   No Known Allergies    Review of Systems  Constitutional: Negative. Negative for unexpected weight change. HENT: Negative. Eyes: Negative. Glasses   Respiratory: Negative. Cardiovascular: Negative. Gastrointestinal: Negative. Endocrine: Negative. Genitourinary: Negative. Negative for dysuria, hematuria and urgency. Musculoskeletal:  Positive for arthralgias (right ankle/ left shoulder). Skin: Negative. Allergic/Immunologic: Negative. Neurological:  Positive for numbness (left shoulder). Hematological: Negative. Psychiatric/Behavioral: Negative. Clinical Trial: no     IPSS Questionnaire (AUA-7): Over the past month…     1)  How often have you had a sensation of not emptying your bladder completely after you finish urinating? 0 - Not at all   2)  How often have you had to urinate again less than two hours after you finished urinating? 0 - Not at all   3)  How often have you found you stopped and started again several times when you urinated? 0 - Not at all   4) How difficult have you found it to postpone urination?   0 - Not at all   5) How often have you had a weak urinary stream?  0 - Not at all   6) How often have you had to push or strain to begin urination? 0 - Not at all   7) How many times did you most typically get up to urinate from the time you went to bed until the time you got up in the morning? 1 - 1 time   Total Score:  1     OBJECTIVE:   /73 (BP Location: Left arm, Patient Position: Sitting, Cuff Size: Large)   Pulse 63   Temp 98.2 °F (36.8 °C) (Temporal)   Ht 5' 10" (1.778 m)   Wt 77.6 kg (171 lb 1.2 oz)   SpO2 100%   BMI 24.55 kg/m²   Pain Assessment:  0  Performance Status: ECOG/Zubrod/WHO: 0 - Asymptomatic    Physical Exam  Vitals and nursing note reviewed. Constitutional:       General: He is not in acute distress. Appearance: He is well-developed. He is not diaphoretic. HENT:      Head: Normocephalic and atraumatic. Mouth/Throat:      Pharynx: No oropharyngeal exudate. Eyes:      General: No scleral icterus. Conjunctiva/sclera: Conjunctivae normal.      Pupils: Pupils are equal, round, and reactive to light. Neck:      Thyroid: No thyromegaly. Trachea: No tracheal deviation. Cardiovascular:      Rate and Rhythm: Normal rate and regular rhythm. Heart sounds: Normal heart sounds. Pulmonary:      Effort: Pulmonary effort is normal. No respiratory distress. Breath sounds: Normal breath sounds. No wheezing or rales. Abdominal:      General: Bowel sounds are normal. There is no distension. Palpations: Abdomen is soft. There is no mass. Tenderness: There is no abdominal tenderness. Genitourinary:     Comments: Rectal examination deferred. Musculoskeletal:         General: No swelling or tenderness. Normal range of motion. Cervical back: Normal range of motion and neck supple. Lymphadenopathy:      Cervical: No cervical adenopathy. Upper Body:      Right upper body: No supraclavicular adenopathy. Left upper body: No supraclavicular adenopathy. Lower Body: No right inguinal adenopathy. No left inguinal adenopathy. Skin:     General: Skin is warm and dry. Coloration: Skin is not jaundiced or pale. Findings: No erythema or rash. Neurological:      General: No focal deficit present. Mental Status: He is alert and oriented to person, place, and time. Cranial Nerves: No cranial nerve deficit. Coordination: Coordination normal.   Psychiatric:         Mood and Affect: Mood normal.         Behavior: Behavior normal.         Thought Content: Thought content normal.         Judgment: Judgment normal.       RESULTS  Lab Results  Lab Results   Component Value Date    PSA 4.69 (H) 06/05/2023    PSA 3.3 07/05/2022    PSA 2.8 09/20/2021     Imaging Studies: See Above    Pathology: See Above    ASSESSMENT  1. Prostate cancer [facility])  Ambulatory Referral to Radiation Oncology    Radiation Simulation Treatment        Cancer Staging   Prostate cancer [facility])  Staging form: Prostate, AJCC 8th Edition  - Clinical stage from 10/11/2023: Stage IIB (cT1c, cN0, cM0, PSA: 4.7, Grade Group: 2) - Signed by Darell Goetz MD on 10/11/2023  Histopathologic type: Adenocarcinoma, NOS  Stage prefix: Initial diagnosis  Prostate specific antigen (PSA) range: Less than 10  [place] primary pattern: 3  [place] secondary pattern: 4  [place] score: 7  Histologic grading system: 5 grade system  Location of positive needle core biopsies: Both sides      Risk Category: moderate  Bone Scan: no  Androgen Deprivation Therapy: no      PLAN/DISCUSSION  Orders Placed This Encounter   Procedures    Radiation Simulation Treatment          Ricky Colbert is a 68y.o. year old male with a clinical stage IIB, T1c, N0, M0 [place] score 3+4 = 7 prostate adenocarcinoma diagnosed after an elevated PSA of 4.69 NG/mL on June 5, 2023. He had MRI of the prostate July 25, 2023 that was category 5 with highly significant cancer likely to be present in the mid anterior prostate and midline.   There was no evidence of extraprostatic tumor with no seminal vesicle invasion, no pelvic lymphadenopathy, and no pelvic osseous metastatic disease. The calculated prostate volume was 53 cc. He saw Dr. Raphael Stewart and had his first prostate biopsies on September 25, 2023. He had biopsy cores positive bilaterally with Hobbs score 3+4 = 7 and 3+3 = 6 disease including the region of interest.  He has recovered well from biopsy. Hew is having no significant urinary obstructive complaints. He does have a prior history of a low-grade papillary urothelial carcinoma status post TURBT followed by 6 weeks of BCG twice, once in 2018 and again in February 2019 with no recurrence of his bladder carcinoma on his most recent cystoscopy on April 13, 2023. He is seen for consultation for definitive radiation therapy for his prostate adenocarcinoma. We discussed treatment options to include observation, surgical resection, and definitive radiation therapy. Given his age, he understands that surgery is not a good option furthermore he does not wish to pursue surgery. He is not comfortable with observation. We discussed definitive external beam radiation therapy using intensity modulated radiation therapy along with daily image guided radiation therapy with the placement of fiducial markers. We also discussed placement of a SpaceOAR to reduce dose to the rectum. We discussed the acute side effects and the potential chronic complications of definitive radiation therapy with him. He does consent to proceed with fiducial marker placement and SpaceOAR to be followed by definitive radiation therapy using a hypofractionated course of treatment in the [facility] over 5.5 weeks/28 fractions. Since he has Hobbs score 3+4 = 7 grade group 2 disease, we recommend definitive radiation therapy alone without the use of androgen deprivation therapy.       Moni Duarte MD  10/11/2023,11:46 AM      Portions of the record may have been created with voice recognition software. Occasional wrong word or "sound a like" substitutions may have occurred due to the inherent limitations of voice recognition software. Read the chart carefully and recognize, using context, where substitutions have occurred.

## 2023-10-13 ENCOUNTER — DOCUMENTATION (OUTPATIENT)
Dept: HEMATOLOGY ONCOLOGY | Facility: CLINIC | Age: 73
End: 2023-10-13

## 2023-10-13 NOTE — PROGRESS NOTES
Patient will be receiving RT with Dr Hu Bernardo. Per Dr Hu Bernardo patient needs SpaceOAR/Markers and No ADT. Message was sent to urology surgery scheduler to schedule SpaceOAR/Markers. I will follow up in the near future for date.

## 2023-10-16 ENCOUNTER — PATIENT OUTREACH (OUTPATIENT)
Dept: CASE MANAGEMENT | Facility: HOSPITAL | Age: 73
End: 2023-10-16

## 2023-10-16 ENCOUNTER — TELEPHONE (OUTPATIENT)
Age: 73
End: 2023-10-16

## 2023-10-16 NOTE — TELEPHONE ENCOUNTER
Patient of Dr. Hamilton Lopez in [facility] [place]    Patient called stating he is to scheduled Space Or markings for radiation. He is to call the  to have it done.     Patient can be reached at [phone]

## 2023-10-25 ENCOUNTER — DOCUMENTATION (OUTPATIENT)
Dept: HEMATOLOGY ONCOLOGY | Facility: CLINIC | Age: 73
End: 2023-10-25

## 2023-10-25 NOTE — PROGRESS NOTES
Patient is scheduled for SpaceOAR/Markers on 11/20/23. Message sent to RAD ONC office to schedule SIM. I will follow up on date in the near future.

## 2023-10-26 ENCOUNTER — DOCUMENTATION (OUTPATIENT)
Dept: HEMATOLOGY ONCOLOGY | Facility: CLINIC | Age: 73
End: 2023-10-26

## 2023-10-26 ENCOUNTER — PATIENT OUTREACH (OUTPATIENT)
Dept: CASE MANAGEMENT | Facility: HOSPITAL | Age: 73
End: 2023-10-26

## 2023-10-26 NOTE — PROGRESS NOTES
Chart reviewed. Patients SpaceOAR/Makers were rescheduled to 12/12/23. Message was sent to RAD ONC office to schedule [facility] accordingly. I will follow up on SIM date in the future.

## 2023-10-26 NOTE — PROGRESS NOTES
Biopsychosocial and Barriers Assessment    Cancer Diagnosis: Prostate  Home/Cell Phone: [phone]  Emergency Contact: Letty   Marital Status:    Interpretation concerns, speaks another language, preferred language:None  Cultural concerns: None reported   Ability to read or write: yes    Caregiver/Support: Patient reported supportive spouse, 2 sons reside in [place], talks to sons who reside out of state ([place] [facility] and [place]), good network for friends and a Sikh family. Children: Patient has 4 sons   Child/Elder care: none    Housing: House  Home Setup:  2 floor home, no difficulty navigating the home  Lives With: spouse  Daily Living Activities: independent   Durable Medical Equipment: none  Ambulation:  independent     Preferred Pharmacy: [facility] co-pays with insurance: none   High co-pays with medication coverage: none  No medication coverage: has coverage     Primary Care Provider: Dr. Luis Angel Camacho   Hx of [address]: none  Hx of Short term rehab:  none  Mental Health Hx:  none  Substance Abuse Hx:  none  Employment: retired    Status/Location: no  Ability to pay bills: no difficulty  POA/LW/AD: has, will bring into office to scan into chart   Transportation Plan/Concerns: none      What do you know about your Cancer Diagnosis    What has your doctor told you about your cancer diagnosis:  Prostate cancer that's curable   What has your doctor told you about your cancer treatment:  Will have surgery on 12/12/23 and then see Rad/onc on 12/19 for [facility]  What specific concerns do you have about your diagnosis and treatment:  Patient reported initially he was nervous and upset but after speaking with Dr. Hortensia Baum and Dr. Aleksandr Gupta, he feels better. Have you been made aware of any hair loss associated with treatment:    Additional Comments:  OSW outreached to patient to complete assessment and DT. Patient self-rated 0/10 on DT.  Patient denies physical, emotional, social, practical, spiritual concerns. Patient reported that he has good family/friends/Religious support. Patient denies needs at this time but was encouraged to reach out for assistance if needed.

## 2023-10-26 NOTE — TELEPHONE ENCOUNTER
Patient called to ask if SS could check available surgery dates for his procedure in two additional locations. Pt wife read reviews to where pt is currently scheduled for surgery and both wife and pt are concerned. Pt first choice is the [facility] location. Pt second choice is [place].     Call back [phone]

## 2023-10-27 ENCOUNTER — PATIENT OUTREACH (OUTPATIENT)
Dept: HEMATOLOGY ONCOLOGY | Facility: CLINIC | Age: 73
End: 2023-10-27

## 2023-11-02 ENCOUNTER — DOCUMENTATION (OUTPATIENT)
Dept: HEMATOLOGY ONCOLOGY | Facility: CLINIC | Age: 73
End: 2023-11-02

## 2023-11-02 NOTE — PROGRESS NOTES
Patient is scheduled for Radiation SIM on 12/19/23. I will make sure patient follows back up with urology 3-6 months after completion of radiation with PSA prior to the visit.

## 2023-11-07 ENCOUNTER — PATIENT OUTREACH (OUTPATIENT)
Dept: HEMATOLOGY ONCOLOGY | Facility: CLINIC | Age: 73
End: 2023-11-07

## 2023-11-07 NOTE — PROGRESS NOTES
Outreach made to patient using home phone number. I received message that my call cannot be completed at this time. I was unable to LM. I called cell and LM introducing myself as  Oncology Care Coordinator. I asked patient to return my call to go over general questions, further introduce myself, and explain my role in his care. I gave patient my direct line.

## 2023-11-10 ENCOUNTER — PATIENT OUTREACH (OUTPATIENT)
Dept: HEMATOLOGY ONCOLOGY | Facility: CLINIC | Age: 73
End: 2023-11-10

## 2023-11-10 ENCOUNTER — DOCUMENTATION (OUTPATIENT)
Dept: HEMATOLOGY ONCOLOGY | Facility: CLINIC | Age: 73
End: 2023-11-10

## 2023-11-10 NOTE — PROGRESS NOTES
Chart reviewed. Patients SpaceOAR/Marker procedure was moved from 12/12/23 to 12/13/23. Message was sent to RAD ONC office to address if his scheduled SIM date for 12/19/23 is still appropriate.

## 2023-11-10 NOTE — PROGRESS NOTES
Outreach made to patient. I spoke with patient and introduced myself and explained my role. We went over general assessment questions together. I will me maiing him the oncology genetics pamphlet to him to look over and call if he is interested. He does not have nay further questions or concerns at this time but knows I remain available if any arise.

## 2023-11-15 ENCOUNTER — TELEPHONE (OUTPATIENT)
Dept: HEMATOLOGY ONCOLOGY | Facility: CLINIC | Age: 73
End: 2023-11-15

## 2023-11-15 NOTE — TELEPHONE ENCOUNTER
Patient called to schedule with Genetics for a new cancer DX. I put a referral in for the patient and forwarded the referral to Victor. The patient thanked me and verbalized his understanding that somebody will be in contact with him.

## 2023-11-26 ENCOUNTER — HOSPITAL ENCOUNTER (EMERGENCY)
Facility: HOSPITAL | Age: 73
Discharge: HOME | End: 2023-11-26
Attending: EMERGENCY MEDICINE
Payer: MEDICARE

## 2023-11-26 ENCOUNTER — APPOINTMENT (EMERGENCY)
Dept: ULTRASOUND IMAGING | Facility: HOSPITAL | Age: 73
End: 2023-11-26
Payer: MEDICARE

## 2023-11-26 VITALS
BODY MASS INDEX: 24.42 KG/M2 | WEIGHT: 170.19 LBS | DIASTOLIC BLOOD PRESSURE: 72 MMHG | HEART RATE: 62 BPM | TEMPERATURE: 97.8 F | RESPIRATION RATE: 18 BRPM | OXYGEN SATURATION: 100 % | SYSTOLIC BLOOD PRESSURE: 119 MMHG

## 2023-11-26 DIAGNOSIS — N45.3 EPIDIDYMOORCHITIS: Primary | ICD-10-CM

## 2023-11-26 LAB
ANION GAP SERPL CALCULATED.3IONS-SCNC: 8 MMOL/L
BACTERIA UR QL AUTO: ABNORMAL /HPF
BASOPHILS # BLD AUTO: 0.02 THOUSANDS/ÂΜL (ref 0–0.1)
BASOPHILS NFR BLD AUTO: 0 % (ref 0–1)
BILIRUB UR QL STRIP: NEGATIVE
BUN SERPL-MCNC: 23 MG/DL (ref 5–25)
CALCIUM SERPL-MCNC: 9 MG/DL (ref 8.4–10.2)
CHLORIDE SERPL-SCNC: 103 MMOL/L (ref 96–108)
CLARITY UR: CLEAR
CO2 SERPL-SCNC: 24 MMOL/L (ref 21–32)
COLOR UR: YELLOW
CREAT SERPL-MCNC: 1.32 MG/DL (ref 0.6–1.3)
EOSINOPHIL # BLD AUTO: 0.02 THOUSAND/ÂΜL (ref 0–0.61)
EOSINOPHIL NFR BLD AUTO: 0 % (ref 0–6)
ERYTHROCYTE [DISTWIDTH] IN BLOOD BY AUTOMATED COUNT: 12.3 % (ref 11.6–15.1)
GFR SERPL CREATININE-BSD FRML MDRD: 53 ML/MIN/1.73SQ M
GLUCOSE SERPL-MCNC: 109 MG/DL (ref 65–140)
GLUCOSE UR STRIP-MCNC: NEGATIVE MG/DL
HCT VFR BLD AUTO: 37.5 % (ref 36.5–49.3)
HGB BLD-MCNC: 12.5 G/DL (ref 12–17)
HGB UR QL STRIP.AUTO: ABNORMAL
IMM GRANULOCYTES # BLD AUTO: 0.03 THOUSAND/UL (ref 0–0.2)
IMM GRANULOCYTES NFR BLD AUTO: 0 % (ref 0–2)
KETONES UR STRIP-MCNC: NEGATIVE MG/DL
LEUKOCYTE ESTERASE UR QL STRIP: NEGATIVE
LYMPHOCYTES # BLD AUTO: 0.83 THOUSANDS/ÂΜL (ref 0.6–4.47)
LYMPHOCYTES NFR BLD AUTO: 10 % (ref 14–44)
MCH RBC QN AUTO: 29.6 PG (ref 26.8–34.3)
MCHC RBC AUTO-ENTMCNC: 33.3 G/DL (ref 31.4–37.4)
MCV RBC AUTO: 89 FL (ref 82–98)
MONOCYTES # BLD AUTO: 1.27 THOUSAND/ÂΜL (ref 0.17–1.22)
MONOCYTES NFR BLD AUTO: 16 % (ref 4–12)
NEUTROPHILS # BLD AUTO: 5.89 THOUSANDS/ÂΜL (ref 1.85–7.62)
NEUTS SEG NFR BLD AUTO: 74 % (ref 43–75)
NITRITE UR QL STRIP: NEGATIVE
NON-SQ EPI CELLS URNS QL MICRO: ABNORMAL /HPF
NRBC BLD AUTO-RTO: 0 /100 WBCS
PH UR STRIP.AUTO: 6 [PH] (ref 4.5–8)
PLATELET # BLD AUTO: 222 THOUSANDS/UL (ref 149–390)
PMV BLD AUTO: 9.3 FL (ref 8.9–12.7)
POTASSIUM SERPL-SCNC: 4.1 MMOL/L (ref 3.5–5.3)
PROT UR STRIP-MCNC: ABNORMAL MG/DL
RBC # BLD AUTO: 4.22 MILLION/UL (ref 3.88–5.62)
RBC #/AREA URNS AUTO: ABNORMAL /HPF
SODIUM SERPL-SCNC: 135 MMOL/L (ref 135–147)
SP GR UR STRIP.AUTO: >=1.03 (ref 1–1.03)
UROBILINOGEN UR QL STRIP.AUTO: 0.2 E.U./DL
WBC # BLD AUTO: 8.06 THOUSAND/UL (ref 4.31–10.16)
WBC #/AREA URNS AUTO: ABNORMAL /HPF

## 2023-11-26 PROCEDURE — 76870 US EXAM SCROTUM: CPT

## 2023-11-26 PROCEDURE — 87086 URINE CULTURE/COLONY COUNT: CPT

## 2023-11-26 PROCEDURE — 36415 COLL VENOUS BLD VENIPUNCTURE: CPT | Performed by: EMERGENCY MEDICINE

## 2023-11-26 PROCEDURE — 99284 EMERGENCY DEPT VISIT MOD MDM: CPT

## 2023-11-26 PROCEDURE — 99284 EMERGENCY DEPT VISIT MOD MDM: CPT | Performed by: EMERGENCY MEDICINE

## 2023-11-26 PROCEDURE — 81001 URINALYSIS AUTO W/SCOPE: CPT

## 2023-11-26 PROCEDURE — 85025 COMPLETE CBC W/AUTO DIFF WBC: CPT | Performed by: EMERGENCY MEDICINE

## 2023-11-26 PROCEDURE — 80048 BASIC METABOLIC PNL TOTAL CA: CPT | Performed by: EMERGENCY MEDICINE

## 2023-11-26 RX ORDER — KETOROLAC TROMETHAMINE 30 MG/ML
15 INJECTION, SOLUTION INTRAMUSCULAR; INTRAVENOUS ONCE
Status: DISCONTINUED | OUTPATIENT
Start: 2023-11-26 | End: 2023-11-26 | Stop reason: HOSPADM

## 2023-11-26 NOTE — DISCHARGE INSTRUCTIONS
Epididymo-Orchitis   WHAT YOU NEED TO KNOW:   Epididymo-orchitis is inflammation of your epididymis and testicle. The epididymis is a coiled tube inside your scrotum. It stores and carries sperm from your testicles to your penis. Epididymo-orchitis usually affects the epididymis and testicle on one side, but it may affect both sides. DISCHARGE INSTRUCTIONS:   Return to the emergency department if:   Your symptoms become worse even after you start treatment with medicine. Contact your healthcare provider if:   Your symptoms do not get better within 3 days of treatment or come back after treatment. You have a worsening hot, red, tender area on your testicles. Medicines:     Self-care:   Apply ice  on your testicles for 15 to 20 minutes every hour or as directed. Use an ice pack, or put crushed ice in a plastic bag. Cover it with a towel. Ice helps prevent tissue damage and decreases swelling and pain. Elevate your scrotum when you sit or lie down to help reduce swelling and pain. You can do this by placing a rolled-up towel under your scrotum. An athletic supporter provides scrotal support and may make you more comfortable when you stand. Do not lift heavy objects. You can make swelling worse if you lift heavy objects or strain.

## 2023-11-26 NOTE — ED PROVIDER NOTES
History  Chief Complaint   Patient presents with    Groin Pain     Pt complains of right groin pain that started Thursday. Pt states right testicle swelling noted this morning. Yesterday he was playing football with grandson, mainly just throwing back and forth.  burning with urination noted that he received antibiotics for. Prostate cx biopsy in Oct.      72 yo M c/o pain and swelling of right testicle, which he first noticed this morning, after experiencing pain in the right groin area, and dysuria 1 week ago, for which his PCP started Bactrim after a call in, which he has been taking. The urinary discomfort resolved, and then he had the onset of testicle swelling today. He denies fever, chills, N/V, or diarrhea. He denies hematuria. He denies injury . History provided by:  Patient      Prior to Admission Medications   Prescriptions Last Dose Informant Patient Reported? Taking?    Alpha-Lipoic Acid 600 MG CAPS  Self Yes No   Sig: Take 600 mg by mouth daily     Coenzyme Q10-Red Yeast Rice  MG CAPS  Self Yes No   Sig: Take by mouth  dose   Cranberry 500 MG CAPS  Self Yes No   Sig: Take 1 capsule by mouth in the morning   Multiple Vitamins-Minerals (PreserVision AREDS 2) CAPS  Self Yes No   Sig: Take by mouth in the morning Vision supplement   Omega-3 Fatty Acids (FISH OIL OMEGA-3 PO)  Self Yes No   Sig: Take 1 tablet by mouth in the morning   Probiotic Product (PROBIOTIC PO)  Self Yes No   Sig: Take 1 capsule by mouth daily   Saw [place] 160 MG CAPS  Self Yes No   Sig: Take 320 mg by mouth in the morning   amoxicillin (AMOXIL) 500 MG tablet  Self Yes No   Sig: Take 500 mg by mouth daily as needed (one hour prior to dental appt)   Patient not taking: Reported on 10/11/2023   bisoprolol-hydrochlorothiazide (ZIAC) 2.5-6.25 MG per tablet  Self Yes No   Sig: Take 1 tablet by mouth daily   sildenafil (VIAGRA) 50 MG tablet  Self Yes No   Si mg daily as needed   tamsulosin (FLOMAX) 0.4 mg Self Yes No   Sig: Take 0.4 mg by mouth daily with dinner        Facility-Administered Medications: None       Past Medical History:   Diagnosis Date    Arthritis     Bladder cancer ([address] [facility]) 11/29/17    superficial    Bladder tumor     BPH with obstruction/lower urinary tract symptoms     Cancer [facility])     bladder    Cold     11/15 symptoms/to call Dr Donita King if they persist or worsen    Dental crowns present     Elevated PSA     Hematuria, microscopic     History of total left knee replacement     twice one 2011/ and revision 2017    Hyperlipidemia     Hypertension     Irregular heart beat     not on EKGs anymore    Neuropathy     in feet    Prediabetes     Prostate cancer ([address] [facility])     Prostatitis, acute 09/18/2017    Seasonal allergies     Tooth missing     Wears glasses        Past Surgical History:   Procedure Laterality Date    CARDIAC CATHETERIZATION      approx 15 yrs ago for irreg heart beat at [address] [facility],[address] [facility] N/A 2/27/2019    Procedure: CYSTOSCOPY;  Surgeon: Bharathi Schmitz MD;  [facility];  Service: Urology    HERNIA REPAIR Right     inguinal    INSTILLATION MYTOMYCIN N/A 4/25/2018    Procedure: INSTILLATION MITOMYCIN;  Surgeon: Bharathi Schmitz MD;  [facility];  Service: Urology    JOINT REPLACEMENT Left     knee    KNEE ARTHROSCOPY Left 1991    ACL    [address] ANTICARCINOGENIC AGENT N/A 11/29/2017    Procedure: Molly Beard;  Surgeon: Bharathi Schmitz MD;  [facility];  Service: Urology    [place] BLADDER INSTILLATION ANTICARCINOGENIC AGENT N/A 2/27/2019    Procedure: Molly Beard;  Surgeon: Bharathi Schmitz MD;  [facility];  Service: Urology     [address] IMG GID N/A 9/25/2023    Procedure: TRANSPERINEAL MRI FUSION BIOPSY PROSTATE;  Surgeon: Georgie Justin MD;  [facility];  Service: Urology    TN CYSTO BLADDER [place] CATHETERIZATION N/A 4/25/2018 Procedure: CYSTOSCOPY WITH RETROGRADE PYELOGRAM;  Surgeon: Bharathi Schmitz MD;  [facility];  Service: Urology    [place] CYSTO W/REMOVAL OF LESIONS SMALL N/A 2/27/2019    Procedure: TRANSURETHRAL RESECTION OF BLADDER TUMOR (TURBT); Surgeon: Bharathi Schmitz MD;  [facility];  Service: Urology    VT CYSTOURETHROSCOPY N/A 11/29/2017    Procedure: CYSTOSCOPY;  Surgeon: Bharathi Schmitz MD;  [facility];  Service: Urology    VT CYSTOURETHROSCOPY W/DEST &/RMVL MED BLADDER [place] N/A 11/29/2017    Procedure: TRANSURETHRAL RESECTION OF BLADDER TUMOR (TURBT); Surgeon: Bharathi Schmitz MD;  [facility];  Service: Urology    SKIN BIOPSY      TONSILLECTOMY      TRANSURETHRAL RESECTION OF BLADDER TUMOR N/A 4/25/2018    Procedure: TRANSURETHRAL RESECTION OF BLADDER TUMOR (TURBT); Surgeon: Bharathi Schmitz MD;  [facility];  Service: Urology       Family History   Problem Relation Age of Onset    Heart failure Mother     Stomach cancer Father 79    Heart attack Father     Hypertension Father     Cancer Sister 76        blood cancer     I have reviewed and agree with the history as documented. E-Cigarette/Vaping    E-Cigarette Use Never User      E-Cigarette/Vaping Substances    Nicotine No     THC No     CBD No     Flavoring No     Other No     Unknown No      Social History     Tobacco Use    Smoking status: Never    Smokeless tobacco: Never   Vaping Use    Vaping Use: Never used   Substance Use Topics    Alcohol use: Not Currently     Comment: very infrequent social drinker    Drug use: Never       Review of Systems    Physical Exam  Physical Exam  Vitals and nursing note reviewed. Constitutional:       Appearance: He is well-developed. HENT:      Head: Normocephalic and atraumatic.       Right Ear: External ear normal.      Left Ear: External ear normal.      Nose: Nose normal.      Mouth/Throat:      Mouth: Mucous membranes are moist.   Eyes:      Conjunctiva/sclera: Conjunctivae normal. Pupils: Pupils are equal, round, and reactive to light. Cardiovascular:      Rate and Rhythm: Normal rate. Pulmonary:      Effort: Pulmonary effort is normal.   Abdominal:      Tenderness: There is no abdominal tenderness. Genitourinary:     Penis: Normal.       Testes:         Right: Tenderness and swelling present. Epididymis:      Right: Enlarged. Tenderness present. Musculoskeletal:         General: Normal range of motion. Cervical back: Normal range of motion and neck supple. Skin:     General: Skin is warm and dry. Capillary Refill: Capillary refill takes less than 2 seconds. Neurological:      Mental Status: He is alert and oriented to person, place, and time. Cranial Nerves: No cranial nerve deficit. Coordination: Coordination normal.   Psychiatric:         Behavior: Behavior normal.         Thought Content: Thought content normal.         Judgment: Judgment normal.         Vital Signs  ED Triage Vitals [11/26/23 1448]   Temperature Pulse Respirations Blood Pressure SpO2   97.8 °F (36.6 °C) 72 18 132/62 99 %      Temp Source Heart Rate Source Patient Position - Orthostatic VS BP Location FiO2 (%)   Oral Monitor Sitting Right arm --      Pain Score       2           Vitals:    11/26/23 1448 11/26/23 1716   BP: 132/62 119/72   Pulse: 72 62   Patient Position - Orthostatic VS: Sitting Lying         Visual Acuity      ED Medications  Medications   ketorolac (TORADOL) injection 15 mg (15 mg Intravenous Not Given 11/26/23 1550)       Diagnostic Studies  Results Reviewed       Procedure Component Value Units Date/Time    Urine Microscopic [[id number]]  (Abnormal) Collected: 11/26/23 1624    Lab Status: Final result Specimen: Urine, Clean Catch Updated: 11/26/23 1640     RBC, UA 4-10 /hpf      WBC, UA 10-20 /hpf      Epithelial Cells None Seen /hpf      Bacteria, UA None Seen /hpf     Urine culture [[id number]] Collected: 11/26/23 1624    Lab Status:  In process Specimen: Urine, Clean Catch Updated: 11/26/23 1640    Urine Macroscopic, POC [[id number]]  (Abnormal) Collected: 11/26/23 1624    Lab Status: Final result Specimen: Urine Updated: 11/26/23 1625     Color, UA Yellow     Clarity, UA Clear     pH, UA 6.0     Leukocytes, UA Negative     Nitrite, UA Negative     Protein,  (2+) mg/dl      Glucose, UA Negative mg/dl      Ketones, UA Negative mg/dl      Urobilinogen, UA 0.2 E.U./dl      Bilirubin, UA Negative     Occult Blood, UA Small     Specific Gravity, UA >=1.030    Narrative:      CLINITEK RESULT    Basic metabolic panel [[id number]]  (Abnormal) Collected: 11/26/23 1547    Lab Status: Final result Specimen: Blood from Arm, Right Updated: 11/26/23 1608     Sodium 135 mmol/L      Potassium 4.1 mmol/L      Chloride 103 mmol/L      CO2 24 mmol/L      ANION GAP 8 mmol/L      BUN 23 mg/dL      Creatinine 1.32 mg/dL      Glucose 109 mg/dL      Calcium 9.0 mg/dL      eGFR 53 ml/min/1.73sq m     Narrative:      [place] guidelines for Chronic Kidney Disease (CKD):     Stage 1 with normal or high GFR (GFR > 90 mL/min/1.73 square meters)    Stage 2 Mild CKD (GFR = 60-89 mL/min/1.73 square meters)    Stage 3A Moderate CKD (GFR = 45-59 mL/min/1.73 square meters)    Stage 3B Moderate CKD (GFR = 30-44 mL/min/1.73 square meters)    Stage 4 Severe CKD (GFR = 15-29 mL/min/1.73 square meters)    Stage 5 End Stage CKD (GFR <15 mL/min/1.73 square meters)  Note: GFR calculation is accurate only with a steady state creatinine    CBC and differential [[id number]]  (Abnormal) Collected: 11/26/23 1547    Lab Status: Final result Specimen: Blood from Arm, Right Updated: 11/26/23 1603     WBC 8.06 Thousand/uL      RBC 4.22 Million/uL      Hemoglobin 12.5 g/dL      Hematocrit 37.5 %      MCV 89 fL      MCH 29.6 pg      MCHC 33.3 g/dL      RDW 12.3 %      MPV 9.3 fL      Platelets 483 Thousands/uL      nRBC 0 /100 WBCs      Neutrophils Relative 74 %      Immat GRANS % 0 % Lymphocytes Relative 10 %      Monocytes Relative 16 %      Eosinophils Relative 0 %      Basophils Relative 0 %      Neutrophils Absolute 5.89 Thousands/µL      Immature Grans Absolute 0.03 Thousand/uL      Lymphocytes Absolute 0.83 Thousands/µL      Monocytes Absolute 1.27 Thousand/µL      Eosinophils Absolute 0.02 Thousand/µL      Basophils Absolute 0.02 Thousands/µL     Narrative: This is an appended report. These results have been appended to a previously verified report. US scrotum and testicles   Final Result by Argenis Sorensen MD (11/26 1719)      Right epididymoorchitis with small right hydrocele. The study was marked in [facility] for immediate notification. Workstation performed: [id number]                    Procedures  Procedures         ED Course  ED Course as of 11/26/23 1847   Johnson Casey Nov 26, 2023   1720 US scrotum and testicles  Imaging reviewed and interpreted myself and concur with radiologist:   "Right epididymoorchitis with small right hydrocele."   1802 D/W Ovi Ashley covering urology, agreed to continue treatment, patient has followup with urology already to set up radiation markers, which requires UA to be sterile, so they will followup with him   1803 Reviewed results with patient at bedside and updated on the plan                                SBIRT 22yo+      Flowsheet Row Most Recent Value   Initial Alcohol Screen: US AUDIT-C     1. How often do you have a drink containing alcohol? 0 Filed at: 11/26/2023 1514   2. How many drinks containing alcohol do you have on a typical day you are drinking? 0 Filed at: 11/26/2023 1514   3b. FEMALE Any Age, or MALE 65+: How often do you have 4 or more drinks on one occassion? 0 Filed at: 11/26/2023 1514   Audit-C Score 0 Filed at: 11/26/2023 1514   ABDELRAHMAN: How many times in the past year have you. .. Used an illegal drug or used a prescription medication for non-medical reasons?  Never Filed at: 11/26/2023 1514 Medical Decision Making  Amount and/or Complexity of Data Reviewed  Labs: ordered. Radiology: ordered. Decision-making details documented in ED Course. Risk  Prescription drug management. Disposition  Final diagnoses:   Epididymoorchitis - right     Time reflects when diagnosis was documented in both MDM as applicable and the Disposition within this note       Time User Action Codes Description Comment    11/26/2023  5:34 PM Hien Quijano Add [N45.3] Epididymoorchitis     11/26/2023  5:34 PM Hien Quijano Modify [N45.3] Epididymoorchitis right          ED Disposition       ED Disposition   Discharge    Condition   Good    Date/Time   Sun Nov 26, 2023 1362 [address] [place] discharge to home/self care.                    Follow-up Information       Follow up With Specialties Details Why Contact Info Additional [address] [facility] Urology [facility] Urology Schedule an appointment as soon as possible for a visit  For followup [address] [address] [address] [facility], [address] [place] [address], [place], [place], [address] [phone]            Discharge Medication List as of 11/26/2023  5:52 PM        CONTINUE these medications which have NOT CHANGED    Details   Alpha-Lipoic Acid 600 MG CAPS Take 600 mg by mouth daily  , Historical Med      amoxicillin (AMOXIL) 500 MG tablet Take 500 mg by mouth daily as needed (one hour prior to dental appt), Historical Med      bisoprolol-hydrochlorothiazide (ZIAC) 2.5-6.25 MG per tablet Take 1 tablet by mouth daily, Historical Med      Coenzyme Q10-Red Yeast Rice  MG CAPS Take by mouth 50/1200 dose, Historical Med      Cranberry 500 MG CAPS Take 1 capsule by mouth in the morning, Historical Med      Multiple Vitamins-Minerals (PreserVision AREDS 2) CAPS Take by mouth in the morning Vision supplement, Historical Med Omega-3 Fatty Acids (FISH OIL OMEGA-3 PO) Take 1 tablet by mouth in the morning, Historical Med      Probiotic Product (PROBIOTIC PO) Take 1 capsule by mouth daily, Historical Med      Saw [place] 160 MG CAPS Take 320 mg by mouth in the morning, Historical Med      sildenafil (VIAGRA) 50 MG tablet 50 mg daily as needed, Starting Thu 12/20/2018, Historical Med      tamsulosin (FLOMAX) 0.4 mg Take 0.4 mg by mouth daily with dinner  , Historical Med             No discharge procedures on file.     PDMP Review       None            ED Provider  Electronically Signed by             Toribio Robbins MD  11/26/23 3714

## 2023-11-27 LAB — BACTERIA UR CULT: NORMAL

## 2023-11-27 NOTE — H&P
H&P Exam - Urology   Jihan Lebron 68 y.o. male MRN: [id number]  Unit/Bed#:  Encounter: [id number]    Assessment/Plan     Assessment:  Adenocarcinoma the prostate [place] pattern score 6 and 7-planning radiation therapy  Plan:  Transrectal ultrasound-guided transperineal percutaneous placement of gold prostatic fiducial markers and hydrogel SpaceOAR. History of Present Illness   HPI:  Jihan Lebron is a 68 y.o. male who presents with adenocarcinoma of the prostate [place] pattern score 7 and 6. The patient also has a history of noninvasive low-grade papillary urothelial carcinoma having undergone TURBT in 2018 with 6-week course of intravesical BCG therapy. He had a recurrence in 2019 with another 6 weeks of BCG. Annual cystoscopy since has been negative for malignancy. I met the patient in the holding area discussed the procedure as proposed step-by-step performed history and physical answered all patient questions discussed potential risks and complications of bleeding infection need to abort hydrogel or spacer rectal injury urinary retention potential need for [place] catheter or operative interventions. The patient provided verbal and signed informed consent. .    Review of Systems    Historical Information   Past Medical History:   Diagnosis Date    Arthritis     Bladder cancer ([address]) 11/29/17    superficial    Bladder tumor     BPH with obstruction/lower urinary tract symptoms     Cancer [facility])     bladder    Cold     11/15 symptoms/to call Dr Jayda Chowdhury if they persist or worsen    Dental crowns present     Elevated PSA     Hematuria, microscopic     History of total left knee replacement     twice one 2011/ and revision 2017    Hyperlipidemia     Hypertension     Irregular heart beat     not on EKGs anymore    Neuropathy     in feet    Prediabetes     Prostate cancer ([address])     Prostatitis, acute 09/18/2017    Seasonal allergies     Tooth missing     Wears glasses      Past Surgical History:   Procedure Laterality Date    CARDIAC CATHETERIZATION      approx 15 yrs ago for irreg heart beat at [address] [facility] [place] N/A 2/27/2019    Procedure: CYSTOSCOPY;  Surgeon: Angella Gonzalez MD;  [facility];  Service: Urology    HERNIA REPAIR Right     inguinal    INSTILLATION MYTOMYCIN N/A 4/25/2018    Procedure: INSTILLATION MITOMYCIN;  Surgeon: Angella Gonzalez MD;  [facility];  Service: Urology    JOINT REPLACEMENT Left     knee    KNEE ARTHROSCOPY Left 1991    ACL    LIPOMA RESECTION      [place] BLADDER INSTILLATION ANTICARCINOGENIC AGENT N/A 11/29/2017    Procedure: Deann Es;  Surgeon: Angella Gonzalez MD;  [facility];  Service: Urology    [place] BLADDER INSTILLATION ANTICARCINOGENIC AGENT N/A 2/27/2019    Procedure: Deann Es;  Surgeon: Angella Gonzalez MD;  [facility];  Service: Urology     [address] IMG GID N/A 9/25/2023    Procedure: TRANSPERINEAL MRI FUSION BIOPSY PROSTATE;  Surgeon: Shellie Guerin MD;  [facility];  Service: Urology    GA CYSTO BLADDER Nia [place] CATHETERIZATION N/A 4/25/2018    Procedure: CYSTOSCOPY WITH RETROGRADE PYELOGRAM;  Surgeon: Angella Gonzalez MD;  [facility];  Service: Urology    GA CYSTO W/REMOVAL OF LESIONS SMALL N/A 2/27/2019    Procedure: TRANSURETHRAL RESECTION OF BLADDER TUMOR (TURBT); Surgeon: Angella Gonzalez MD;  [facility];  Service: Urology    GA CYSTOURETHROSCOPY N/A 11/29/2017    Procedure: CYSTOSCOPY;  Surgeon: Angella Gonzalez MD;  [facility];  Service: Urology    [place] CYSTOURETHROSCOPY W/DEST &/RMVL MED BLADDER [place] N/A 11/29/2017    Procedure: TRANSURETHRAL RESECTION OF BLADDER TUMOR (TURBT); Surgeon: Angella Gonzalez MD;  [facility];  Service: Urology    SKIN BIOPSY      TONSILLECTOMY      TRANSURETHRAL RESECTION OF BLADDER TUMOR N/A 4/25/2018    Procedure: TRANSURETHRAL RESECTION OF BLADDER TUMOR (TURBT);   Surgeon: Angella Gonzalez MD;  Location: [facility] OR;  Service: Urology     Social History   Social History     Substance and Sexual Activity   Alcohol Use Not Currently    Comment: very infrequent social drinker     Social History     Substance and Sexual Activity   Drug Use Never     Social History     Tobacco Use   Smoking Status Never   Smokeless Tobacco Never     Family History:   Family History   Problem Relation Age of Onset    Heart failure Mother     Stomach cancer Father 79    Heart attack Father     Hypertension Father     Cancer Sister 76        blood cancer       Meds/Allergies   all medications and allergies reviewed  No Known Allergies    Objective   Vitals: There were no vitals taken for this visit. No intake/output data recorded. Invasive Devices       None                   Physical Exam  Vitals reviewed. Constitutional:       General: He is not in acute distress. Appearance: Normal appearance. He is not ill-appearing, toxic-appearing or diaphoretic. HENT:      Head: Normocephalic and atraumatic. Nose: Nose normal.      Mouth/Throat:      Mouth: Mucous membranes are moist.   Eyes:      Extraocular Movements: Extraocular movements intact. Cardiovascular:      Rate and Rhythm: Normal rate and regular rhythm. Pulmonary:      Effort: Pulmonary effort is normal. No respiratory distress. Breath sounds: No wheezing, rhonchi or rales. Abdominal:      General: There is no distension. Palpations: Abdomen is soft. Tenderness: There is no abdominal tenderness. There is no guarding or rebound. Musculoskeletal:      Cervical back: Neck supple. Skin:     General: Skin is dry. Neurological:      Mental Status: He is alert and oriented to person, place, and time. Psychiatric:         Mood and Affect: Mood normal.         Behavior: Behavior normal.         Thought Content:  Thought content normal.         Judgment: Judgment normal.         Lab Results: I have personally reviewed pertinent reports. Imaging: I have personally reviewed pertinent reports. and I have personally reviewed pertinent films in PACS  EKG, Pathology, and Other Studies: I have personally reviewed pertinent reports. and I have personally reviewed pertinent films in PACS  VTE Prophylaxis: Sequential compression device Lisa Bustillo)     Code Status: No Order  Advance Directive and Living Will:      Power of :    POLST:      Counseling / Coordination of Care  Total floor / unit time spent today 30 minutes. Greater than 50% of total time was spent with the patient and / or family counseling and / or coordination of care. A description of the counseling / coordination of care: Nyasia Sanders

## 2023-11-27 NOTE — DISCHARGE INSTRUCTIONS
Gross oral fluid intake and limited activity for 24 to 48 hours.   Report any difficulty urinating excessive bleeding or fevers

## 2023-11-29 ENCOUNTER — NURSE TRIAGE (OUTPATIENT)
Age: 73
End: 2023-11-29

## 2023-11-29 ENCOUNTER — TELEPHONE (OUTPATIENT)
Dept: GENETICS | Facility: CLINIC | Age: 73
End: 2023-11-29

## 2023-11-29 ENCOUNTER — PATIENT OUTREACH (OUTPATIENT)
Dept: HEMATOLOGY ONCOLOGY | Facility: CLINIC | Age: 73
End: 2023-11-29

## 2023-11-29 DIAGNOSIS — C61 PROSTATE CANCER (HCC): Primary | ICD-10-CM

## 2023-11-29 NOTE — TELEPHONE ENCOUNTER
Pt of Dr. Omar Cornejo in [facility]. Last seen 10/09/23. H/O prostate cancer. Pt dx with prostate cancer. Last Monday developed UTI contacted his PCP and was prescribed antibiotic, started to feel a little better. Thursday started to feel off again, having tenderness around testicle On Sunday went to ER dx with right epididymoorchitis and advised to continue with antibiotics. Reports UTI seems like it is gone but still having some swelling and tenderness in his testicle Today is the last day of the medication and patient is wondering if he should start another course of the medication due to upcoming procedure and still having some swelling.  Please advise     Sulfamethoxazole Tmpds tablet taking twice a day  Reason for Disposition  • Scrotum swelling and no pain    Protocols used: Scrotum Swelling-ADULT-[place]

## 2023-11-29 NOTE — PROGRESS NOTES
Patient called and LM stating that he is interested in genetic testing at this time. I returned patients call and explained that I will be placing the referral and one of the genetic counselors will reach out to him. He mentioned that [place] days and will be back on 12/6/23. I let him know that I will make a note to the counselors to outreach then. He voiced understanding.

## 2023-11-29 NOTE — TELEPHONE ENCOUNTER
I called Berto Avendano to schedule a new patient appointment with the Cancer Risk and Genetics Program.      Outcome:  Genetics appointment scheduled for 11/30 at 12:00 via televisit. Personal/Family History Related to Appointment:  Prostate Ca, hx of bladder ca 2017. Fhx of stomach ca(father), blood cancer (sister).  Non-Voodoo    History of Genetic Testing:  Patient reports no personal or family history of genetic testing    Genetics Family History Questionnaire:  I confirmed the patient's e-mail on file as the best e-mail to send an invite link for our genetics family history intake

## 2023-11-30 ENCOUNTER — CLINICAL SUPPORT (OUTPATIENT)
Dept: GENETICS | Facility: CLINIC | Age: 73
End: 2023-11-30

## 2023-11-30 ENCOUNTER — DOCUMENTATION (OUTPATIENT)
Dept: GENETICS | Facility: CLINIC | Age: 73
End: 2023-11-30

## 2023-11-30 DIAGNOSIS — C67.4 MALIGNANT NEOPLASM OF POSTERIOR WALL OF URINARY BLADDER (HCC): Primary | ICD-10-CM

## 2023-11-30 DIAGNOSIS — C61 PROSTATE CANCER (HCC): ICD-10-CM

## 2023-11-30 NOTE — LETTER
2023     Maynor Hinds MD  [address] [address]    Patient: Claudetta Bunk  YOB: 1950  Date of Visit: 2023      Dear Dr. Maryjane Cohn: Thank you for referring Nory Denny to me for evaluation. Below are my notes for this consultation. If you have questions, please do not hesitate to call me. I look forward to following your patient along with you. Sincerely,        Margy Anderson        CC: No Recipients        Pre-Test Genetic Counseling Consult Note    Patient Name: Claudetta Bunk   /Age: 1950/73 y.o. Referring Provider: Maynor Hinds MD    Date of Service: 2023  Genetic Counselor: Margy Anderson MS, [facility]  Interpretation Services: None  Location: Telephone consult   Length of Visit: 27 Minutes    Bryanna Bernal was referred to the [address] [place], [place] [address] Floor and Genetic Assessment Program due to his personal history of bladder cancer and prostate cancer and family history of stomach cancer . he presents today to discuss the possibility of a hereditary cancer syndrome, options for genetic testing, and implications for him and his family.      Cancer History and Treatment:   Personal History:   Prostate cancer diagnosed at 68; PSA: 4.69 ng/mL   Plan: likely RT     Low-grade papillary urothelial carcinoma diagnosed at ~77 y/o, s/p TURBT;  Recurrence at 77, s/p TURBT    Screening Hx:   Colon:  Colonoscopy at [facility]  (): 2 polyps per patient report   F/u recommended in 5 years     Colonoscopy (): 4 polyps reported    <10 cumulative colon polyps     Skin:  Sees derm annually    Other screening: none    Medical and Surgical History  Pertinent surgical history:   Past Surgical History:   Procedure Laterality Date   • CARDIAC CATHETERIZATION      approx 15 yrs ago for irreg heart beat at [facility]   • COLONOSCOPY     • CYSTOSCOPY     • CYSTOSCOPY N/A 2019    Procedure: CYSTOSCOPY;  Surgeon: Maynor Hinds MD;  Location: [facility] OR; Service: Urology   • HERNIA REPAIR Right     inguinal   • INSTILLATION MYTOMYCIN N/A 4/25/2018    Procedure: INSTILLATION MITOMYCIN;  Surgeon: Angella Gonzalez MD;  [facility];  Service: Urology   • JOINT REPLACEMENT Left     knee   • KNEE ARTHROSCOPY Left 1991    ACL   • LIPOMA RESECTION     • MS BLADDER INSTILLATION ANTICARCINOGENIC AGENT N/A 11/29/2017    Procedure: Deann Suggs;  Surgeon: Angella Gonzalez MD;  [facility];  Service: Urology   • MS BLADDER INSTILLATION ANTICARCINOGENIC AGENT N/A 2/27/2019    Procedure: Deann Es;  Surgeon: Angella Gonzalez MD;  [facility];  Service: Urology   • MS BX PROSTATE STRTCTC SATURATION SAMPLING IMG GID N/A 9/25/2023    Procedure: TRANSPERINEAL MRI FUSION BIOPSY PROSTATE;  Surgeon: Shellie Guerin MD;  [facility];  Service: Urology   • MS CYSTO BLADDER W/URETERAL CATHETERIZATION N/A 4/25/2018    Procedure: CYSTOSCOPY WITH RETROGRADE PYELOGRAM;  Surgeon: Angella Gonzalez MD;  [facility];  Service: Urology   • MS CYSTO W/REMOVAL OF LESIONS SMALL N/A 2/27/2019    Procedure: TRANSURETHRAL RESECTION OF BLADDER TUMOR (TURBT); Surgeon: Angella Gonzalez MD;  [facility];  Service: Urology   • MS CYSTOURETHROSCOPY N/A 11/29/2017    Procedure: CYSTOSCOPY;  Surgeon: Angella Gonzalez MD;  [facility];  Service: Urology   • MS CYSTOURETHROSCOPY W/DEST &/RMVL MED BLADDER EDWARD N/A 11/29/2017    Procedure: TRANSURETHRAL RESECTION OF BLADDER TUMOR (TURBT); Surgeon: Angella Gonzalez MD;  [facility];  Service: Urology   • SKIN BIOPSY     • TONSILLECTOMY     • TRANSURETHRAL RESECTION OF BLADDER TUMOR N/A 4/25/2018    Procedure: TRANSURETHRAL RESECTION OF BLADDER TUMOR (TURBT);   Surgeon: Angella Gonzalez MD;  [facility];  Service: Urology      Pertinent medical history:  Past Medical History:   Diagnosis Date   • Arthritis    • Bladder cancer ([address]) 11/29/17    superficial   • Bladder tumor    • BPH with obstruction/lower urinary tract symptoms    • Cancer [facility])     bladder   • Cold     11/15 symptoms/to call Dr Villasenor Cough if they persist or worsen   • Dental crowns present    • Elevated PSA    • Hematuria, microscopic    • History of total left knee replacement     twice one 2011/ and revision 2017   • Hyperlipidemia    • Hypertension    • Irregular heart beat     not on EKGs anymore   • Neuropathy     in feet   • Prediabetes    • Prostate cancer ([address])    • Prostatitis, acute 09/18/2017   • Seasonal allergies    • Tooth missing    • Wears glasses          Other History:  Height:   Ht Readings from Last 1 Encounters:   10/11/23 5' 10" (1.778 m)     Weight:   Wt Readings from Last 1 Encounters:   11/26/23 77.2 kg (170 lb 3.1 oz)       Relevant Family History   Patient reports non-Ashkenazi Hinduism ancestry. Ed's maternal and paternal relatives were relocated from [facility] to [place] labor camps during the WWII era. His mother lost contact with many relatives as a result. There is limited information regarding family history and structure on his maternal and paternal side. Maternal Family History:  Non-contributory  Limited information regarding family history and structure     Paternal Family History:  Father: stomach cancer diagnosed at 79 (d.70)     Please refer to the scanned pedigree in the Media Tab for a complete family history     *All history is reported as provided by the patient; records are not available for review, except where indicated. Assessment:  We discussed sporadic, familial and hereditary cancer. We reviewed that only 5-10% of cancers are considered hereditary. Cancers such as lung cancer, cervical cancer, testicular cancer, and liver cancer very rarely have a hereditary etiology, and we cannot test for a genetic predisposition for these cancers at this time.   We also discussed the many factors that influence our risk for cancer such as age, environmental exposures, lifestyle choices and family history. Lynette Yun mentioned he was raised next to [employer] and the most polluted [place] in the [facility]. We reviewed the indications suggestive of a hereditary predisposition to cancer. Although the personal and family history of cancer is not consistent with the typical presentation of a hereditary cancer syndrome, our assessment is hindered given the limited information regarding his maternal and paternal relatives. Genetic testing may be considered to rule out pathogenic variants in cancer susceptibility genes that have clear management recommendations. The risks, benefits, and limitations of genetic testing were reviewed with the patient, as well as genetic discrimination laws, and possible test results (positive, negative, variants of uncertain significance) and their clinical implications. If positive for a mutation, options for managing cancer risk including increased surveillance, chemoprevention, and in some cases prophylactic surgery were discussed. Lynette Yun was informed that if a hereditary cancer syndrome was identified in him, first degree relatives (parents, siblings, and children) have a chance of also inheriting the condition. Genetic testing would allow for predictive genetic testing in other relatives, who may also be at risk depending on their degree of relation. Plan: Patient decided to proceed with testing and provided consent. Summary:     Sample Collection: Lynette Yun will be in [place] from 12/2/23-12/5/23. A blood kit was shipped via overnight FedEx to the patient's address on file. He intends to have the blood drawn for genetics on 12/6/23 along with other labs.      Genetic Testing Preformed: [facility] Multi-Cancer Panel + RNA  (79 Genes): AIP, ALK, APC, RODNEY, AXIN2, BAP1, BARD1, BLM, BMPR1A, BRCA1, BRCA2, BRIP1, CDC73, CDH1, CDK4, CDKN1B, CDKN2A, CHEK2, CTNNA1, DICER1, EGFR, EPCAM, FH, FLCN, GREM1, HOXB13, KIT, LZTR1, MAX, MBD4, MEN1, MET, MITF, MLH1, MSH2, MSH3, MSH6, MUTYH, NF1, NF2, NTHL1, PALB2, PDGFRA, PMS2, POLD1, POLE, POT1, TDIFX5P, PTCH1, PTEN, RAD51C, RAD51D, RB1, RET, SDHA, SDHAF2, SDHB, SDHC, SDHD, SMAD4, SMARCA4, SMARCB1, SMARCE1, STK11, SUFU, [id number], TP53, TSC1, TSC2, VHL     Result Call Information:  In the event that we need to reach Ranjan Allen via telephone:  I confirmed the patient's home number on file as the best number to call with results  I confirmed with the patient that we can leave a voicemail on home number    Results take approximately 2-3 weeks to complete once test is started. Ranjan Allen will be notified via Mirego once results are available. Additional recommendations for surveillance/medical management will be made pending genetic test results.

## 2023-11-30 NOTE — PROGRESS NOTES
Pre-Test Genetic Counseling Consult Note    Patient Name: Cristi Kessler   /Age: 1950/73 y.o. Referring Provider: Perico Kapadia MD    Date of Service: 2023  Genetic Counselor: Liban Brown MS, [facility]  Interpretation Services: None  Location: Telephone consult   Length of Visit: 27 Minutes    Vanesa Silvestre was referred to the [address] [place], [place] [address] Floor and Genetic Assessment Program due to his personal history of bladder cancer and prostate cancer and family history of stomach cancer . he presents today to discuss the possibility of a hereditary cancer syndrome, options for genetic testing, and implications for him and his family.      Cancer History and Treatment:   Personal History:   Prostate cancer diagnosed at 68; PSA: 4.69 ng/mL   Plan: likely RT     Low-grade papillary urothelial carcinoma diagnosed at ~77 y/o, s/p TURBT;  Recurrence at 77, s/p TURBT    Screening Hx:   Colon:  Colonoscopy at [facility]  (): 2 polyps per patient report   F/u recommended in 5 years     Colonoscopy (): 4 polyps reported    <10 cumulative colon polyps     Skin:  Sees derm annually    Other screening: none    Medical and Surgical History  Pertinent surgical history:   Past Surgical History:   Procedure Laterality Date    CARDIAC CATHETERIZATION      approx 15 yrs ago for irreg heart beat at [address] [facility] [facility] N/A 2019    Procedure: CYSTOSCOPY;  Surgeon: Perico Kapadia MD;  [facility];  Service: Urology    HERNIA REPAIR Right     inguinal    INSTILLATION MYTOMYCIN N/A 2018    Procedure: INSTILLATION MITOMYCIN;  Surgeon: Perico Kapadia MD;  [facility];  Service: Urology    JOINT REPLACEMENT Left     knee    KNEE ARTHROSCOPY Left     ACL    [address] ANTICARCINOGENIC AGENT N/A 2017    Procedure: Colby Revegilson;  Surgeon: Perico Kapadia MD;  [facility];  Service: Urology    HI BLADDER INSTILLATION ANTICARCINOGENIC AGENT N/A 2/27/2019    Procedure: INSTILLATION MITOMYCIN;  Surgeon: Jammie Herr MD;  [facility];  Service: Urology     [address] IMG GID N/A 9/25/2023    Procedure: TRANSPERINEAL MRI FUSION BIOPSY PROSTATE;  Surgeon: Will Deng MD;  [facility];  Service: Urology    DE CYSTO BLADDER [employer] CATHETERIZATION N/A 4/25/2018    Procedure: CYSTOSCOPY WITH RETROGRADE PYELOGRAM;  Surgeon: Jammie Herr MD;  [facility];  Service: Urology    DE CYSTO W/REMOVAL OF LESIONS SMALL N/A 2/27/2019    Procedure: TRANSURETHRAL RESECTION OF BLADDER TUMOR (TURBT); Surgeon: Jammie Herr MD;  [facility];  Service: Urology    DE CYSTOURETHROSCOPY N/A 11/29/2017    Procedure: CYSTOSCOPY;  Surgeon: Jammie Herr MD;  [facility];  Service: Urology    DE CYSTOURETHROSCOPY W/DEST &/RMVL MED BLADDER EDWARD N/A 11/29/2017    Procedure: TRANSURETHRAL RESECTION OF BLADDER TUMOR (TURBT); Surgeon: Jammie Herr MD;  [facility];  Service: Urology    SKIN BIOPSY      TONSILLECTOMY      TRANSURETHRAL RESECTION OF BLADDER TUMOR N/A 4/25/2018    Procedure: TRANSURETHRAL RESECTION OF BLADDER TUMOR (TURBT);   Surgeon: Jammie Herr MD;  [facility];  Service: Urology      Pertinent medical history:  Past Medical History:   Diagnosis Date    Arthritis     Bladder cancer ([address] [facility]) 11/29/17    superficial    Bladder tumor     BPH with obstruction/lower urinary tract symptoms     Cancer [facility])     bladder    Cold     11/15 symptoms/to call Dr Shree Perea if they persist or worsen    Dental crowns present     Elevated PSA     Hematuria, microscopic     History of total left knee replacement     twice one 2011/ and revision 2017    Hyperlipidemia     Hypertension     Irregular heart beat     not on EKGs anymore    Neuropathy     in feet    Prediabetes     Prostate cancer ([address] [facility])     Prostatitis, acute 09/18/2017    Seasonal allergies     Tooth missing Wears glasses          Other History:  Height:   Ht Readings from Last 1 Encounters:   10/11/23 5' 10" (1.778 m)     Weight:   Wt Readings from Last 1 Encounters:   11/26/23 77.2 kg (170 lb 3.1 oz)       Relevant Family History   Patient reports non-Ashkenazi Lutheran ancestry. Ed's maternal and paternal relatives were relocated from [facility] to [place] labor camps during the WWII era. His mother lost contact with many relatives as a result. There is limited information regarding family history and structure on his maternal and paternal side. Maternal Family History:  Non-contributory  Limited information regarding family history and structure     Paternal Family History:  Father: stomach cancer diagnosed at 79 (d.70)     Please refer to the scanned pedigree in the Media Tab for a complete family history     *All history is reported as provided by the patient; records are not available for review, except where indicated. Assessment:  We discussed sporadic, familial and hereditary cancer. We reviewed that only 5-10% of cancers are considered hereditary. Cancers such as lung cancer, cervical cancer, testicular cancer, and liver cancer very rarely have a hereditary etiology, and we cannot test for a genetic predisposition for these cancers at this time. We also discussed the many factors that influence our risk for cancer such as age, environmental exposures, lifestyle choices and family history. Dale Juarez mentioned he was raised next to [employer] and the most polluted [place] in the [facility]. We reviewed the indications suggestive of a hereditary predisposition to cancer. Although the personal and family history of cancer is not consistent with the typical presentation of a hereditary cancer syndrome, our assessment is hindered given the limited information regarding his maternal and paternal relatives.  Genetic testing may be considered to rule out pathogenic variants in cancer susceptibility genes that have clear management recommendations. The risks, benefits, and limitations of genetic testing were reviewed with the patient, as well as genetic discrimination laws, and possible test results (positive, negative, variants of uncertain significance) and their clinical implications. If positive for a mutation, options for managing cancer risk including increased surveillance, chemoprevention, and in some cases prophylactic surgery were discussed. Taisha Castillo was informed that if a hereditary cancer syndrome was identified in him, first degree relatives (parents, siblings, and children) have a chance of also inheriting the condition. Genetic testing would allow for predictive genetic testing in other relatives, who may also be at risk depending on their degree of relation. Plan: Patient decided to proceed with testing and provided consent. Summary:     Sample Collection: Taisha Castillo will be in [place] from 12/2/23-12/5/23. A blood kit was shipped via overnight FedEx to the patient's address on file. He intends to have the blood drawn for genetics on 12/6/23 along with other labs.      Genetic Testing Preformed: [facility] Multi-Cancer Panel + RNA  (79 Genes): AIP, ALK, APC, RODNEY, AXIN2, BAP1, BARD1, BLM, BMPR1A, BRCA1, BRCA2, BRIP1, CDC73, CDH1, CDK4, CDKN1B, CDKN2A, CHEK2, CTNNA1, DICER1, EGFR, EPCAM, FH, FLCN, GREM1, HOXB13, KIT, LZTR1, MAX, MBD4, MEN1, MET, MITF, MLH1, MSH2, MSH3, MSH6, MUTYH, NF1, NF2, NTHL1, PALB2, PDGFRA, PMS2, POLD1, POLE, POT1, YACQM8S, PTCH1, PTEN, RAD51C, RAD51D, RB1, RET, SDHA, SDHAF2, SDHB, SDHC, SDHD, SMAD4, SMARCA4, SMARCB1, SMARCE1, STK11, SUFU, [id number], TP53, TSC1, TSC2, VHL     Result Call Information:  In the event that we need to reach Taisha Castillo via telephone:  I confirmed the patient's home number on file as the best number to call with results  I confirmed with the patient that we can leave a voicemail on home number    Results take approximately 2-3 weeks to complete once test is started. [facility] will be notified via BULXt once results are available. Additional recommendations for surveillance/medical management will be made pending genetic test results.

## 2023-12-04 ENCOUNTER — ANESTHESIA EVENT (OUTPATIENT)
Dept: PERIOP | Facility: HOSPITAL | Age: 73
End: 2023-12-04
Payer: MEDICARE

## 2023-12-04 PROBLEM — N39.0 ACUTE UTI: Status: RESOLVED | Noted: 2017-11-20 | Resolved: 2023-12-04

## 2023-12-06 ENCOUNTER — NURSE TRIAGE (OUTPATIENT)
Age: 73
End: 2023-12-06

## 2023-12-06 ENCOUNTER — APPOINTMENT (OUTPATIENT)
Dept: LAB | Facility: HOSPITAL | Age: 73
End: 2023-12-06
Payer: MEDICARE

## 2023-12-06 DIAGNOSIS — Z80.0 FAMILY HISTORY OF MALIGNANT NEOPLASM OF GASTROINTESTINAL TRACT: ICD-10-CM

## 2023-12-06 DIAGNOSIS — C61 MALIGNANT NEOPLASM OF PROSTATE (HCC): ICD-10-CM

## 2023-12-06 DIAGNOSIS — C67.9 MALIGNANT NEOPLASM OF URINARY BLADDER, UNSPECIFIED SITE (HCC): ICD-10-CM

## 2023-12-06 DIAGNOSIS — C61 PROSTATE CANCER (HCC): ICD-10-CM

## 2023-12-06 DIAGNOSIS — Z01.812 PRE-OPERATIVE LABORATORY EXAMINATION: ICD-10-CM

## 2023-12-06 DIAGNOSIS — R39.89 SUSPECTED UTI: ICD-10-CM

## 2023-12-06 DIAGNOSIS — C67.1 MALIGNANT NEOPLASM OF DOME OF URINARY BLADDER (HCC): ICD-10-CM

## 2023-12-06 DIAGNOSIS — N50.89 TESTICULAR SWELLING: Primary | ICD-10-CM

## 2023-12-06 LAB
ALBUMIN SERPL BCP-MCNC: 3.9 G/DL (ref 3.5–5)
ALP SERPL-CCNC: 49 U/L (ref 34–104)
ALT SERPL W P-5'-P-CCNC: 19 U/L (ref 7–52)
ANION GAP SERPL CALCULATED.3IONS-SCNC: 7 MMOL/L
AST SERPL W P-5'-P-CCNC: 16 U/L (ref 13–39)
BASOPHILS # BLD MANUAL: 0 THOUSAND/UL (ref 0–0.1)
BASOPHILS NFR MAR MANUAL: 0 % (ref 0–1)
BILIRUB SERPL-MCNC: 1.05 MG/DL (ref 0.2–1)
BUN SERPL-MCNC: 17 MG/DL (ref 5–25)
CALCIUM SERPL-MCNC: 9 MG/DL (ref 8.4–10.2)
CHLORIDE SERPL-SCNC: 102 MMOL/L (ref 96–108)
CO2 SERPL-SCNC: 27 MMOL/L (ref 21–32)
CREAT SERPL-MCNC: 0.93 MG/DL (ref 0.6–1.3)
EOSINOPHIL # BLD MANUAL: 0.11 THOUSAND/UL (ref 0–0.4)
EOSINOPHIL NFR BLD MANUAL: 1 % (ref 0–6)
ERYTHROCYTE [DISTWIDTH] IN BLOOD BY AUTOMATED COUNT: 12.3 % (ref 11.6–15.1)
GFR SERPL CREATININE-BSD FRML MDRD: 81 ML/MIN/1.73SQ M
GLUCOSE P FAST SERPL-MCNC: 115 MG/DL (ref 65–99)
HCT VFR BLD AUTO: 36.7 % (ref 36.5–49.3)
HGB BLD-MCNC: 12.2 G/DL (ref 12–17)
LYMPHOCYTES # BLD AUTO: 1.25 THOUSAND/UL (ref 0.6–4.47)
LYMPHOCYTES # BLD AUTO: 11 % (ref 14–44)
MCH RBC QN AUTO: 29.9 PG (ref 26.8–34.3)
MCHC RBC AUTO-ENTMCNC: 33.2 G/DL (ref 31.4–37.4)
MCV RBC AUTO: 90 FL (ref 82–98)
MISCELLANEOUS LAB TEST RESULT: NORMAL
MONOCYTES # BLD AUTO: 1.14 THOUSAND/UL (ref 0–1.22)
MONOCYTES NFR BLD: 10 % (ref 4–12)
NEUTROPHILS # BLD MANUAL: 8.87 THOUSAND/UL (ref 1.85–7.62)
NEUTS SEG NFR BLD AUTO: 78 % (ref 43–75)
PLATELET # BLD AUTO: 302 THOUSANDS/UL (ref 149–390)
PLATELET BLD QL SMEAR: ADEQUATE
PMV BLD AUTO: 9.7 FL (ref 8.9–12.7)
POTASSIUM SERPL-SCNC: 3.8 MMOL/L (ref 3.5–5.3)
PROT SERPL-MCNC: 7 G/DL (ref 6.4–8.4)
RBC # BLD AUTO: 4.08 MILLION/UL (ref 3.88–5.62)
RBC MORPH BLD: NORMAL
SODIUM SERPL-SCNC: 136 MMOL/L (ref 135–147)
WBC # BLD AUTO: 11.37 THOUSAND/UL (ref 4.31–10.16)

## 2023-12-06 PROCEDURE — 80053 COMPREHEN METABOLIC PANEL: CPT

## 2023-12-06 PROCEDURE — 85007 BL SMEAR W/DIFF WBC COUNT: CPT

## 2023-12-06 PROCEDURE — 85027 COMPLETE CBC AUTOMATED: CPT

## 2023-12-06 PROCEDURE — 36415 COLL VENOUS BLD VENIPUNCTURE: CPT

## 2023-12-06 PROCEDURE — 87086 URINE CULTURE/COLONY COUNT: CPT

## 2023-12-06 NOTE — TELEPHONE ENCOUNTER
Patient recently treated for epididymoorchitis through PCP and was prescribed Bactrim. US from 11/26 confirmed this as well as a small right sided hydrocele. Swelling may simply be from the hydrocele which can increase and decrease is size, but will not resolve entirely without surgical removal. I have placed orders for repeat urine testing. Is he experiencing any urinary complaints? Has the swelling gotten worse since his last US on 11/26 or has it remained the same?

## 2023-12-06 NOTE — TELEPHONE ENCOUNTER
Pt of Dr. Bear Lerma calling with concerns of swollen testicle for about two weeks now. He went to the ED on 11/26/23 for this issue and still going on. Has been icing and elevating his testicles which isn't helping much. States that one side of his testicle is about 5 times the size of his other one. Denies fevers or any other symptoms. Please advise if he should have imaging. Did send to scheduling for a follow up appt. Answer Assessment - Initial Assessment Questions  1. SCROTAL SWELLING: "What does the scrotum look like?" "How swollen is it?" (mild, moderate severe; compare to other side)      5 times as big   2. LOCATION: "Where is the swelling located?"      Right testicle   3. ONSET: "When did the swelling start?"      Since November 24th   4. PATTERN: "Does it come and go, or has it been constant since it started?"      Constant   5. SCROTAL PAIN: "Is there any pain?" If Yes, ask: "How bad is it?"  (Scale 1-10; or mild, moderate, severe)      Mild   7.  OTHER SYMPTOMS: "Do you have any other symptoms?" (e.g., fever, abdominal pain, vomiting, difficulty passing urine)      No    Protocols used: Scrotum Swelling-ADULT-[place]

## 2023-12-06 NOTE — PRE-PROCEDURE INSTRUCTIONS
Pre-Surgery Instructions:   Medication Instructions    Alpha-Lipoic Acid 600 MG CAPS Stop taking 7 days prior to surgery. bisoprolol-hydrochlorothiazide (ZIAC) 2.5-6.25 MG per tablet Hold day of surgery. Coenzyme Q10-Red Yeast Rice  MG CAPS Stop taking 7 days prior to surgery. Cranberry 500 MG CAPS Stop taking 7 days prior to surgery. Multiple Vitamins-Minerals (PreserVision AREDS 2) CAPS Stop taking 7 days prior to surgery. Omega-3 Fatty Acids (FISH OIL OMEGA-3 PO) Stop taking 7 days prior to surgery. Probiotic Product (PROBIOTIC PO) Stop taking 7 days prior to surgery. Saw [place] 160 MG CAPS Stop taking 7 days prior to surgery. sildenafil (VIAGRA) 50 MG tablet Uses PRN- DO NOT take day of surgery    tamsulosin (FLOMAX) 0.4 mg Take night before surgery   Medication instructions for day surgery reviewed. Please use only a sip of water to take your instructed medications. Avoid all over the counter vitamins, supplements and NSAIDS for one week prior to surgery per anesthesia guidelines. Tylenol is ok to take as needed. You will receive a call one business day prior to surgery with an arrival time and hospital directions. If your surgery is scheduled on a Monday, the hospital will be calling you on the Friday prior to your surgery. If you have not heard from anyone by 8pm, please call the hospital supervisor through the hospital  at [phone]. Alex Brownlee [phone]). Do not eat or drink anything after midnight the night before your surgery, including candy, mints, lifesavers, or chewing gum. Do not drink alcohol 24hrs before your surgery. Try not to smoke at least 24hrs before your surgery. Follow the pre surgery showering instructions as listed in the [facility] Booklet” or otherwise provided by your surgeon's office. Do not use a blade to shave the surgical area 1 week before surgery.  It is okay to use a clean electric clippers up to 24 hours before surgery. Do not apply any lotions, creams, including makeup, cologne, deodorant, or perfumes after showering on the day of your surgery. Do not use dry shampoo, hair spray, hair gel, or any type of hair products. No contact lenses, eye make-up, or artificial eyelashes. Remove nail polish, including gel polish, and any artificial, gel, or acrylic nails if possible. Remove all jewelry including rings and body piercing jewelry. Wear causal clothing that is easy to take on and off. Consider your type of surgery. Keep any valuables, jewelry, piercings at home. Please bring any specially ordered equipment (sling, braces) if indicated. Arrange for a responsible person to drive you to and from the hospital on the day of your surgery. Visitor Guidelines discussed. Call the surgeon's office with any new illnesses, exposures, or additional questions prior to surgery. Please reference your [facility] Booklet” for additional information to prepare for your upcoming surgery.

## 2023-12-07 ENCOUNTER — OFFICE VISIT (OUTPATIENT)
Dept: UROLOGY | Facility: CLINIC | Age: 73
End: 2023-12-07
Payer: MEDICARE

## 2023-12-07 ENCOUNTER — HOSPITAL ENCOUNTER (OUTPATIENT)
Dept: ULTRASOUND IMAGING | Facility: HOSPITAL | Age: 73
Discharge: HOME | End: 2023-12-07
Payer: MEDICARE

## 2023-12-07 VITALS
HEART RATE: 77 BPM | DIASTOLIC BLOOD PRESSURE: 62 MMHG | HEIGHT: 70 IN | OXYGEN SATURATION: 94 % | SYSTOLIC BLOOD PRESSURE: 110 MMHG | WEIGHT: 167.8 LBS | BODY MASS INDEX: 24.02 KG/M2

## 2023-12-07 DIAGNOSIS — N50.89 TESTICULAR SWELLING: Primary | ICD-10-CM

## 2023-12-07 DIAGNOSIS — N45.3 EPIDIDYMOORCHITIS: ICD-10-CM

## 2023-12-07 DIAGNOSIS — N50.89 TESTICULAR SWELLING: ICD-10-CM

## 2023-12-07 LAB — BACTERIA UR CULT: NORMAL

## 2023-12-07 PROCEDURE — 76870 US EXAM SCROTUM: CPT

## 2023-12-07 PROCEDURE — 99213 OFFICE O/P EST LOW 20 MIN: CPT

## 2023-12-07 RX ORDER — LEVOFLOXACIN 500 MG/1
500 TABLET, FILM COATED ORAL EVERY 24 HOURS
Qty: 14 TABLET | Refills: 0 | Status: SHIPPED | OUTPATIENT
Start: 2023-12-07 | End: 2023-12-21

## 2023-12-07 NOTE — PROGRESS NOTES
12/7/2023    Chief Complaint   Patient presents with    Follow-up     Pt c/o testicular swelling. Pt was placed on antibiotic by PCP 11/24/2023 then couple days later noticed the swelling. Pt submitted urine yesterday: pending. Pt has prostates cancer and will start radiation treatment shortly       Assessment and Plan    68 y.o. male     1. Epididymoorchitis   Acute onset right testicular pain, swelling, and dysuria 2 weeks ago. Was treated by PCP for suspected epididymoorchitis and prescribed Bactrim for 10 days. Was seen in the ER on 11/26 and US of scrotum and testicles confirmed findings of right epididymoorchitis with small right hydrocele. He completed Bactrim on 11/30. Still with persistent right testicular swelling despite completion of Bactrim. Voiding symptoms have resolved. No significant pain. On exam, patient has moderate swelling to the right testicle. Right testicle is firm throughout. There is no pain or tenderness on exam, there is mild swelling to the epididymal tail no swelling to the vas deferens. No scrotal swelling, crepitus, or erythema. Left testicle is normal. It is difficult to differentiate if swelling is from the right testicle versus a complex hydrocele. Due to mild leukocytosis on pre-op labs yesterday with recent epididymoorchitis I am recommending addition course of antibiotics and have prescribed Levaquin 500 mg daily for 14 days. I am also recommending a stat repeat US of the scrotum and testicles. He is currently scheduled for SpaceOar and fiducial marker placement on 12/13. We can reassess his swelling the morning of and determine if patient is cleared to proceed with his procedure. Subjective:    He presents today reporting persistent right testicular swelling since completion of 10 day course of Bactrim on 11/30. His voiding symptms have since resolved. He had initial improvement in the testicular swelling but has noticed increased swelling over the past week or so. He deneis any pain unless he accidentally sits on the testicle due to the swelling. No voiding symptoms either. Review of labs from yesterday shows mild leukocytosis of 11.37. Urine culture is pending with negative culture on 11/26. History of Present Illness  Steve Pickens is a 68 y.o. male here for evaluation of testicular swelling. Established patient known to Dr. Vitaly León but new to me with history of prostate cancer and bladder cancer. Presents today for evaluation of testicular swelling. He was recently treated for epididymoorchitis through his PCP and was prescribed Bactrim for 10 days. Pain would eventually worsen and he was seen at [facility] emergency department on 11/26/2023 where scrotal ultrasound confirmed right epididymal orchitis with a small right hydrocele. He was recommended to continue his Bactrim. He contacted our office complaining of persistent testicular swelling and presents for evaluation today. Prostate cancer: Underwent transperineal prostate biopsy for elevated PSA September 25, 2023 by Dr. Salvador Leon. He has Oakley 3 posterior equal 6 prostate cancer the right anterior medial, Tracy 3+4 equal 7 left anterior medial, Oakley 3+4 equal 7 anterior region of interest by MRI. He is most interested in radiation. Prebiopsy PSA was 4.69 multiparametric MRI showed 53 cc gland with a category 5 lesion mid anterior prostate and midline involving the anterior fibromuscular stroma and anterior transition zone. No evidence of any spread. He was seen by Dr. Lon Goldman with radiation oncology and is scheduled for SpaceOAR and fiducial marker placement with Dr. John Torres on 12/13/2023. Bladder cancer: status post TURBT of recurrence of bladder cancer February 22, 2019 with mitomycin. 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   Previous TURBT was April 25, 2018 with posterior wall tumor and a tumor of low malignant potential at the left ureteral orifice. Underwent 6 weeks of BCG after the recurrence. Last cystoscopy was by Dr. Jayda Chowdhury April 13, 2023 which was negative. Review of Systems   Constitutional:  Negative for chills and fever. HENT:  Negative for congestion and sore throat. Respiratory:  Negative for cough and shortness of breath. Cardiovascular:  Negative for chest pain and leg swelling. Gastrointestinal:  Negative for abdominal pain, constipation and diarrhea. Genitourinary:  Positive for testicular pain (swelling). Negative for difficulty urinating, dysuria, frequency, hematuria and urgency. Musculoskeletal:  Negative for back pain and gait problem. Skin:  Negative for wound. Allergic/Immunologic: Negative for immunocompromised state. Hematological:  Does not bruise/bleed easily. Vitals  Vitals:    12/07/23 1117   BP: 110/62   Pulse: 77   SpO2: 94%   Weight: 76.1 kg (167 lb 12.8 oz)   Height: 5' 10" (1.778 m)       Physical Exam  Vitals reviewed. Constitutional:       General: He is not in acute distress. Appearance: Normal appearance. He is not ill-appearing or toxic-appearing. HENT:      Head: Normocephalic and atraumatic. Eyes:      General: No scleral icterus. Conjunctiva/sclera: Conjunctivae normal.   Cardiovascular:      Rate and Rhythm: Normal rate. Pulmonary:      Effort: Pulmonary effort is normal. No respiratory distress. Abdominal:      Tenderness: There is no right CVA tenderness or left CVA tenderness. Hernia: No hernia is present. Genitourinary:     Comments: moderate swelling to the right testicle. Right testicle is firm throughout. There is no pain or tenderness on exam, there is mild swelling to the epididymal tail no swelling to the vas deferens. No scrotal swelling, crepitus, or erythema.  Left testicle is normal. It is difficult to differentiate if swelling is from the right testicle versus a complex hydrocele. Musculoskeletal:      Cervical back: Normal range of motion. Right lower leg: No edema. Left lower leg: No edema. Skin:     General: Skin is warm and dry. Coloration: Skin is not jaundiced or pale. Neurological:      General: No focal deficit present. Mental Status: He is alert and oriented to person, place, and time. Mental status is at baseline. Gait: Gait normal.   Psychiatric:         Mood and Affect: Mood normal.         Behavior: Behavior normal.         Thought Content:  Thought content normal.         Judgment: Judgment normal.         Past History  Past Medical History:   Diagnosis Date    Arthritis     Bladder cancer ([address]) 11/29/17    superficial    Bladder tumor     BPH with obstruction/lower urinary tract symptoms     Cancer ([address])     bladder    Cold     11/15 symptoms/to call Dr Jayda Chowdhury if they persist or worsen    Dental crowns present     Elevated PSA     Hematuria, microscopic     History of total left knee replacement     twice one 2011/ and revision 2017    Hyperlipidemia     Hypertension     Irregular heart beat     not on EKGs anymore    Neuropathy     in feet    Prediabetes     Prostate cancer ([address])     Prostatitis, acute 09/18/2017    Seasonal allergies     Tooth missing     Wears glasses      Social History     Socioeconomic History    Marital status: /Civil Union     Spouse name: None    Number of children: None    Years of education: None    Highest education level: None   Occupational History    None   Tobacco Use    Smoking status: Never    Smokeless tobacco: Never   Vaping Use    Vaping Use: Never used   Substance and Sexual Activity    Alcohol use: Not Currently     Comment: very infrequent social drinker    Drug use: Never    Sexual activity: Not Currently     Partners: Female     Birth control/protection: Condom Male     Comment: defer   Other Topics Concern    None   Social History Narrative    None     Social Determinants of Health     Financial Resource Strain: Not on file   Food Insecurity: Not on file   Transportation Needs: Not on file   Physical Activity: Not on file   Stress: Not on file   Social Connections: Not on file   Intimate Partner Violence: Not on file   Housing Stability: Not on file     Social History     Tobacco Use   Smoking Status Never   Smokeless Tobacco Never     Family History   Problem Relation Age of Onset    Heart failure Mother     Stomach cancer Father 79    Heart attack Father     Hypertension Father     Cancer Sister 76        blood cancer       The following portions of the patient's history were reviewed and updated as appropriate allergies, current medications, past medical history, past social history, past surgical history and problem list    Imagin/26/2023  SCROTAL ULTRASOUND     INDICATION:    right testicle pain. COMPARISON: None     TECHNIQUE:   Ultrasound the scrotal contents was performed with a high frequency linear transducer utilizing volumetric sweep imaging as well as standard still image techniques. Imaging performed in longitudinal and transverse orientation. Color and   spectral Doppler evaluation also performed bilaterally. FINDINGS:     TESTES:  Testes are symmetric and normal in size. RIGHT testis = 3.4 x 2.7 x 2.9 cm. Volume 14.2 mL  Normal contour with homogeneous smooth echotexture. No intratesticular mass lesion or calcifications. LEFT testis = 4.1 x 2.1 x 2.6 cm. Volume 11.6 mL  Normal contour with homogeneous smooth echotexture. No intratesticular mass lesion or calcifications. Doppler flow within both testes is present and is increased on the right. EPIDIDYMIDES:  Normal Size. Doppler ultrasound demonstrates hyperemia in the right epididymis. There are small epididymal cyst(s) in the left epididymis. Otherwise unremarkable. HYDROCELE: Small right hydrocele. VARICOCELE:  None present.      SCROTUM:  Scrotal thickness and appearance within normal limits. No evidence for extratesticular mass or hernia demonstrated. IMPRESSION:     Right epididymoorchitis with small right hydrocele. Results  No results found for this or any previous visit (from the past 1 hour(s)).]  Lab Results   Component Value Date    PSA 4.69 (H) 06/05/2023    PSA 3.3 07/05/2022    PSA 2.8 09/20/2021    PSA 3.1 08/28/2020     Lab Results   Component Value Date    CALCIUM 9.0 12/06/2023    K 3.8 12/06/2023    CO2 27 12/06/2023     12/06/2023    BUN 17 12/06/2023    CREATININE 0.93 12/06/2023     Lab Results   Component Value Date    WBC 11.37 (H) 12/06/2023    HGB 12.2 12/06/2023    HCT 36.7 12/06/2023    MCV 90 12/06/2023     12/06/2023       Please Note:  Voice dictation software has been used to create this document. There may be inadvertent transcriptions errors.      IRENE Grullon 12/07/23

## 2023-12-08 ENCOUNTER — TELEPHONE (OUTPATIENT)
Dept: UROLOGY | Facility: CLINIC | Age: 73
End: 2023-12-08

## 2023-12-08 NOTE — RESULT ENCOUNTER NOTE
Please let patient know his repeat Scrotal US shows persistent right-sided epididymoorchitis as well as increased size of the right hydrocele which now appears complex. US findings are consistent with my office exam as already discussed with him. He should continue Levaquin as prescribed and we will reassess him the morning of his procedure. Dr. Rice  to be cc'd on result as FYI.

## 2023-12-08 NOTE — TELEPHONE ENCOUNTER
Called and spoke with patient. He saw Brayden Jacobs yesterday and knows the plan of care. Informed we will call once results are back.

## 2023-12-08 NOTE — TELEPHONE ENCOUNTER
----- Message from [employer], [address] [place] [address] sent at 12/8/2023  9:30 AM EST -----  Please let patient know his repeat Scrotal US shows persistent right-sided epididymoorchitis as well as increased size of the right hydrocele which now appears complex. US findings are consistent with my office exam as already discussed with him. He should continue Levaquin as prescribed and we will reassess him the morning of his procedure. Dr. Juliana Minor to be cc'd on result as TCI.

## 2023-12-11 NOTE — TELEPHONE ENCOUNTER
Received transferred call from patient and reviewed US results and IRENE Cueto's note. Patient states he is feeling better at this time and will continue Levaquin as ordered. Aware plan to re-eval at upcoming procedure.

## 2023-12-11 NOTE — TELEPHONE ENCOUNTER
2nd voicemail message left for patient to return call to relay IRENE Ross's note below regarding scrotal US results. Patient is scheduled for upcoming procedure on 12/13 with Dr. Leatha Hector and MD to reassess at that time.

## 2023-12-13 ENCOUNTER — ANESTHESIA (OUTPATIENT)
Dept: PERIOP | Facility: HOSPITAL | Age: 73
End: 2023-12-13
Payer: MEDICARE

## 2023-12-13 ENCOUNTER — HOSPITAL ENCOUNTER (OUTPATIENT)
Facility: HOSPITAL | Age: 73
Setting detail: OUTPATIENT SURGERY
Discharge: HOME | End: 2023-12-13
Attending: UROLOGY | Admitting: UROLOGY
Payer: MEDICARE

## 2023-12-13 VITALS
WEIGHT: 163.58 LBS | BODY MASS INDEX: 23.42 KG/M2 | SYSTOLIC BLOOD PRESSURE: 121 MMHG | RESPIRATION RATE: 16 BRPM | HEIGHT: 70 IN | DIASTOLIC BLOOD PRESSURE: 76 MMHG | TEMPERATURE: 97.6 F | OXYGEN SATURATION: 98 % | HEART RATE: 79 BPM

## 2023-12-13 PROCEDURE — A4648 IMPLANTABLE TISSUE MARKER: HCPCS | Performed by: UROLOGY

## 2023-12-13 PROCEDURE — NC001 PR NO CHARGE: Performed by: UROLOGY

## 2023-12-13 PROCEDURE — 55874 TPRNL PLMT BIODEGRDABL MATRL: CPT | Performed by: UROLOGY

## 2023-12-13 PROCEDURE — 55876 PLACE RT DEVICE/MARKER PROS: CPT | Performed by: UROLOGY

## 2023-12-13 PROCEDURE — C1889 IMPLANT/INSERT DEVICE, NOC: HCPCS | Performed by: UROLOGY

## 2023-12-13 DEVICE — STERILE PLACEMENT NEEDLES (17GA ETW X 20CM) WITH BONE WAX AND (1.2 X 3MM) SOFT TISSUE GOLD MARKER [3]
Type: IMPLANTABLE DEVICE | Site: PERIANAL | Status: FUNCTIONAL
Brand: FIDUCIAL MARKER KIT

## 2023-12-13 DEVICE — SPACEOAR SYSTEMS
Type: IMPLANTABLE DEVICE | Site: PERIANAL | Status: FUNCTIONAL
Brand: SPACEOAR VUE™ SYSTEM - 10ML

## 2023-12-13 RX ORDER — PROPOFOL 10 MG/ML
INJECTION, EMULSION INTRAVENOUS AS NEEDED
Status: DISCONTINUED | OUTPATIENT
Start: 2023-12-13 | End: 2023-12-13

## 2023-12-13 RX ORDER — SODIUM CHLORIDE 9 MG/ML
125 INJECTION, SOLUTION INTRAVENOUS CONTINUOUS
Status: DISCONTINUED | OUTPATIENT
Start: 2023-12-13 | End: 2023-12-13 | Stop reason: HOSPADM

## 2023-12-13 RX ORDER — MIDAZOLAM HYDROCHLORIDE 2 MG/2ML
INJECTION, SOLUTION INTRAMUSCULAR; INTRAVENOUS AS NEEDED
Status: DISCONTINUED | OUTPATIENT
Start: 2023-12-13 | End: 2023-12-13

## 2023-12-13 RX ORDER — FENTANYL CITRATE 50 UG/ML
INJECTION, SOLUTION INTRAMUSCULAR; INTRAVENOUS AS NEEDED
Status: DISCONTINUED | OUTPATIENT
Start: 2023-12-13 | End: 2023-12-13

## 2023-12-13 RX ORDER — EPHEDRINE SULFATE 50 MG/ML
INJECTION INTRAVENOUS AS NEEDED
Status: DISCONTINUED | OUTPATIENT
Start: 2023-12-13 | End: 2023-12-13

## 2023-12-13 RX ORDER — CEFAZOLIN SODIUM 1 G/50ML
1000 SOLUTION INTRAVENOUS ONCE
Status: COMPLETED | OUTPATIENT
Start: 2023-12-13 | End: 2023-12-13

## 2023-12-13 RX ORDER — MAGNESIUM HYDROXIDE 1200 MG/15ML
LIQUID ORAL AS NEEDED
Status: DISCONTINUED | OUTPATIENT
Start: 2023-12-13 | End: 2023-12-13 | Stop reason: HOSPADM

## 2023-12-13 RX ORDER — HYDROCODONE BITARTRATE AND ACETAMINOPHEN 5; 325 MG/1; MG/1
1 TABLET ORAL EVERY 6 HOURS PRN
Status: DISCONTINUED | OUTPATIENT
Start: 2023-12-13 | End: 2023-12-13 | Stop reason: HOSPADM

## 2023-12-13 RX ORDER — LIDOCAINE HYDROCHLORIDE 20 MG/ML
INJECTION, SOLUTION EPIDURAL; INFILTRATION; INTRACAUDAL; PERINEURAL AS NEEDED
Status: DISCONTINUED | OUTPATIENT
Start: 2023-12-13 | End: 2023-12-13

## 2023-12-13 RX ORDER — ONDANSETRON 2 MG/ML
4 INJECTION INTRAMUSCULAR; INTRAVENOUS EVERY 6 HOURS PRN
Status: DISCONTINUED | OUTPATIENT
Start: 2023-12-13 | End: 2023-12-13 | Stop reason: HOSPADM

## 2023-12-13 RX ORDER — FENTANYL CITRATE 50 UG/ML
50 INJECTION, SOLUTION INTRAMUSCULAR; INTRAVENOUS
Status: DISCONTINUED | OUTPATIENT
Start: 2023-12-13 | End: 2023-12-13 | Stop reason: HOSPADM

## 2023-12-13 RX ORDER — LIDOCAINE HCL/EPINEPHRINE/PF 2%-1:200K
VIAL (ML) INJECTION AS NEEDED
Status: DISCONTINUED | OUTPATIENT
Start: 2023-12-13 | End: 2023-12-13 | Stop reason: HOSPADM

## 2023-12-13 RX ORDER — ONDANSETRON 2 MG/ML
INJECTION INTRAMUSCULAR; INTRAVENOUS AS NEEDED
Status: DISCONTINUED | OUTPATIENT
Start: 2023-12-13 | End: 2023-12-13

## 2023-12-13 RX ADMIN — SODIUM CHLORIDE 125 ML/HR: 0.9 INJECTION, SOLUTION INTRAVENOUS at 09:30

## 2023-12-13 RX ADMIN — SODIUM CHLORIDE: 0.9 INJECTION, SOLUTION INTRAVENOUS at 13:27

## 2023-12-13 RX ADMIN — FENTANYL CITRATE 25 MCG: 50 INJECTION INTRAMUSCULAR; INTRAVENOUS at 13:15

## 2023-12-13 RX ADMIN — EPHEDRINE SULFATE 10 MG: 50 INJECTION, SOLUTION INTRAVENOUS at 13:25

## 2023-12-13 RX ADMIN — ONDANSETRON 4 MG: 2 INJECTION INTRAMUSCULAR; INTRAVENOUS at 13:11

## 2023-12-13 RX ADMIN — FENTANYL CITRATE 25 MCG: 50 INJECTION INTRAMUSCULAR; INTRAVENOUS at 13:13

## 2023-12-13 RX ADMIN — MIDAZOLAM 1 MG: 1 INJECTION INTRAMUSCULAR; INTRAVENOUS at 13:05

## 2023-12-13 RX ADMIN — CEFAZOLIN SODIUM 1000 MG: 1 SOLUTION INTRAVENOUS at 13:05

## 2023-12-13 RX ADMIN — MIDAZOLAM 1 MG: 1 INJECTION INTRAMUSCULAR; INTRAVENOUS at 12:58

## 2023-12-13 RX ADMIN — PROPOFOL 150 MG: 10 INJECTION, EMULSION INTRAVENOUS at 13:08

## 2023-12-13 RX ADMIN — LIDOCAINE HYDROCHLORIDE 100 MG: 20 INJECTION, SOLUTION EPIDURAL; INFILTRATION; INTRACAUDAL at 13:08

## 2023-12-13 NOTE — INTERVAL H&P NOTE
H&P reviewed. After examining the patient I find no changes in the patients condition since the H&P had been written.     Vitals:    12/13/23 0909   BP: 136/76   Pulse: 81   Resp: 16   Temp: 97.7 °F (36.5 °C)   SpO2: 98%

## 2023-12-13 NOTE — OP NOTE
OPERATIVE REPORT  PATIENT NAME: Mayank Linares    :  1950  MRN: [id number]  Pt Location: [facility] OR ROOM 04    SURGERY DATE: 2023    Surgeons and Role:     * Ricardo Hopson MD - Primary    Preop Diagnosis:  Malignant neoplasm of prostate ([address]) [C61]-planning radiation therapy    Post-Op Diagnosis Codes:     * Malignant neoplasm of prostate ([address]) [facility]  -Planning radiation therapy  Procedure(s):  INSERTION OF FIDUCIAL MARKER. SPACEOAR-transrectal ultrasound-guided transperineal percutaneous placement of gold prostatic fiducial markers and hydrogel SpaceOAR    Specimen(s):  * No specimens in log *    Estimated Blood Loss:   Minimal    Drains:  * No LDAs found *    Anesthesia Type:   IV Sedation with Anesthesia    Operative Indications:  Malignant neoplasm of prostate ([address]) [facility]  -Planning radiation therapy    Operative Findings:  Scattered transitional zone calcifications, normal seminal vesicles, prostate enlargement    Gold fiducial markers placed in the left base and left apex of the prostate as well as right mid gland. Hydrogel spacer placed and perirectal fat from base to apex of the prostate with good symmetrical distribution in the transverse plane. Complications:   None    Procedure and Technique: I saw the patient in the holding area reviewed the procedure as proposed answered all patient questions performed history and physical and discussed risks and complications of the proposed procedure with the patient giving verbal and signed informed consent. The patient was taken the operating room identified by the surgeon placed in the dorsolithotomy position after general LMA anesthesia was induced and appropriate timeout took place.   After prepping the perineum and injecting 2% lidocaine with epinephrine into the perineal skin and immediate subcutaneous tissue as well as in the periapical subcutaneous tissue under ultrasound guidance placement of gold fiducial markers took place under transrectal ultrasound guidance in the positions above and a percutaneous fashion. After placement of gold fiducial markers a long spinal needle was placed over the rectal hump in the midline into perirectal fat and advanced to the base of the prostate. Puffs of saline confirmed good positioning when the needle was pulled back to mid gland aspiration revealed no evidence of bloody backflow. 10 cc of hydrogel SpaceOAR was then injected in the perirectal fat with excellent dispersion. The spinal needle was removed and then the transrectal ultrasound was removed with the patient tolerating the procedure well and being transferred to PACU in good condition. The patient was discharged in good condition without complication will follow-up with radiation oncology as scheduled and then 3 months after completing radiation therapy obtain a PSA and see the urologic provider. The patient tolerated the procedure well. I was present for the entire procedure. and I was present for all critical portions of the procedure.     Patient Disposition:  PACU         SIGNATURE: Jabari Castro MD  DATE: December 13, 2023  TIME: 1:05 PM

## 2023-12-13 NOTE — ANESTHESIA POSTPROCEDURE EVALUATION
Post-Op Assessment Note    CV Status:  Stable    Pain management: adequate       Mental Status:  Alert and awake   Hydration Status:  Euvolemic   PONV Controlled:  Controlled   Airway Patency:  Patent  Two or more mitigation strategies used for obstructive sleep apnea   Post Op Vitals Reviewed: Yes      Staff: Anesthesiologist               BP      Temp      Pulse     Resp      SpO2      /76   Pulse 79   Temp 97.6 °F (36.4 °C) (Temporal)   Resp 16   Ht 5' 10" (1.778 m)   Wt 74.2 kg (163 lb 9.3 oz)   SpO2 98%   BMI 23.47 kg/m²

## 2023-12-13 NOTE — PROGRESS NOTES
Discharge instructions reviewed, fluids encouraged, reasons to call the offfice and reasons to call 911

## 2023-12-13 NOTE — ANESTHESIA PREPROCEDURE EVALUATION
Procedure:  INSERTION OF FIDUCIAL MARKER, SPACEOAR (Anus)    Relevant Problems   CARDIO   (+) Chest pain   (+) Hyperlipidemia   (+) Hypertension   (+) Pure hypercholesterolemia      /RENAL   (+) Acute prostatitis   (+) Prostate cancer [facility])        Physical Exam    Airway    Mallampati score: II         Dental       Cardiovascular  Rhythm: regular    Pulmonary   Breath sounds clear to auscultation    Other Findings        Anesthesia Plan  ASA Score- 3     Anesthesia Type- general with ASA Monitors. Additional Monitors:     Airway Plan:            Plan Factors-Exercise tolerance (METS): >4 METS. Chart reviewed. EKG reviewed. Existing labs reviewed. Patient summary reviewed. Patient is not a current smoker. Patient not instructed to abstain from smoking on day of procedure. Obstructive sleep apnea risk education given perioperatively. Induction- intravenous. Postoperative Plan-     Informed Consent- Anesthetic plan and risks discussed with patient.

## 2023-12-18 PROCEDURE — 77263 THER RADIOLOGY TX PLNG CPLX: CPT | Performed by: RADIOLOGY

## 2023-12-19 ENCOUNTER — APPOINTMENT (OUTPATIENT)
Dept: RADIATION ONCOLOGY | Facility: CLINIC | Age: 73
End: 2023-12-19
Payer: MEDICARE

## 2023-12-19 PROCEDURE — 77334 RADIATION TREATMENT AID(S): CPT | Performed by: RADIOLOGY

## 2023-12-21 ENCOUNTER — PATIENT OUTREACH (OUTPATIENT)
Dept: HEMATOLOGY ONCOLOGY | Facility: CLINIC | Age: 73
End: 2023-12-21

## 2023-12-21 ENCOUNTER — TELEPHONE (OUTPATIENT)
Dept: RADIATION ONCOLOGY | Facility: CLINIC | Age: 73
End: 2023-12-21

## 2023-12-21 NOTE — PROGRESS NOTES
Outreach made to patient to see if he has any questions prior to starting RT. He wants to know if he is able to continue eating about a cup of oatmeal (Rolled oats whole grain) daily. I told patient  could get message over to RAD ONC RN to address with patient being that there are some diet recommendations for RT. He voiced understanding.

## 2023-12-22 ENCOUNTER — APPOINTMENT (OUTPATIENT)
Dept: RADIATION ONCOLOGY | Facility: CLINIC | Age: 73
End: 2023-12-22
Payer: MEDICARE

## 2023-12-22 PROCEDURE — 77300 RADIATION THERAPY DOSE PLAN: CPT | Performed by: RADIOLOGY

## 2023-12-22 PROCEDURE — 77301 RADIOTHERAPY DOSE PLAN IMRT: CPT | Performed by: RADIOLOGY

## 2023-12-22 PROCEDURE — 77338 DESIGN MLC DEVICE FOR IMRT: CPT | Performed by: RADIOLOGY

## 2023-12-27 ENCOUNTER — TELEPHONE (OUTPATIENT)
Dept: GENETICS | Facility: CLINIC | Age: 73
End: 2023-12-27

## 2023-12-27 NOTE — TELEPHONE ENCOUNTER
Post-Test Genetic Counseling Consult Note    Today I spoke with Ed over the phone to review the results of his genetic test for hereditary cancer. We met previously on 11/30/23 for pre-test counseling.  A copy of this consult note and genetic test result will be shared with the patient.      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 The significance of the  CTNNA1  variant is currently not known and therefore this test result cannot be used to help determine Ed cancer risks.      It is possible that the variant was seen in only a handful of individuals, or there may be conflicting or incomplete information in the medical literature about the variant and its association with hereditary cancer.     The laboratory will continue to accumulate information on this variant and will reclassify it as either a positive or negative genetic test result when they are confident that they have adequate information. We will notify Ed as updated information is obtained. It is important to note that the majority of variants of uncertain significance are reclassified as likely benign or benign as  additional information about the variant becomes available.     Risk Based on Family History:  The significance of this variant is currently not known and therefore this test result cannot be used to help determine Ed cancer risks. Rather his personal medical history and family history of cancer are the most important factors used to estimate his risk for developing certain cancers and to direct his medical management.      Ed's risk of gastric cancer is increased, based on the reported family history. As compared with the general population risk of approximately 0.8%, empiric data suggest that Willys risk to develop gastric cancer is increased by approximately 2-4 times or 1.6-3.2%, given one first-degree relative with gastric cancer (PMID: [id number]).    Risks and Testing for Family Members:  Genetic testing for this variant is not recommended for relatives who wish to determine their cancer risks for purposes of determining medical management. The presence or absence of this variant in a relative is not clinically meaningful unless the variant is reclassified in the future.     Despite this result, Ed's first-degree relatives may be at increased risk for the cancers based on the family history. We recommend they discuss screening and management recommendations with their healthcare providers.    If Ed has any affected family members with a cancer diagnosis, especially at a young age, they may still consider genetic testing. Relatives who wish to pursue genetic testing can reach out to the [facility] [phone] ([phone] to schedule an appointment or visit [email or web address] to identify a local genetic counselor.      Plan:   There are no additional recommendations based on Ed's result. he should continue cancer screening and medical management as clinically indicated and as determined appropriate by his healthcare providers.    VUS Result: Ed was strongly encouraged to contact us regarding  any changes in his personal or family history of cancer as these changes could alter our recommendation regarding genetic testing and/or cancer screening. Ed was also encouraged to follow up with us on an annual basis as variant classifications are subject to change.

## 2023-12-27 NOTE — TELEPHONE ENCOUNTER
I greatly appreciate you doing this and contacting him.  It is always interesting to see given the history.

## 2024-01-04 ENCOUNTER — APPOINTMENT (OUTPATIENT)
Dept: RADIATION ONCOLOGY | Facility: CLINIC | Age: 74
End: 2024-01-04
Payer: MEDICARE

## 2024-01-04 ENCOUNTER — APPOINTMENT (OUTPATIENT)
Dept: RADIATION ONCOLOGY | Facility: CLINIC | Age: 74
End: 2024-01-04
Attending: RADIOLOGY
Payer: MEDICARE

## 2024-01-04 PROCEDURE — 77427 RADIATION TX MANAGEMENT X5: CPT | Performed by: RADIOLOGY

## 2024-01-04 PROCEDURE — 77385 HB NTSTY MODUL RAD TX DLVR SMPL: CPT | Performed by: RADIOLOGY

## 2024-01-04 PROCEDURE — 77014 CHG CT GUIDANCE RADIATION THERAPY FLDS PLACEMENT: CPT | Performed by: RADIOLOGY

## 2024-01-05 ENCOUNTER — APPOINTMENT (OUTPATIENT)
Dept: RADIATION ONCOLOGY | Facility: CLINIC | Age: 74
End: 2024-01-05
Payer: MEDICARE

## 2024-01-08 ENCOUNTER — APPOINTMENT (OUTPATIENT)
Dept: RADIATION ONCOLOGY | Facility: CLINIC | Age: 74
End: 2024-01-08
Payer: MEDICARE

## 2024-01-08 PROCEDURE — 77014 CHG CT GUIDANCE RADIATION THERAPY FLDS PLACEMENT: CPT | Performed by: RADIOLOGY

## 2024-01-08 PROCEDURE — 77385 HB NTSTY MODUL RAD TX DLVR SMPL: CPT | Performed by: RADIOLOGY

## 2024-01-09 ENCOUNTER — APPOINTMENT (OUTPATIENT)
Dept: RADIATION ONCOLOGY | Facility: CLINIC | Age: 74
End: 2024-01-09
Payer: MEDICARE

## 2024-01-09 PROCEDURE — 77014 CHG CT GUIDANCE RADIATION THERAPY FLDS PLACEMENT: CPT | Performed by: RADIOLOGY

## 2024-01-09 PROCEDURE — 77385 HB NTSTY MODUL RAD TX DLVR SMPL: CPT | Performed by: RADIOLOGY

## 2024-01-10 ENCOUNTER — APPOINTMENT (OUTPATIENT)
Dept: RADIATION ONCOLOGY | Facility: CLINIC | Age: 74
End: 2024-01-10
Payer: MEDICARE

## 2024-01-10 PROCEDURE — 77385 HB NTSTY MODUL RAD TX DLVR SMPL: CPT | Performed by: INTERNAL MEDICINE

## 2024-01-10 PROCEDURE — 77014 CHG CT GUIDANCE RADIATION THERAPY FLDS PLACEMENT: CPT | Performed by: INTERNAL MEDICINE

## 2024-01-11 ENCOUNTER — APPOINTMENT (OUTPATIENT)
Dept: RADIATION ONCOLOGY | Facility: CLINIC | Age: 74
End: 2024-01-11
Attending: RADIOLOGY
Payer: MEDICARE

## 2024-01-11 PROCEDURE — 77336 RADIATION PHYSICS CONSULT: CPT | Performed by: RADIOLOGY

## 2024-01-11 PROCEDURE — 77385 HB NTSTY MODUL RAD TX DLVR SMPL: CPT | Performed by: RADIOLOGY

## 2024-01-11 PROCEDURE — 77427 RADIATION TX MANAGEMENT X5: CPT | Performed by: RADIOLOGY

## 2024-01-12 ENCOUNTER — APPOINTMENT (OUTPATIENT)
Dept: RADIATION ONCOLOGY | Facility: CLINIC | Age: 74
End: 2024-01-12
Attending: RADIOLOGY
Payer: MEDICARE

## 2024-01-12 PROCEDURE — 77385 HB NTSTY MODUL RAD TX DLVR SMPL: CPT | Performed by: RADIOLOGY

## 2024-01-12 PROCEDURE — 77014 CHG CT GUIDANCE RADIATION THERAPY FLDS PLACEMENT: CPT | Performed by: RADIOLOGY

## 2024-01-15 ENCOUNTER — APPOINTMENT (OUTPATIENT)
Dept: RADIATION ONCOLOGY | Facility: CLINIC | Age: 74
End: 2024-01-15
Payer: MEDICARE

## 2024-01-15 PROCEDURE — 77385 HB NTSTY MODUL RAD TX DLVR SMPL: CPT | Performed by: RADIOLOGY

## 2024-01-15 PROCEDURE — 77014 CHG CT GUIDANCE RADIATION THERAPY FLDS PLACEMENT: CPT | Performed by: RADIOLOGY

## 2024-01-16 ENCOUNTER — APPOINTMENT (OUTPATIENT)
Dept: RADIATION ONCOLOGY | Facility: CLINIC | Age: 74
End: 2024-01-16
Attending: RADIOLOGY
Payer: MEDICARE

## 2024-01-16 ENCOUNTER — APPOINTMENT (OUTPATIENT)
Dept: RADIATION ONCOLOGY | Facility: CLINIC | Age: 74
End: 2024-01-16
Payer: MEDICARE

## 2024-01-17 ENCOUNTER — APPOINTMENT (OUTPATIENT)
Dept: RADIATION ONCOLOGY | Facility: CLINIC | Age: 74
End: 2024-01-17
Attending: RADIOLOGY
Payer: MEDICARE

## 2024-01-17 PROCEDURE — 77385 HB NTSTY MODUL RAD TX DLVR SMPL: CPT | Performed by: RADIOLOGY

## 2024-01-17 PROCEDURE — 77014 CHG CT GUIDANCE RADIATION THERAPY FLDS PLACEMENT: CPT | Performed by: RADIOLOGY

## 2024-01-18 ENCOUNTER — APPOINTMENT (OUTPATIENT)
Dept: RADIATION ONCOLOGY | Facility: CLINIC | Age: 74
End: 2024-01-18
Payer: MEDICARE

## 2024-01-18 PROCEDURE — 77385 HB NTSTY MODUL RAD TX DLVR SMPL: CPT | Performed by: RADIOLOGY

## 2024-01-18 PROCEDURE — 77014 CHG CT GUIDANCE RADIATION THERAPY FLDS PLACEMENT: CPT | Performed by: RADIOLOGY

## 2024-01-19 ENCOUNTER — APPOINTMENT (OUTPATIENT)
Dept: RADIATION ONCOLOGY | Facility: CLINIC | Age: 74
End: 2024-01-19
Payer: MEDICARE

## 2024-01-19 PROCEDURE — 77385 HB NTSTY MODUL RAD TX DLVR SMPL: CPT | Performed by: RADIOLOGY

## 2024-01-19 PROCEDURE — 77014 CHG CT GUIDANCE RADIATION THERAPY FLDS PLACEMENT: CPT | Performed by: RADIOLOGY

## 2024-01-19 PROCEDURE — 77427 RADIATION TX MANAGEMENT X5: CPT | Performed by: RADIOLOGY

## 2024-01-19 PROCEDURE — 77336 RADIATION PHYSICS CONSULT: CPT | Performed by: RADIOLOGY

## 2024-01-22 ENCOUNTER — APPOINTMENT (OUTPATIENT)
Dept: RADIATION ONCOLOGY | Facility: CLINIC | Age: 74
End: 2024-01-22
Payer: MEDICARE

## 2024-01-22 PROCEDURE — 77385 HB NTSTY MODUL RAD TX DLVR SMPL: CPT | Performed by: RADIOLOGY

## 2024-01-22 PROCEDURE — 77014 CHG CT GUIDANCE RADIATION THERAPY FLDS PLACEMENT: CPT | Performed by: RADIOLOGY

## 2024-01-23 ENCOUNTER — APPOINTMENT (OUTPATIENT)
Dept: RADIATION ONCOLOGY | Facility: CLINIC | Age: 74
End: 2024-01-23
Payer: MEDICARE

## 2024-01-23 PROCEDURE — 77014 CHG CT GUIDANCE RADIATION THERAPY FLDS PLACEMENT: CPT | Performed by: RADIOLOGY

## 2024-01-23 PROCEDURE — 77385 HB NTSTY MODUL RAD TX DLVR SMPL: CPT | Performed by: RADIOLOGY

## 2024-01-24 ENCOUNTER — PATIENT OUTREACH (OUTPATIENT)
Dept: HEMATOLOGY ONCOLOGY | Facility: CLINIC | Age: 74
End: 2024-01-24

## 2024-01-24 ENCOUNTER — DOCUMENTATION (OUTPATIENT)
Dept: HEMATOLOGY ONCOLOGY | Facility: CLINIC | Age: 74
End: 2024-01-24

## 2024-01-24 ENCOUNTER — APPOINTMENT (OUTPATIENT)
Dept: RADIATION ONCOLOGY | Facility: CLINIC | Age: 74
End: 2024-01-24
Payer: MEDICARE

## 2024-01-24 PROCEDURE — 77385 HB NTSTY MODUL RAD TX DLVR SMPL: CPT | Performed by: RADIOLOGY

## 2024-01-24 PROCEDURE — 77014 CHG CT GUIDANCE RADIATION THERAPY FLDS PLACEMENT: CPT | Performed by: RADIOLOGY

## 2024-01-24 NOTE — PROGRESS NOTES
Patient is schedule to complete prostate RT on 2/13/24. Message sent to Urology office to tracy 3 month follow up with PSA PTV.

## 2024-01-24 NOTE — PROGRESS NOTES
MID ASSESSMENT      Are you having any side effects from your treatment?  - urine urgency, weak stream    Are you eating and drinking properly?  - Yes    Do you have any urinary issues?  - urine urgency, weak stream    Are you having any pain?  - No    Have needs changed for a palliative care referral?  - No    How would you describe your mood (Calm, Anxious, Depressed, Tired, Overwhelmed)?  - Calm    Do you know when your upcoming appointments are?  - Yes, MyChart    Do you have a good support system?  - Wife    Are you interested in any support groups?  - No    Do you have any questions or concerns regarding your treatment plan?  - No     Outreach made to patient. We went over MID assessment questions together. He does not have any further questions or concerns at this time but knows I remain available if any arise.

## 2024-01-25 ENCOUNTER — APPOINTMENT (OUTPATIENT)
Dept: RADIATION ONCOLOGY | Facility: CLINIC | Age: 74
End: 2024-01-25
Payer: MEDICARE

## 2024-01-25 PROCEDURE — 77385 HB NTSTY MODUL RAD TX DLVR SMPL: CPT | Performed by: RADIOLOGY

## 2024-01-25 PROCEDURE — 77014 CHG CT GUIDANCE RADIATION THERAPY FLDS PLACEMENT: CPT | Performed by: RADIOLOGY

## 2024-01-26 ENCOUNTER — APPOINTMENT (OUTPATIENT)
Dept: RADIATION ONCOLOGY | Facility: CLINIC | Age: 74
End: 2024-01-26
Payer: MEDICARE

## 2024-01-26 PROCEDURE — 77014 CHG CT GUIDANCE RADIATION THERAPY FLDS PLACEMENT: CPT | Performed by: RADIOLOGY

## 2024-01-26 PROCEDURE — 77427 RADIATION TX MANAGEMENT X5: CPT | Performed by: RADIOLOGY

## 2024-01-26 PROCEDURE — 77336 RADIATION PHYSICS CONSULT: CPT | Performed by: RADIOLOGY

## 2024-01-26 PROCEDURE — 77385 HB NTSTY MODUL RAD TX DLVR SMPL: CPT | Performed by: RADIOLOGY

## 2024-01-29 ENCOUNTER — APPOINTMENT (OUTPATIENT)
Dept: RADIATION ONCOLOGY | Facility: CLINIC | Age: 74
End: 2024-01-29
Payer: MEDICARE

## 2024-01-29 PROCEDURE — 77385 HB NTSTY MODUL RAD TX DLVR SMPL: CPT | Performed by: RADIOLOGY

## 2024-01-29 PROCEDURE — 77014 CHG CT GUIDANCE RADIATION THERAPY FLDS PLACEMENT: CPT | Performed by: RADIOLOGY

## 2024-01-30 ENCOUNTER — APPOINTMENT (OUTPATIENT)
Dept: RADIATION ONCOLOGY | Facility: CLINIC | Age: 74
End: 2024-01-30
Payer: MEDICARE

## 2024-01-30 PROCEDURE — 77385 HB NTSTY MODUL RAD TX DLVR SMPL: CPT | Performed by: RADIOLOGY

## 2024-01-30 PROCEDURE — 77014 CHG CT GUIDANCE RADIATION THERAPY FLDS PLACEMENT: CPT | Performed by: RADIOLOGY

## 2024-01-31 ENCOUNTER — APPOINTMENT (OUTPATIENT)
Dept: RADIATION ONCOLOGY | Facility: CLINIC | Age: 74
End: 2024-01-31
Payer: MEDICARE

## 2024-02-01 ENCOUNTER — APPOINTMENT (OUTPATIENT)
Dept: RADIATION ONCOLOGY | Facility: CLINIC | Age: 74
End: 2024-02-01
Payer: MEDICARE

## 2024-02-01 PROCEDURE — 77385 HB NTSTY MODUL RAD TX DLVR SMPL: CPT | Performed by: RADIOLOGY

## 2024-02-01 PROCEDURE — 77014 CHG CT GUIDANCE RADIATION THERAPY FLDS PLACEMENT: CPT | Performed by: RADIOLOGY

## 2024-02-02 ENCOUNTER — APPOINTMENT (OUTPATIENT)
Dept: RADIATION ONCOLOGY | Facility: CLINIC | Age: 74
End: 2024-02-02
Payer: MEDICARE

## 2024-02-02 PROCEDURE — 77385 HB NTSTY MODUL RAD TX DLVR SMPL: CPT | Performed by: RADIOLOGY

## 2024-02-02 PROCEDURE — 77014 CHG CT GUIDANCE RADIATION THERAPY FLDS PLACEMENT: CPT | Performed by: RADIOLOGY

## 2024-02-05 ENCOUNTER — APPOINTMENT (OUTPATIENT)
Dept: RADIATION ONCOLOGY | Facility: CLINIC | Age: 74
End: 2024-02-05
Payer: MEDICARE

## 2024-02-05 PROCEDURE — 77336 RADIATION PHYSICS CONSULT: CPT | Performed by: RADIOLOGY

## 2024-02-05 PROCEDURE — 77427 RADIATION TX MANAGEMENT X5: CPT | Performed by: RADIOLOGY

## 2024-02-05 PROCEDURE — 77014 CHG CT GUIDANCE RADIATION THERAPY FLDS PLACEMENT: CPT | Performed by: RADIOLOGY

## 2024-02-05 PROCEDURE — 77385 HB NTSTY MODUL RAD TX DLVR SMPL: CPT | Performed by: RADIOLOGY

## 2024-02-06 ENCOUNTER — APPOINTMENT (OUTPATIENT)
Dept: RADIATION ONCOLOGY | Facility: CLINIC | Age: 74
End: 2024-02-06
Payer: MEDICARE

## 2024-02-06 PROCEDURE — 77014 CHG CT GUIDANCE RADIATION THERAPY FLDS PLACEMENT: CPT | Performed by: RADIOLOGY

## 2024-02-06 PROCEDURE — 77385 HB NTSTY MODUL RAD TX DLVR SMPL: CPT | Performed by: RADIOLOGY

## 2024-02-07 ENCOUNTER — APPOINTMENT (OUTPATIENT)
Dept: RADIATION ONCOLOGY | Facility: CLINIC | Age: 74
End: 2024-02-07
Payer: MEDICARE

## 2024-02-07 PROCEDURE — 77385 HB NTSTY MODUL RAD TX DLVR SMPL: CPT | Performed by: INTERNAL MEDICINE

## 2024-02-07 PROCEDURE — 77014 CHG CT GUIDANCE RADIATION THERAPY FLDS PLACEMENT: CPT | Performed by: INTERNAL MEDICINE

## 2024-02-08 ENCOUNTER — APPOINTMENT (OUTPATIENT)
Dept: RADIATION ONCOLOGY | Facility: CLINIC | Age: 74
End: 2024-02-08
Payer: MEDICARE

## 2024-02-08 PROCEDURE — 77014 CHG CT GUIDANCE RADIATION THERAPY FLDS PLACEMENT: CPT | Performed by: RADIOLOGY

## 2024-02-08 PROCEDURE — 77385 HB NTSTY MODUL RAD TX DLVR SMPL: CPT | Performed by: RADIOLOGY

## 2024-02-09 ENCOUNTER — APPOINTMENT (OUTPATIENT)
Dept: RADIATION ONCOLOGY | Facility: CLINIC | Age: 74
End: 2024-02-09
Payer: MEDICARE

## 2024-02-09 PROCEDURE — 77385 HB NTSTY MODUL RAD TX DLVR SMPL: CPT | Performed by: RADIOLOGY

## 2024-02-09 PROCEDURE — 77336 RADIATION PHYSICS CONSULT: CPT | Performed by: RADIOLOGY

## 2024-02-09 PROCEDURE — 77014 CHG CT GUIDANCE RADIATION THERAPY FLDS PLACEMENT: CPT | Performed by: RADIOLOGY

## 2024-02-12 ENCOUNTER — APPOINTMENT (OUTPATIENT)
Dept: RADIATION ONCOLOGY | Facility: CLINIC | Age: 74
End: 2024-02-12
Payer: MEDICARE

## 2024-02-12 PROCEDURE — 77385 HB NTSTY MODUL RAD TX DLVR SMPL: CPT | Performed by: RADIOLOGY

## 2024-02-12 PROCEDURE — 77427 RADIATION TX MANAGEMENT X5: CPT | Performed by: RADIOLOGY

## 2024-02-12 PROCEDURE — 77014 CHG CT GUIDANCE RADIATION THERAPY FLDS PLACEMENT: CPT | Performed by: RADIOLOGY

## 2024-02-13 ENCOUNTER — APPOINTMENT (OUTPATIENT)
Dept: RADIATION ONCOLOGY | Facility: CLINIC | Age: 74
End: 2024-02-13
Payer: MEDICARE

## 2024-02-14 ENCOUNTER — APPOINTMENT (OUTPATIENT)
Dept: RADIATION ONCOLOGY | Facility: CLINIC | Age: 74
End: 2024-02-14
Payer: MEDICARE

## 2024-02-14 ENCOUNTER — APPOINTMENT (OUTPATIENT)
Dept: RADIATION ONCOLOGY | Facility: CLINIC | Age: 74
End: 2024-02-14
Attending: RADIOLOGY
Payer: MEDICARE

## 2024-02-14 PROCEDURE — 77014 CHG CT GUIDANCE RADIATION THERAPY FLDS PLACEMENT: CPT | Performed by: INTERNAL MEDICINE

## 2024-02-14 PROCEDURE — 77385 HB NTSTY MODUL RAD TX DLVR SMPL: CPT | Performed by: INTERNAL MEDICINE

## 2024-02-15 ENCOUNTER — APPOINTMENT (OUTPATIENT)
Dept: RADIATION ONCOLOGY | Facility: CLINIC | Age: 74
End: 2024-02-15
Attending: RADIOLOGY
Payer: MEDICARE

## 2024-02-15 PROCEDURE — 77014 CHG CT GUIDANCE RADIATION THERAPY FLDS PLACEMENT: CPT | Performed by: RADIOLOGY

## 2024-02-15 PROCEDURE — 77385 HB NTSTY MODUL RAD TX DLVR SMPL: CPT | Performed by: RADIOLOGY

## 2024-02-15 PROCEDURE — 77336 RADIATION PHYSICS CONSULT: CPT | Performed by: RADIOLOGY

## 2024-03-04 ENCOUNTER — PATIENT OUTREACH (OUTPATIENT)
Dept: HEMATOLOGY ONCOLOGY | Facility: CLINIC | Age: 74
End: 2024-03-04

## 2024-03-04 NOTE — PROGRESS NOTES
End Of Treatment Assessment      Are you having any lingering side effects from your treatment?  - some urine urgency, some weak stream    Are you interested in any support group information?  - No    Do you know who to call if you have any questions or concerns in the future?  - Yes    Are you aware of your upcoming Urology appointment with PSA prior?  - 4/11/24    Is there anything else I can do for you?  - No    Outreach made to patient to go over end of treatment assessment questions. He does not have any further questions or concerns at this patricia but knows I remain available if any arise.

## 2024-04-08 ENCOUNTER — APPOINTMENT (OUTPATIENT)
Dept: LAB | Age: 74
End: 2024-04-08
Payer: MEDICARE

## 2024-04-08 DIAGNOSIS — C61 PROSTATE CANCER (HCC): ICD-10-CM

## 2024-04-08 LAB — PSA SERPL-MCNC: 1.71 NG/ML (ref 0–4)

## 2024-04-08 PROCEDURE — 36415 COLL VENOUS BLD VENIPUNCTURE: CPT

## 2024-04-08 PROCEDURE — 84153 ASSAY OF PSA TOTAL: CPT

## 2024-04-11 ENCOUNTER — PROCEDURE VISIT (OUTPATIENT)
Dept: UROLOGY | Facility: CLINIC | Age: 74
End: 2024-04-11
Payer: MEDICARE

## 2024-04-11 VITALS
HEART RATE: 68 BPM | SYSTOLIC BLOOD PRESSURE: 122 MMHG | DIASTOLIC BLOOD PRESSURE: 76 MMHG | WEIGHT: 177 LBS | BODY MASS INDEX: 25.4 KG/M2

## 2024-04-11 DIAGNOSIS — C67.1 MALIGNANT NEOPLASM OF DOME OF URINARY BLADDER (HCC): Primary | ICD-10-CM

## 2024-04-11 DIAGNOSIS — C61 PROSTATE CANCER (HCC): ICD-10-CM

## 2024-04-11 LAB
SL AMB  POCT GLUCOSE, UA: NEGATIVE
SL AMB LEUKOCYTE ESTERASE,UA: NEGATIVE
SL AMB POCT BILIRUBIN,UA: NEGATIVE
SL AMB POCT BLOOD,UA: NEGATIVE
SL AMB POCT CLARITY,UA: CLEAR
SL AMB POCT COLOR,UA: YELLOW
SL AMB POCT KETONES,UA: NEGATIVE
SL AMB POCT NITRITE,UA: NEGATIVE
SL AMB POCT PH,UA: 6
SL AMB POCT SPECIFIC GRAVITY,UA: 1.02
SL AMB POCT URINE PROTEIN: NEGATIVE
SL AMB POCT UROBILINOGEN: 0.2

## 2024-04-11 PROCEDURE — 81002 URINALYSIS NONAUTO W/O SCOPE: CPT | Performed by: UROLOGY

## 2024-04-11 PROCEDURE — 52000 CYSTOURETHROSCOPY: CPT | Performed by: UROLOGY

## 2024-04-11 PROCEDURE — 99213 OFFICE O/P EST LOW 20 MIN: CPT | Performed by: UROLOGY

## 2024-04-11 RX ORDER — CEPHALEXIN 500 MG/1
500 CAPSULE ORAL ONCE
Qty: 1 CAPSULE | Refills: 0 | Status: SHIPPED | OUTPATIENT
Start: 2024-04-11 | End: 2024-04-11

## 2024-04-11 NOTE — PROGRESS NOTES
UROLOGY PROGRESS NOTE   [facility]  [address]  [address]  [place] [address]  [phone]  Fax:[phone]  [email or web address]      NAME: Ed Barnes  AGE: 73 y.o. SEX: male  : 1950   MRN: [id number]    DATE: 2024  TIME: 12:03 PM    Assessment and Plan:    Prostate cancer status post IMRT completed 2024-PSA down to 1.71.  Follow-up in 6 months with another PSA.    Bladder cancer surveillance: No recurrence for 5 years, can stop surveillance.  We will check his urinalysis yearly.  If he develops new hematuria or gross hematuria we will repeat scope.               Chief Complaint   No chief complaint on file.      History of Present Illness   Prostate cancer: Underwent transperineal prostate biopsy for elevated PSA 2023 by Dr. Fuentes.  He has Tracy 3 posterior equal 6 prostate cancer the right anterior medial, Tracy 3+4 equal 7 left anterior medial, Tracy 3+4 equal 7 anterior region of interest by MRI.  I have already called him with results.  He is most interested in radiation.  Prebiopsy PSA was 4.69 multiparametric MRI showed 53 cc gland with a category 5 lesion mid anterior prostate and midline involving the anterior fibromuscular stroma and anterior transition zone.  No evidence of any spread.  Completed IMRT 2024.     Component  Ref Range & Units 24 12:14 PM 23 11:05 AM 22 11:08 AM 21 10:09 AM 20 10:41 AM 19  9:47 AM   PSA, Diagnostic  0.00 - 4.00 ng/mL 1.71 4.69 High  CM 3.3 R, CM 2.8 R, CM 3.1 R, CM 3.8 R, CM     Bladder cancer: Status post TURBT of recurrence of bladder cancer 2019 with mitomycin.  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  Previous TURBT was 2018 with posterior wall tumor and a tumor of low malignant potential at the left ureteral orifice.  Underwent 6 weeks of  BCG after the recurrence.  Last cystoscopy was by me April 13, 2023 which was negative.  Here for yearly cystoscopy.       Cystoscopy     Date/Time  4/11/2024 11:00 AM     Performed by  Dannie Real MD   Authorized by  Dannie Real MD     Universal Protocol:  Consent: Verbal consent obtained. Written consent obtained.      Procedure Details:  Procedure type: cystoscopy    Additional Procedure Details: Cystoscopy Procedure Note        Pre-operative Diagnosis: Bladder cancer surveillance    Post-operative Diagnosis: Same, no recurrence    Procedure: Flexible cystoscopy    Surgeon: Dannie Real MD    Anesthesia: 1% Xylocaine per urethra    EBL: Minimal    Complications: none    Procedure Details   The risks, benefits, complications, treatment options, and expected outcomes were discussed with the patient. The patient concurred with the proposed plan, giving informed consent.    Cystoscopy was performed today under local anesthesia, using sterile technique. The patient was placed in the supine position, prepped with Betadine, and draped in the usual sterile fashion. The flexible cystocope was used to inspect both the urethra and bladder    Findings:  Urethra: Normal without stricture.  Prostate mostly nonocclusive.  Some BPH.  Mild signs of radiation changes to the mucosa.    Bladder:  Smooth, not trabeculated and there were no stones tumors or other lesions.  The orifices were orthotopic and intact.  No recurrence of bladder cancer.           Specimens: none                 Complications:  None           Disposition: To home            Condition:  Stable                 The following portions of the patient's history were reviewed and updated as appropriate: allergies, current medications, past family history, past medical history, past social history, past surgical history and problem list.  Past Medical History:   Diagnosis Date    Arthritis     Bladder cancer (HCC) 11/29/17    superficial    Bladder tumor     BPH  with obstruction/lower urinary tract symptoms     Cancer (HCC)     bladder    Cold     11/15 symptoms/to call Dr Real if they persist or worsen    Dental crowns present     Elevated PSA     Hematuria, microscopic     History of total left knee replacement     twice one 2011/ and revision 2017    Hyperlipidemia     Hypertension     Irregular heart beat     not on EKGs anymore    Neuropathy     in feet    Prediabetes     Prostate cancer (HCC)     Prostatitis, acute 09/18/2017    Seasonal allergies     Tooth missing     Wears glasses      Past Surgical History:   Procedure Laterality Date    CARDIAC CATHETERIZATION      approx 15 yrs ago for irreg heart beat at [facility]    COLONOSCOPY      CYSTOSCOPY      CYSTOSCOPY N/A 2/27/2019    Procedure: CYSTOSCOPY;  Surgeon: Dannie Real MD;  [facility];  Service: Urology    HERNIA REPAIR Right     inguinal    INSTILLATION MYTOMYCIN N/A 4/25/2018    Procedure: INSTILLATION MITOMYCIN;  Surgeon: Dannie Real MD;  [facility];  Service: Urology    JOINT REPLACEMENT Left     knee    KNEE ARTHROSCOPY Left 1991    ACL    LIPOMA RESECTION      IL BLADDER INSTILLATION ANTICARCINOGENIC AGENT N/A 11/29/2017    Procedure: INSTILLATION MYTOMYCIN;  Surgeon: Dannie Real MD;  [facility];  Service: Urology    IL BLADDER INSTILLATION ANTICARCINOGENIC AGENT N/A 2/27/2019    Procedure: INSTILLATION MITOMYCIN;  Surgeon: Dannie Real MD;  [facility];  Service: Urology    IL BX PROSTATE STRTCTC SATURATION SAMPLING [facility] GID N/A 9/25/2023    Procedure: TRANSPERINEAL MRI FUSION BIOPSY PROSTATE;  Surgeon: Harlan Fuentes MD;  [facility];  Service: Urology    IL CYSTO BLADDER W/URETERAL CATHETERIZATION N/A 4/25/2018    Procedure: CYSTOSCOPY WITH RETROGRADE PYELOGRAM;  Surgeon: Dannie Real MD;  [facility];  Service: Urology    IL CYSTO W/REMOVAL OF LESIONS SMALL N/A 2/27/2019    Procedure: TRANSURETHRAL RESECTION OF BLADDER TUMOR (TURBT);   Surgeon: Dannie Real MD;  [facility];  Service: Urology    TN CYSTOURETHROSCOPY N/A 11/29/2017    Procedure: CYSTOSCOPY;  Surgeon: Dannie Real MD;  [facility];  Service: Urology    TN CYSTOURETHROSCOPY W/DEST &/RMVL MED BLADDER EDWARD N/A 11/29/2017    Procedure: TRANSURETHRAL RESECTION OF BLADDER TUMOR (TURBT);  Surgeon: Dannie Real MD;  [facility];  Service: Urology    TN PLMT INTERSTITIAL DEV RADIAT TX PROSTATE 1/MULT N/A 12/13/2023    Procedure: INSERTION OF FIDUCIAL MARKER, SPACEOAR;  Surgeon: Matt Crews MD;  [facility];  Service: Urology    SKIN BIOPSY      TONSILLECTOMY      TRANSURETHRAL RESECTION OF BLADDER TUMOR N/A 4/25/2018    Procedure: TRANSURETHRAL RESECTION OF BLADDER TUMOR (TURBT);  Surgeon: Dannie Real MD;  [facility];  Service: Urology     shoulder  Review of Systems   Review of Systems   Genitourinary: Negative.        Active Problem List     Patient Active Problem List   Diagnosis    Malignant neoplasm of posterior wall of urinary bladder (HCC)    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)    Bladder tumor    Tooth missing    Dental crowns present    Hypertension    Hyperlipidemia    Elevated PSA    Gross hematuria    Prediabetes    Pure hypercholesterolemia    Acute prostatitis    Chest pain    Prostate cancer (HCC)       Objective   /76   Pulse 68   Wt 80.3 kg (177 lb)   BMI 25.40 kg/m²     Physical Exam  Vitals reviewed.   Constitutional:       Appearance: Normal appearance. He is normal weight.   HENT:      Head: Normocephalic and atraumatic.   Eyes:      Extraocular Movements: Extraocular movements intact.   Pulmonary:      Effort: Pulmonary effort is normal.   Genitourinary:     Penis: Normal.    Musculoskeletal:         General: Normal range of motion.      Cervical back: Normal range of motion.   Skin:     Coloration: Skin is not jaundiced or pale.   Neurological:      General: No focal deficit present.       Mental Status: He is alert and oriented to person, place, and time. Mental status is at baseline.   Psychiatric:         Mood and Affect: Mood normal.         Behavior: Behavior normal.         Thought Content: Thought content normal.         Judgment: Judgment normal.             Current Medications     Current Outpatient Medications:     Alpha-Lipoic Acid 600 MG CAPS, Take 600 mg by mouth daily  , Disp: , Rfl:     bisoprolol-hydrochlorothiazide (ZIAC) 2.5-6.25 MG per tablet, Take 1 tablet by mouth daily, Disp: , Rfl:     cephalexin (KEFLEX) 500 mg capsule, Take 1 capsule (500 mg total) by mouth 1 (one) time for 1 dose Take after procedure, Disp: 1 capsule, Rfl: 0    Coenzyme Q10-Red Yeast Rice  MG CAPS, Take 4 capsules by mouth 2 (two) times a day 50/1200 dose, Disp: , Rfl:     Cranberry 500 MG CAPS, Take 1 capsule by mouth in the morning, Disp: , Rfl:     Multiple Vitamins-Minerals (PreserVision AREDS 2) CAPS, Take 1 capsule by mouth in the morning Vision supplement, Disp: , Rfl:     Omega-3 Fatty Acids (FISH OIL OMEGA-3 PO), Take 1 tablet by mouth in the morning, Disp: , Rfl:     Probiotic Product (PROBIOTIC PO), Take 1 capsule by mouth daily, Disp: , Rfl:     Saw Palmetto 160 MG CAPS, Take 320 mg by mouth in the morning, Disp: , Rfl:     tamsulosin (FLOMAX) 0.4 mg, Take 0.4 mg by mouth daily with dinner  , Disp: , Rfl:     amoxicillin (AMOXIL) 500 MG tablet, Take 500 mg by mouth daily as needed (one hour prior to dental appt), Disp: , Rfl:     sildenafil (VIAGRA) 50 MG tablet, 50 mg daily as needed, Disp: , Rfl: 3        Dannie Real MD

## 2024-04-11 NOTE — LETTER
2024     Slade Casillas MD  [address] [address]    Patient: Ed Barnes   YOB: 1950   Date of Visit: 2024       Dear Dr. Casillas:    Thank you for referring Ed Barnes to me for evaluation. Below are my notes for this consultation.    If you have questions, please do not hesitate to call me. I look forward to following your patient along with you.         Sincerely,        Dannie Real MD        CC: No Recipients    Dannie Real MD  2024 12:04 PM  Sign when Signing Visit  UROLOGY PROGRESS NOTE   [facility]  [address] [place] [address] [place]  [address]  [place] [address]  [phone]  Fax:[phone]  [email or web address]      NAME: Ed Barnes  AGE: 73 y.o. SEX: male  : 1950   MRN: [id number]    DATE: 2024  TIME: 12:03 PM    Assessment and Plan:    Prostate cancer status post IMRT completed 2024-PSA down to 1.71.  Follow-up in 6 months with another PSA.    Bladder cancer surveillance: No recurrence for 5 years, can stop surveillance.  We will check his urinalysis yearly.  If he develops new hematuria or gross hematuria we will repeat scope.               Chief Complaint   No chief complaint on file.      History of Present Illness   Prostate cancer: Underwent transperineal prostate biopsy for elevated PSA 2023 by Dr. Fuentes.  He has Parshall 3 posterior equal 6 prostate cancer the right anterior medial, [place]arshall 3+4 equal 7 left anterior medial, [place] 3+4 equal 7 anterior region of interest by MRI.  I have already called him with results.  He is most interested in radiation.  Prebiopsy PSA was 4.69 multiparametric MRI showed 53 cc gland with a category 5 lesion mid anterior prostate and midline involving the anterior fibromuscular stroma and anterior transition zone.  No evidence of any spread.  Completed IMRT 2024.     Component  Ref Range & Units 24 12:14 PM 23 11:05 AM  7/5/22 11:08 AM 9/20/21 10:09 AM 8/28/20 10:41 AM 8/9/19  9:47 AM   PSA, Diagnostic  0.00 - 4.00 ng/mL 1.71 4.69 High  CM 3.3 R, CM 2.8 R, CM 3.1 R, CM 3.8 R, CM     Bladder cancer: Status post TURBT of recurrence of bladder cancer February 22, 2019 with mitomycin.  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  Previous TURBT was April 25, 2018 with posterior wall tumor and a tumor of low malignant potential at the left ureteral orifice.  Underwent 6 weeks of BCG after the recurrence.  Last cystoscopy was by me April 13, 2023 which was negative.  Here for yearly cystoscopy.       Cystoscopy     Date/Time  4/11/2024 11:00 AM     Performed by  Dannie Real MD   Authorized by  Dannie Real MD     Universal Protocol:  Consent: Verbal consent obtained. Written consent obtained.      Procedure Details:  Procedure type: cystoscopy    Additional Procedure Details: Cystoscopy Procedure Note        Pre-operative Diagnosis: Bladder cancer surveillance    Post-operative Diagnosis: Same, no recurrence    Procedure: Flexible cystoscopy    Surgeon: Dannie Real MD    Anesthesia: 1% Xylocaine per urethra    EBL: Minimal    Complications: none    Procedure Details   The risks, benefits, complications, treatment options, and expected outcomes were discussed with the patient. The patient concurred with the proposed plan, giving informed consent.    Cystoscopy was performed today under local anesthesia, using sterile technique. The patient was placed in the supine position, prepped with Betadine, and draped in the usual sterile fashion. The flexible cystocope was used to inspect both the urethra and bladder    Findings:  Urethra: Normal without stricture.  Prostate mostly nonocclusive.  Some BPH.  Mild signs of radiation changes to the mucosa.    Bladder:  Smooth, not trabeculated and there were no stones tumors or other lesions.  The orifices were  orthotopic and intact.  No recurrence of bladder cancer.           Specimens: none                 Complications:  None           Disposition: To home            Condition:  Stable                 The following portions of the patient's history were reviewed and updated as appropriate: allergies, current medications, past family history, past medical history, past social history, past surgical history and problem list.  Past Medical History:   Diagnosis Date   • Arthritis    • Bladder cancer (HCC) 11/29/17    superficial   • Bladder tumor    • BPH with obstruction/lower urinary tract symptoms    • Cancer (HCC)     bladder   • Cold     11/15 symptoms/to call Dr Real if they persist or worsen   • Dental crowns present    • Elevated PSA    • Hematuria, microscopic    • History of total left knee replacement     twice one 2011/ and revision 2017   • Hyperlipidemia    • Hypertension    • Irregular heart beat     not on EKGs anymore   • Neuropathy     in feet   • Prediabetes    • Prostate cancer (HCC)    • Prostatitis, acute 09/18/2017   • Seasonal allergies    • Tooth missing    • Wears glasses      Past Surgical History:   Procedure Laterality Date   • CARDIAC CATHETERIZATION      approx 15 yrs ago for irreg heart beat at [facility]   • COLONOSCOPY     • CYSTOSCOPY     • CYSTOSCOPY N/A 2/27/2019    Procedure: CYSTOSCOPY;  Surgeon: Dannie Real MD;  [facility];  Service: Urology   • HERNIA REPAIR Right     inguinal   • INSTILLATION MYTOMYCIN N/A 4/25/2018    Procedure: INSTILLATION MITOMYCIN;  Surgeon: Dannie Real MD;  [facility];  Service: Urology   • JOINT REPLACEMENT Left     knee   • KNEE ARTHROSCOPY Left 1991    ACL   • LIPOMA RESECTION     • [place] BLADDER INSTILLATION ANTICARCINOGENIC AGENT N/A 11/29/2017    Procedure: INSTILLATION MYTOMYCIN;  Surgeon: Dannie Real MD;  [facility];  Service: Urology   • AZ BLADDER INSTILLATION ANTICARCINOGENIC AGENT N/A 2/27/2019    Procedure: INSTILLATION  MITOMYCIN;  Surgeon: Dannie Real MD;  [facility];  Service: Urology   • MT BX PROSTATE STRTCTC SATURATION SAMPLING IMG GID N/A 9/25/2023    Procedure: TRANSPERINEAL MRI FUSION BIOPSY PROSTATE;  Surgeon: Harlan Fuentes MD;  [facility];  Service: Urology   • MT CYSTO BLADDER W/URETERAL CATHETERIZATION N/A 4/25/2018    Procedure: CYSTOSCOPY WITH RETROGRADE PYELOGRAM;  Surgeon: Dannie Real MD;  [facility];  Service: Urology   • MT CYSTO W/REMOVAL OF LESIONS SMALL N/A 2/27/2019    Procedure: TRANSURETHRAL RESECTION OF BLADDER TUMOR (TURBT);  Surgeon: Dannie Real MD;  [facility];  Service: Urology   • MT CYSTOURETHROSCOPY N/A 11/29/2017    Procedure: CYSTOSCOPY;  Surgeon: Dannie Real MD;  [facility];  Service: Urology   • MT CYSTOURETHROSCOPY W/DEST &/RMVL MED BLADDER EDWARD N/A 11/29/2017    Procedure: TRANSURETHRAL RESECTION OF BLADDER TUMOR (TURBT);  Surgeon: Dannie Real MD;  [facility];  Service: Urology   • MT PLMT INTERSTITIAL DEV RADIAT TX PROSTATE 1/MULT N/A 12/13/2023    Procedure: INSERTION OF FIDUCIAL MARKER, SPACEOAR;  Surgeon: Matt Crews MD;  [facility];  Service: Urology   • SKIN BIOPSY     • TONSILLECTOMY     • TRANSURETHRAL RESECTION OF BLADDER TUMOR N/A 4/25/2018    Procedure: TRANSURETHRAL RESECTION OF BLADDER TUMOR (TURBT);  Surgeon: Dannie Real MD;  [facility];  Service: Urology     shoulder  Review of Systems   Review of Systems   Genitourinary: Negative.        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   Constitutional:       Appearance: Normal appearance. He is normal weight.   HENT:      Head: Normocephalic and atraumatic.   Eyes:      Extraocular Movements: Extraocular movements intact.   Pulmonary:      Effort: Pulmonary effort is normal.   Genitourinary:     Penis: Normal.    Musculoskeletal:         General: Normal range of motion.      Cervical back: Normal range of motion.   Skin:     Coloration: Skin is not jaundiced or pale.   Neurological:      General: No focal deficit present.      Mental Status: He is alert and oriented to person, place, and time. Mental status is at baseline.   Psychiatric:         Mood and Affect: Mood normal.         Behavior: Behavior normal.         Thought Content: Thought content normal.         Judgment: Judgment normal.             Current Medications     Current Outpatient Medications:   •  Alpha-Lipoic Acid 600 MG CAPS, Take 600 mg by mouth daily  , Disp: , Rfl:   •  bisoprolol-hydrochlorothiazide (ZIAC) 2.5-6.25 MG per tablet, Take 1 tablet by mouth daily, Disp: , Rfl:   •  cephalexin (KEFLEX) 500 mg capsule, Take 1 capsule (500 mg total) by mouth 1 (one) time for 1 dose Take after procedure, Disp: 1 capsule, Rfl: 0  •  Coenzyme Q10-Red Yeast Rice  MG CAPS, Take 4 capsules by mouth 2 (two) times a day 50/1200 dose, Disp: , Rfl:   •  Cranberry 500 MG CAPS, Take 1 capsule by mouth in the morning, Disp: , Rfl:   •  Multiple Vitamins-Minerals (PreserVision AREDS 2) CAPS, Take 1 capsule by mouth in the morning Vision supplement, Disp: , Rfl:   •  Omega-3 Fatty Acids (FISH OIL OMEGA-3 PO), Take 1 tablet by mouth in the morning, Disp: , Rfl:   •  Probiotic Product (PROBIOTIC PO), Take 1 capsule by mouth daily, Disp: , Rfl:   •  Saw Palmetto 160 MG CAPS, Take 320 mg by mouth in the morning, Disp: , Rfl:   •  tamsulosin (FLOMAX) 0.4 mg, Take 0.4 mg by mouth daily with dinner  ,  Disp: , Rfl:   •  amoxicillin (AMOXIL) 500 MG tablet, Take 500 mg by mouth daily as needed (one hour prior to dental appt), Disp: , Rfl:   •  sildenafil (VIAGRA) 50 MG tablet, 50 mg daily as needed, Disp: , Rfl: 3        Dannie Real MD

## 2024-04-12 ENCOUNTER — TELEMEDICINE (OUTPATIENT)
Dept: RADIATION ONCOLOGY | Facility: CLINIC | Age: 74
End: 2024-04-12
Attending: RADIOLOGY

## 2024-04-12 DIAGNOSIS — C61 PROSTATE CANCER (HCC): Primary | ICD-10-CM

## 2024-04-12 NOTE — PROGRESS NOTES
Virtual Brief Visit    This Visit is being completed by telephone. The Patient is located at Home and in the following state in which I hold an active license [place]    The patient was identified by name and date of birth. Ed Barnes was informed that this is a telemedicine visit and that the visit is being conducted through Telephone.  My office door was closed. No one else was in the room.  He acknowledged consent and understanding of privacy and security of the video platform. The patient has agreed to participate and understands they can discontinue the visit at any time.    Patient is aware this is a billable service.       Assessment/Plan:    Problem List Items Addressed This Visit          Genitourinary    Prostate cancer (HCC) - Primary     Ed Barnes is a 73 y.o. year old male with a clinical stage IIB, T1c, N0, M0 Walling score 3+4 = 7 prostate adenocarcinoma diagnosed after an elevated PSA of 4.69 NG/mL on June 5, 2023.  He had MRI of the prostate July 25, 2023 that was category 5 with highly significant cancer likely to be present in the mid anterior prostate and midline.  There was no evidence of extraprostatic tumor with no seminal vesicle invasion, no pelvic lymphadenopathy, and no pelvic osseous metastatic disease.  The calculated prostate volume was 53 cc.  He saw Dr. Real and had his first prostate biopsies on September 25, 2023.  He had biopsy cores positive bilaterally with Tracy score 3+4 = 7 and 3+3 = 6 disease including the region of interest.  He had no significant urinary obstructive complaints.  He does have a prior history of a low-grade papillary urothelial carcinoma status post TURBT followed by 6 weeks of BCG twice, once in 2018 and again in February 2019 with no recurrence of his bladder carcinoma on cystoscopy on April 13, 2023.  He just had another cystoscopy yesterday which was negative now 5 years after treatment.  We recommended definitive external beam radiation therapy  using intensity modulated radiation therapy along with daily image guided radiation therapy with the placement of fiducial markers.  He also had placement  SpaceOAR to reduce dose to the rectum.  He completed definitive radiation therapy using a hypofractionated course of treatment in the [facility] over 5.5 weeks/28 fractions to a total dose of 7000 cGy on 2024.    He returns today for follow-up examination is feeling well.  He has recovered well from radiation therapy.  He only has nocturia x 1 one or two nights per week.  His urinary urgency has resolved.  He is having no difficulty with diarrhea.  He is also having no gas.  He is taking Gas-X.  His repeat PSA level from 2024 came down to 1.7 NG/mL.  We are pleased with his good response to treatment.  PSA results were discussed with him.  He saw Dr. Real yesterday and had a negative cystoscopy after treatment for bladder cancer 5 years ago.  He does not require any additional radiation therapy.  He will be following with Dr. Real every 6 months and having PSA levels performed.  He will be seen here on a as needed basis.       Recent Visits  No visits were found meeting these conditions.  Showing recent visits within past 7 days and meeting all other requirements  Future Appointments  No visits were found meeting these conditions.  Showing future appointments within next 150 days and meeting all other requirements     Oncology History Overview Note   With h/o Stage IIB [place] 7 (3+4) prostate cancer.  Completed RT on 2/15/24.  Today's visit is an EOT telemedicine.    [id number]  Urology     Prostate cancer ([facility])   2023 Initial Diagnosis    Prostate cancer ([facility])     10/11/2023 -  Cancer Staged    Staging form: Prostate, AJCC 8th Edition  - Clinical stage from 10/11/2023: Stage IIB (cT1c, cN0, cM0, PSA: 4.7, Grade Group: 2) - Signed by Curry Hager MD on 10/11/2023  Histopathologic type:  Adenocarcinoma, NOS  Stage prefix: Initial diagnosis  Prostate specific antigen (PSA) range: Less than 10  Tracy primary pattern: 3  Tracy secondary pattern: 4  [place] score: 7  Histologic grading system: 5 grade system  Location of positive needle core biopsies: Both sides       1/4/2024 - 2/15/2024 Radiation      Plan ID Energy Fractions Dose per Fraction (cGy) Dose Correction (cGy) Total Dose Delivered (cGy) Elapsed Days   Hypo Prostate 6X-FFF 28 / 28 250 0 7,000 42      Treatment dates:  C1: 1/4/2024 - 2/15/2024           Visit Time  Total Visit Duration: 10 minutes

## 2024-08-01 NOTE — PATIENT INSTRUCTIONS
See 07/11/24 TE encounter.    Your laceration was repaired today  Leave dressing in place x 24 hours  Keep wound clean and dry  Wash gently with soap and water as needed, do not submerge or soak as this will delay healing and increase risk of infection  Take antibiotics as directed until complete  Use over the counter ibuprofen and/or tylenol as directed on the bottle as needed for pain  Skin Adhesive Care   WHAT YOU NEED TO KNOW:   Skin adhesive is medical glue used to close wounds  It is a substitute for staples and stitches  Skin adhesive wound closures take less time and do not require anesthesia  You have less pain and a lower risk of infection than with staples or stitches  Skin adhesive will fall off after the wound is healed  DISCHARGE INSTRUCTIONS:   Self-care:   · Keep your wound clean and dry  for 1 to 5 days  You can shower 24 hours after the skin adhesive is applied  Lightly pat your wound dry after you shower  · Do not soak  your wound in water, such as in a bath or hot tub  · Do not scrub  your wound or pick at the adhesive  This can make your wound reopen  · Do not apply ointments  to your wound  These include antibiotic and other ointments that contain petroleum jelly  These products will remove skin adhesive and reopen your wound  Follow up with your doctor as directed:  Write down your questions so you remember to ask them during your visits  Contact your healthcare provider if:   · You have a fever  · Your wound is red and warm to touch  · You have questions or concerns about your condition or care  Seek care immediately if:   · Your wound has fluid draining from it  · Your wound opens  © Copyright [employer] 2022 Information is for End User's use only and may not be sold, redistributed or otherwise used for commercial purposes   All illustrations and images included in CareNotes® are the copyrighted property of A D A M , Inc  or [employer]  The above information is an  only  It is not intended as medical advice for individual conditions or treatments  Talk to your doctor, nurse or pharmacist before following any medical regimen to see if it is safe and effective for you

## 2024-09-25 ENCOUNTER — APPOINTMENT (OUTPATIENT)
Dept: LAB | Age: 74
End: 2024-09-25
Payer: MEDICARE

## 2024-09-25 DIAGNOSIS — C61 PROSTATE CANCER (HCC): ICD-10-CM

## 2024-09-25 LAB — PSA SERPL-MCNC: 0.74 NG/ML (ref 0–4)

## 2024-09-25 PROCEDURE — 36415 COLL VENOUS BLD VENIPUNCTURE: CPT

## 2024-09-25 PROCEDURE — 84153 ASSAY OF PSA TOTAL: CPT

## 2024-10-02 NOTE — PROGRESS NOTES
UROLOGY PROGRESS NOTE   [facility]  [address]  [address]  [place] [address]  [phone]  Fax:[phone]  [email or web address]      NAME: Ed Barnes  AGE: 74 y.o. SEX: male  : 1950   MRN: [id number]    DATE: 10/3/2024  TIME: 10:30 AM    Assessment and Plan:    Prostate cancer status post IMRT completed 2024 as below-doing well, PSA dropping as expected, follow-up in 6 months with another PSA.    History of bladder cancer: Urinalysis with microscopy done just before next visit.                   Chief Complaint     Chief Complaint   Patient presents with    Follow-up       History of Present Illness   Prostate cancer: Underwent transperineal prostate biopsy for elevated PSA 2023 by Dr. Fuentes.  He has Tracy 3 posterior equal 6 prostate cancer the right anterior medial, Tracy 3+4 equal 7 left anterior medial, [place] 3+4 equal 7 anterior region of interest by MRI.     Prebiopsy PSA was 4.69 multiparametric MRI showed 53 cc gland with a category 5 lesion mid anterior prostate and midline involving the anterior fibromuscular stroma and anterior transition zone.  No evidence of any spread.  Completed IMRT 2024. No history of ADT.  Component  Ref Range & Units 24  8:19 AM 24 12:14 PM 23 11:05 AM 22 11:08 AM 21 10:09 AM 20 10:41 AM 19  9:47 AM   PSA, Diagnostic  0.000 - 4.000 ng/mL 0.736 1.71 R, CM 4.69 High  R, CM 3.3 R, CM 2.8 R, CM 3.1 R, CM 3.8      Bladder cancer: Status post TURBT of recurrence of bladder cancer 2019 with mitomycin.  Pathology at that time 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 and that was uninvolved.  Previous TURBT was able 2018 with posterior wall tumor and tumor of low malignant potential at the left ureteral orifice.  He underwent 6 weeks of BCG after the recurrence.  Last cystoscopy was - 2024, he was  therefore recurrence free for 5 years so we stopped surveillance.  The following portions of the patient's history were reviewed and updated as appropriate: allergies, current medications, past family history, past medical history, past social history, past surgical history and problem list.  Past Medical History:   Diagnosis Date    Arthritis     Bladder cancer (HCC) 11/29/17    superficial    Bladder tumor     BPH with obstruction/lower urinary tract symptoms     Cancer (HCC)     bladder    Cold     11/15 symptoms/to call Dr Real if they persist or worsen    Dental crowns present     Elevated PSA     Hematuria, microscopic     History of total left knee replacement     twice one 2011/ and revision 2017    Hyperlipidemia     Hypertension     Irregular heart beat     not on EKGs anymore    Neuropathy     in feet    Prediabetes     Prostate cancer (HCC)     Prostatitis, acute 09/18/2017    Seasonal allergies     Tooth missing     Wears glasses      Past Surgical History:   Procedure Laterality Date    CARDIAC CATHETERIZATION      approx 15 yrs ago for irreg heart beat at [facility]    COLONOSCOPY      CYSTOSCOPY      CYSTOSCOPY N/A 2/27/2019    Procedure: CYSTOSCOPY;  Surgeon: Dannie Real MD;  [facility];  Service: Urology    HERNIA REPAIR Right     inguinal    INSTILLATION MYTOMYCIN N/A 4/25/2018    Procedure: INSTILLATION MITOMYCIN;  Surgeon: Dannie Real MD;  [facility];  Service: Urology    JOINT REPLACEMENT Left     knee    KNEE ARTHROSCOPY Left 1991    ACL    LIPOMA RESECTION      AL BLADDER INSTILLATION ANTICARCINOGENIC AGENT N/A 11/29/2017    Procedure: INSTILLATION MYTOMYCIN;  Surgeon: Dannie Real MD;  [facility];  Service: Urology    AL BLADDER INSTILLATION ANTICARCINOGENIC AGENT N/A 2/27/2019    Procedure: INSTILLATION MITOMYCIN;  Surgeon: Dannie Real MD;  [facility];  Service: Urology    AL BX PROSTATE STRTCTC SATURATION SAMPLING IMG GID N/A 9/25/2023    Procedure:  "TRANSPERINEAL MRI FUSION BIOPSY PROSTATE;  Surgeon: Harlan Fuentes MD;  [facility];  Service: Urology    KY CYSTO BLADDER W/URETERAL CATHETERIZATION N/A 4/25/2018    Procedure: CYSTOSCOPY WITH RETROGRADE PYELOGRAM;  Surgeon: Dannie Real MD;  [facility];  Service: Urology    KY CYSTO W/REMOVAL OF LESIONS SMALL N/A 2/27/2019    Procedure: TRANSURETHRAL RESECTION OF BLADDER TUMOR (TURBT);  Surgeon: Dannie Real MD;  [facility];  Service: Urology    KY CYSTOURETHROSCOPY N/A 11/29/2017    Procedure: CYSTOSCOPY;  Surgeon: Dannie Real MD;  [facility];  Service: Urology    KY CYSTOURETHROSCOPY W/DEST &/RMVL MED BLADDER EDWARD N/A 11/29/2017    Procedure: TRANSURETHRAL RESECTION OF BLADDER TUMOR (TURBT);  Surgeon: Dannie Real MD;  [facility];  Service: Urology    KY PLMT INTERSTITIAL DEV RADIAT TX PROSTATE 1/MULT N/A 12/13/2023    Procedure: INSERTION OF FIDUCIAL MARKER, SPACEOAR;  Surgeon: Matt Crews MD;  [facility];  Service: Urology    SKIN BIOPSY      TONSILLECTOMY      TRANSURETHRAL RESECTION OF BLADDER TUMOR N/A 4/25/2018    Procedure: TRANSURETHRAL RESECTION OF BLADDER TUMOR (TURBT);  Surgeon: Dannie Real MD;  [facility];  Service: Urology     shoulder  Review of Systems   Review of Systems   Gastrointestinal: Negative.    Genitourinary: Negative.        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   Constitutional:       Appearance: Normal appearance. He is normal weight.   HENT:      Head: Normocephalic and atraumatic.   Eyes:      Extraocular Movements: Extraocular movements intact.   Pulmonary:      Effort: Pulmonary effort is normal.   Musculoskeletal:         General: Normal range of motion.      Cervical back: Normal range of motion.   Skin:     General: Skin is warm and dry.      Coloration: Skin is not jaundiced or pale.   Neurological:      General: No focal deficit present.      Mental Status: He is alert and oriented to person, place, and time. Mental status is at baseline.   Psychiatric:         Mood and Affect: Mood normal.         Behavior: Behavior normal.         Thought Content: Thought content normal.         Judgment: Judgment normal.             Current Medications     Current Outpatient Medications:     Alpha-Lipoic Acid 600 MG CAPS, Take 600 mg by mouth daily  , Disp: , Rfl:     BISOPROLOL FUMARATE PO, Take 2.5 mg by mouth, Disp: , Rfl:     Coenzyme Q10-Red Yeast Rice  MG CAPS, Take 4 capsules by mouth 2 (two) times a day 50/1200 dose, Disp: , Rfl:     Cranberry 500 MG CAPS, Take 1 capsule by mouth in the morning, Disp: , Rfl:     Multiple Vitamins-Minerals (PreserVision AREDS 2) CAPS, Take 1 capsule by mouth in the morning Vision supplement, Disp: , Rfl:     Omega-3 Fatty Acids (FISH OIL OMEGA-3 PO), Take 1 tablet by mouth in the morning, Disp: , Rfl:     Probiotic Product (PROBIOTIC PO), Take 1 capsule by mouth daily, Disp: , Rfl:     Saw Palmetto 160 MG CAPS, Take 320 mg by mouth in the morning, Disp: , Rfl:     tamsulosin (FLOMAX) 0.4 mg, Take 0.4 mg by mouth daily with dinner  , Disp: , Rfl:     amoxicillin (AMOXIL) 500 MG tablet, Take 500 mg by mouth daily as needed (one hour prior to dental appt), Disp: , Rfl:     bisoprolol-hydrochlorothiazide (ZIAC) 2.5-6.25 MG per  tablet, Take 1 tablet by mouth daily, Disp: , Rfl:     sildenafil (VIAGRA) 50 MG tablet, 50 mg daily as needed, Disp: , Rfl: 3        Dannie Real MD

## 2024-10-03 ENCOUNTER — OFFICE VISIT (OUTPATIENT)
Dept: UROLOGY | Facility: CLINIC | Age: 74
End: 2024-10-03
Payer: MEDICARE

## 2024-10-03 VITALS
SYSTOLIC BLOOD PRESSURE: 128 MMHG | DIASTOLIC BLOOD PRESSURE: 82 MMHG | TEMPERATURE: 97.6 F | HEART RATE: 70 BPM | WEIGHT: 169.2 LBS | BODY MASS INDEX: 24.22 KG/M2 | RESPIRATION RATE: 16 BRPM | OXYGEN SATURATION: 99 % | HEIGHT: 70 IN

## 2024-10-03 DIAGNOSIS — Z85.51 HISTORY OF BLADDER CANCER: Primary | ICD-10-CM

## 2024-10-03 DIAGNOSIS — C61 PROSTATE CANCER (HCC): ICD-10-CM

## 2024-10-03 PROCEDURE — 99213 OFFICE O/P EST LOW 20 MIN: CPT | Performed by: UROLOGY

## 2025-03-03 ENCOUNTER — TELEPHONE (OUTPATIENT)
Age: 75
End: 2025-03-03

## 2025-03-03 ENCOUNTER — APPOINTMENT (OUTPATIENT)
Dept: RADIOLOGY | Age: 75
End: 2025-03-03
Payer: MEDICARE

## 2025-03-03 DIAGNOSIS — K59.00 CONSTIPATION, UNSPECIFIED CONSTIPATION TYPE: ICD-10-CM

## 2025-03-03 PROCEDURE — 74022 RADEX COMPL AQT ABD SERIES: CPT

## 2025-03-03 NOTE — TELEPHONE ENCOUNTER
Called and spoke with the patient who states his PCP wanted the patient to alert his urologist about his constipation issues.  PCP ordered XR abdomen obstruction series  and  an US.  Patient taking Miralax too.  Patient states he is feeling better.

## 2025-03-03 NOTE — TELEPHONE ENCOUNTER
Pt called and scheduled an appt for 6 months follow up. Pt stated that he has been having constipation and his PCP advised him to speak with us regarding that.     Please review

## 2025-03-06 ENCOUNTER — HOSPITAL ENCOUNTER (OUTPATIENT)
Dept: RADIOLOGY | Facility: IMAGING CENTER | Age: 75
End: 2025-03-06
Payer: MEDICARE

## 2025-03-06 DIAGNOSIS — K59.00 CONSTIPATION, UNSPECIFIED: ICD-10-CM

## 2025-03-06 PROCEDURE — 76705 ECHO EXAM OF ABDOMEN: CPT

## 2025-04-10 LAB — PSA SERPL-MCNC: 0.42 NG/ML

## 2025-04-11 LAB
CAOX CRY #/AREA URNS HPF: PRESENT /[HPF]
GLUCOSE UR QL STRIP: NEGATIVE MG/DL
HGB UR QL STRIP: NEGATIVE MG/DL
KETONES UR QL STRIP: NEGATIVE MG/DL
LEUKOCYTE ESTERASE UR QL STRIP: NEGATIVE /UL
NITRITE UR QL STRIP: NEGATIVE
PH UR: 6 [PH] (ref 4.5–8)
PROT 24H UR-MRATE: NEGATIVE MG/DL
RBC #/AREA URNS HPF: NORMAL /HPF (ref 0–2)
SL AMB POCT URINE COMMENT: NORMAL
SP GR UR: 1.01 (ref 1–1.03)
SQUAMOUS #/AREA URNS HPF: NORMAL /LPF (ref 0–5)
WBC #/AREA URNS HPF: NORMAL /HPF (ref 0–5)

## 2025-04-16 ENCOUNTER — RESULTS FOLLOW-UP (OUTPATIENT)
Dept: UROLOGY | Facility: CLINIC | Age: 75
End: 2025-04-16

## 2025-04-16 NOTE — RESULT ENCOUNTER NOTE
Please let patient know that his PSA is normal.  He has scheduled follow-up with me in May to review more details.  Urine shows small amount of microscopic blood, we will check another urine at his follow-up visit.

## 2025-04-18 NOTE — TELEPHONE ENCOUNTER
Contacted patient and made him aware of PSA and u/a results. Patient will follow up as scheduled next month.

## 2025-04-18 NOTE — TELEPHONE ENCOUNTER
----- Message from IRENE Bear sent at 4/16/2025  7:52 AM EDT -----  Please let patient know that his PSA is normal.  He has scheduled follow-up with me in May to review more details.  Urine shows small amount of microscopic blood, we will check another urine at his follow-up visit.

## 2025-05-16 ENCOUNTER — OFFICE VISIT (OUTPATIENT)
Dept: UROLOGY | Facility: CLINIC | Age: 75
End: 2025-05-16
Payer: MEDICARE

## 2025-05-16 VITALS
WEIGHT: 172 LBS | OXYGEN SATURATION: 98 % | TEMPERATURE: 98.1 F | BODY MASS INDEX: 24.62 KG/M2 | HEART RATE: 73 BPM | DIASTOLIC BLOOD PRESSURE: 70 MMHG | SYSTOLIC BLOOD PRESSURE: 120 MMHG | HEIGHT: 70 IN

## 2025-05-16 DIAGNOSIS — Z85.51 HISTORY OF BLADDER CANCER: Primary | ICD-10-CM

## 2025-05-16 DIAGNOSIS — R97.20 ELEVATED PSA: ICD-10-CM

## 2025-05-16 DIAGNOSIS — R31.29 MICROSCOPIC HEMATURIA: ICD-10-CM

## 2025-05-16 DIAGNOSIS — C61 PROSTATE CANCER (HCC): ICD-10-CM

## 2025-05-16 DIAGNOSIS — C67.1 MALIGNANT NEOPLASM OF DOME OF URINARY BLADDER (HCC): ICD-10-CM

## 2025-05-16 LAB
SL AMB  POCT GLUCOSE, UA: NORMAL
SL AMB LEUKOCYTE ESTERASE,UA: NORMAL
SL AMB POCT BILIRUBIN,UA: NORMAL
SL AMB POCT BLOOD,UA: 50
SL AMB POCT CLARITY,UA: CLEAR
SL AMB POCT COLOR,UA: YELLOW
SL AMB POCT KETONES,UA: NORMAL
SL AMB POCT NITRITE,UA: NORMAL
SL AMB POCT PH,UA: 5
SL AMB POCT SPECIFIC GRAVITY,UA: 1.02
SL AMB POCT URINE PROTEIN: NORMAL
SL AMB POCT UROBILINOGEN: NORMAL

## 2025-05-16 PROCEDURE — 81002 URINALYSIS NONAUTO W/O SCOPE: CPT

## 2025-05-16 PROCEDURE — 99213 OFFICE O/P EST LOW 20 MIN: CPT

## 2025-05-16 PROCEDURE — 88112 CYTOPATH CELL ENHANCE TECH: CPT | Performed by: PATHOLOGY

## 2025-05-16 NOTE — ASSESSMENT & PLAN NOTE
Microscopic analysis from 4/10/2025 showed 3-5 RBCs per hpf.  Repeat point-of-care urinalysis today is positive for microscopic blood.  He has history of recurrent noninvasive low-grade papillary urothelial carcinoma and completed surveillance cystoscopy last year by Dr. Real in April 2024.  Will send urine for cytology and I am recommending updated baseline renal ultrasound.  If his cytology is abnormal we may need to consider CT renal protocol.  Will determine need for repeat cystoscopy versus continued surveillance based on cytology and imaging results.  Of note patient did complete radiation therapy to the prostate last year and the microscopic blood could also be from this.

## 2025-05-16 NOTE — PROGRESS NOTES
Name: Ed Barnes      : 1950      MRN: [id number]  Encounter Provider: IRENE Sanchez  Encounter Date: 2025   Encounter department: [facility] [place]    :  Assessment & Plan  Prostate cancer (HCC)  History of [place] 7 prostate cancer status post IMRT 2024.  PSA is normal at 0.42 as of 4/10/2025  Plan to check repeat PSA in 6 months.    Orders:    POCT urine dip    PSA Total, Diagnostic; Future    Microscopic hematuria  Microscopic analysis from 4/10/2025 showed 3-5 RBCs per hpf.  Repeat point-of-care urinalysis today is positive for microscopic blood.  He has history of recurrent noninvasive low-grade papillary urothelial carcinoma and completed surveillance cystoscopy last year by Dr. Real in 2024.  Will send urine for cytology and I am recommending updated baseline renal ultrasound.  If his cytology is abnormal we may need to consider CT renal protocol.  Will determine need for repeat cystoscopy versus continued surveillance based on cytology and imaging results.  Of note patient did complete radiation therapy to the prostate last year and the microscopic blood could also be from this.    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  Repeat point-of-care urinalysis today is positive for microscopic blood.  He has history of recurrent noninvasive low-grade papillary urothelial carcinoma and completed surveillance cystoscopy last year by Dr. Real in 2024.  Will send urine for cytology and I am recommending updated baseline renal ultrasound.  If his cytology is abnormal we may need to consider CT renal protocol.  Will determine need for repeat cystoscopy versus continued surveillance based on cytology and imaging results.  Of note patient did complete radiation therapy to the prostate last year and the microscopic blood could also be from  this.             Interval HPI:    He presents today reporting doing well. He has noticed some urinary hesitancy and weak urinary stream in the morning, this is variable. No urinary frequency, nocturia on average 1 time per night. No gross hematuria, dysuria, abdominal pain, or flank pain. He was taking Flomax from his PCP in the past but it was stopped by his current PCP Dr. Casillas around November 2024, this correlates to some of his change in urinary stream. He is not overly bothered by this and is okay with monitoring.           History of Present Illness     Established patient last seen by Dr. Real in October 2024 with history of prostate cancer and bladder cancer.    Prostate cancer:   Underwent transperineal prostate biopsy for elevated PSA September 25, 2023 by Dr. Fuentes.  He has Tracy 3+3= 6 prostate cancer the right anterior medial, Tracy 3+4= 7 left anterior medial, Tracy 3+4= 7 anterior region of interest by MRI. He is most interested in radiation.  Prebiopsy PSA was 4.69 multiparametric MRI showed 53 cc gland with a category 5 lesion mid anterior prostate and midline involving the anterior fibromuscular stroma and anterior transition zone.  No evidence of any spread.  He was seen by Dr. Hager with radiation oncology and completed IMRT February 13, 2024. PSA 0.42 as of 4/10/2025.      Bladder cancer:   Status post TURBT of recurrence of bladder cancer February 22, 2019 with mitomycin.  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  Previous TURBT was April 25, 2018 with posterior wall tumor and a tumor of low malignant potential at the left ureteral orifice.  Underwent 6 weeks of BCG after the recurrence.  Last cystoscopy was by Dr. Real April 11, 2024 which was negative.       Objective   There were no vitals taken for this visit.    Review of Systems   Constitutional:  Negative for chills and fever.    HENT:  Negative for congestion and sore throat.    Respiratory:  Negative for cough and shortness of breath.    Cardiovascular:  Negative for chest pain and leg swelling.   Gastrointestinal:  Negative for abdominal pain, constipation and diarrhea.   Genitourinary:  Negative for difficulty urinating, dysuria, frequency, hematuria and urgency.   Musculoskeletal:  Negative for back pain and gait problem.   Skin:  Negative for wound.   Allergic/Immunologic: Negative for immunocompromised state.   Hematological:  Does not bruise/bleed easily.       Physical Exam  Vitals and nursing note reviewed.   Constitutional:       General: He is not in acute distress.     Appearance: He is well-developed.   HENT:      Head: Normocephalic and atraumatic.     Eyes:      Conjunctiva/sclera: Conjunctivae normal.       Cardiovascular:      Rate and Rhythm: Normal rate and regular rhythm.      Heart sounds: No murmur heard.  Pulmonary:      Effort: Pulmonary effort is normal. No respiratory distress.      Breath sounds: Normal breath sounds.   Abdominal:      Palpations: Abdomen is soft.      Tenderness: There is no abdominal tenderness.     Musculoskeletal:         General: No swelling.      Cervical back: Neck supple.     Skin:     General: Skin is warm and dry.      Capillary Refill: Capillary refill takes less than 2 seconds.     Neurological:      Mental Status: He is alert.     Psychiatric:         Mood and Affect: Mood normal.           Imaging:          Please Note:  Voice dictation software has been used to create this document. There may be inadvertent transcriptions errors.     IRENE Sanchez 05/16/25

## 2025-05-16 NOTE — ASSESSMENT & PLAN NOTE
History of [place] 7 prostate cancer status post IMRT 2/13/2024.  PSA is normal at 0.42 as of 4/10/2025  Plan to check repeat PSA in 6 months.    Orders:    POCT urine dip    PSA Total, Diagnostic; Future

## 2025-05-20 ENCOUNTER — RESULTS FOLLOW-UP (OUTPATIENT)
Dept: UROLOGY | Facility: CLINIC | Age: 75
End: 2025-05-20

## 2025-05-20 PROCEDURE — 88112 CYTOPATH CELL ENHANCE TECH: CPT | Performed by: PATHOLOGY

## 2025-05-20 NOTE — TELEPHONE ENCOUNTER
----- Message from IRENE Bear sent at 5/20/2025 11:13 AM EDT -----  Please let patient know that his urine cytology was negative.  Recommend renal ultrasound and I will call him with those results.  ----- Message -----  From: Shahla Morales MA  Sent: 5/16/2025  10:34 AM EDT  To: IRENE Sanchez

## 2025-05-20 NOTE — RESULT ENCOUNTER NOTE
Please let patient know that his urine cytology was negative.  Recommend renal ultrasound and I will call him with those results.

## 2025-05-20 NOTE — TELEPHONE ENCOUNTER
Left detailed message on normal urine cytology results and to have Renal US done. Central scheduling phone was provided for him to call and schedule.

## 2025-05-30 ENCOUNTER — HOSPITAL ENCOUNTER (OUTPATIENT)
Dept: ULTRASOUND IMAGING | Facility: HOSPITAL | Age: 75
Discharge: HOME | End: 2025-05-30
Payer: MEDICARE

## 2025-05-30 DIAGNOSIS — Z85.51 HISTORY OF BLADDER CANCER: ICD-10-CM

## 2025-05-30 DIAGNOSIS — R31.29 MICROSCOPIC HEMATURIA: ICD-10-CM

## 2025-05-30 PROCEDURE — 76775 US EXAM ABDO BACK WALL LIM: CPT

## (undated) DEVICE — UROCATCH BAG

## (undated) DEVICE — BAG URINE DRAINAGE 2000ML ANTI RFLX LF

## (undated) DEVICE — NEEDLE 18 G X 1 1/2

## (undated) DEVICE — CATH FOLEY 16FR 5ML 2WAY LUBRICATH

## (undated) DEVICE — FORMALIN PRE-FILLED 10 PCT PROSTATE BIOPSY

## (undated) DEVICE — SCD SEQUENTIAL COMPRESSION COMFORT SLEEVE MEDIUM KNEE LENGTH: Brand: KENDALL SCD

## (undated) DEVICE — PREP PAD BNS: Brand: CONVERTORS

## (undated) DEVICE — GAUZE SPONGES,16 PLY: Brand: CURITY

## (undated) DEVICE — TELFA NON-ADHERENT ABSORBENT DRESSING: Brand: TELFA

## (undated) DEVICE — GUIDEWIRE STRGHT TIP 0.035 IN  SOLO PLUS

## (undated) DEVICE — MAX-CORE® DISPOSABLE CORE BIOPSY INSTRUMENT, 18G X 25CM: Brand: MAX-CORE

## (undated) DEVICE — UTILITY MARKER,BLACK WITH LABELS: Brand: DEVON

## (undated) DEVICE — GLOVE INDICATOR PI UNDERGLOVE SZ 6.5 BLUE

## (undated) DEVICE — NEEDLE 25G X 1 1/2

## (undated) DEVICE — RAZOR STERILE

## (undated) DEVICE — 3000CC GUARDIAN II: Brand: GUARDIAN

## (undated) DEVICE — CHEMOTHERAPY CONTAINER,HINGED LID, YELLOW: Brand: SHARPSAFETY

## (undated) DEVICE — CHLORAPREP HI-LITE 26ML ORANGE

## (undated) DEVICE — TUBING SUCTION 5MM X 12 FT

## (undated) DEVICE — HEAVY DUTY TABLE COVER: Brand: CONVERTORS

## (undated) DEVICE — PACK TUR

## (undated) DEVICE — SPECIMEN CONTAINER STERILE PEEL PACK

## (undated) DEVICE — 3M™ IOBAN™ 2 ANTIMICROBIAL INCISE DRAPE 6650EZ: Brand: IOBAN™ 2

## (undated) DEVICE — GLOVE SRG BIOGEL 7

## (undated) DEVICE — LUBRICANT SURGILUBE TUBE 4 OZ  FLIP TOP

## (undated) DEVICE — SYRINGE 10ML LL

## (undated) DEVICE — SHEATH URETERAL ACCESS 10/12FR 45CM PROXIS

## (undated) DEVICE — BOWL: 16OZ PEELPOUCH 75/CS 16/PLT: Brand: MEDEGEN MEDICAL PRODUCTS, LLC

## (undated) DEVICE — BASIC SINGLE BASIN-LF: Brand: MEDLINE INDUSTRIES, INC.

## (undated) DEVICE — SYRINGE,TOOMEY,IRRIGATION,70CC,STERILE: Brand: MEDLINE

## (undated) DEVICE — GLOVE SRG BIOGEL 7.5

## (undated) DEVICE — Device: Brand: OLYMPUS

## (undated) DEVICE — SKIN MARKER DUAL TIP WITH RULER CAP, FLEXIBLE RULER AND LABELS: Brand: DEVON

## (undated) DEVICE — SYSTEM TRANSPERINEAL ACCESS PRECISIONPOINT

## (undated) DEVICE — CONNECTOR PH 6-IN-1 Y ST: Brand: CARDINAL HEALTH

## (undated) DEVICE — CATH FOLEY 18FR 5ML 2 WAY SILICONE ELASTIMER

## (undated) DEVICE — CYSTO TUBING TUR Y IRRIGATION

## (undated) DEVICE — CATH URET .038 10FR 50CM DUAL LUMEN

## (undated) DEVICE — SYRINGE 10ML LL CONTROL TOP

## (undated) DEVICE — GLOVE PI ULTRA TOUCH SZ 6

## (undated) DEVICE — BAG DECANTER

## (undated) DEVICE — IV FLUSH NSS 10ML POSIFLUSH

## (undated) DEVICE — 3M™ TRANSPORE™ WHITE SURGICAL TAPE 1534-1, 1 INCH X 10 YARD (2,5CM X 9,1M), 12 ROLLS/CARTON 10 CARTONS/CASE: Brand: 3M™ TRANSPORE™

## (undated) DEVICE — PROBE ULTRASOUND URONAV TRIPLANE

## (undated) DEVICE — ENDOSCOPIC VALVE WITH ADAPTER.: Brand: SURSEAL® II

## (undated) DEVICE — 3M™ IOBAN™ 2 ANTIMICROBIAL INCISE DRAPE 6648EZ: Brand: IOBAN™ 2

## (undated) DEVICE — CATH FOLEY 24FR 5ML 2 WAY SILICONE ELASTIMER

## (undated) DEVICE — TRANSPOSAL ULTRAFLEX DUO/QUAD ULTRA CART MANIFOLD

## (undated) DEVICE — NEEDLE SPINAL 22G X 3.5IN  QUINCKE

## (undated) DEVICE — STERILE SURGICAL LUBRICANT,  TUBE: Brand: SURGILUBE

## (undated) DEVICE — SPONGE 4 X 4 XRAY 16 PLY STRL LF RFD

## (undated) DEVICE — ULTRASOUND GEL STERILE FOIL PK

## (undated) DEVICE — REM POLYHESIVE ADULT PATIENT RETURN ELECTRODE: Brand: VALLEYLAB

## (undated) DEVICE — GLOVE SRG BIOGEL 8